# Patient Record
Sex: FEMALE | Race: WHITE | Employment: FULL TIME | URBAN - METROPOLITAN AREA
[De-identification: names, ages, dates, MRNs, and addresses within clinical notes are randomized per-mention and may not be internally consistent; named-entity substitution may affect disease eponyms.]

---

## 2017-03-28 ENCOUNTER — TELEPHONE (OUTPATIENT)
Dept: FAMILY MEDICINE | Facility: CLINIC | Age: 22
End: 2017-03-28

## 2017-03-28 DIAGNOSIS — N94.6 DYSMENORRHEA: ICD-10-CM

## 2017-03-28 RX ORDER — ACETAMINOPHEN AND CODEINE PHOSPHATE 120; 12 MG/5ML; MG/5ML
1 SOLUTION ORAL DAILY
Qty: 90 TABLET | Refills: 0 | Status: SHIPPED | OUTPATIENT
Start: 2017-03-28 | End: 2017-03-30

## 2017-03-28 NOTE — TELEPHONE ENCOUNTER
Patient requesting refill on BCP.  Patient due for appointment.  Patient scheduled with PCP on 3-30-17.  Refilled.

## 2017-03-30 ENCOUNTER — OFFICE VISIT (OUTPATIENT)
Dept: FAMILY MEDICINE | Facility: CLINIC | Age: 22
End: 2017-03-30
Payer: COMMERCIAL

## 2017-03-30 VITALS
TEMPERATURE: 99.4 F | WEIGHT: 237.6 LBS | HEART RATE: 113 BPM | BODY MASS INDEX: 39.58 KG/M2 | OXYGEN SATURATION: 99 % | DIASTOLIC BLOOD PRESSURE: 78 MMHG | SYSTOLIC BLOOD PRESSURE: 111 MMHG | HEIGHT: 65 IN

## 2017-03-30 DIAGNOSIS — E03.4 HYPOTHYROIDISM DUE TO ACQUIRED ATROPHY OF THYROID: ICD-10-CM

## 2017-03-30 DIAGNOSIS — Z00.01 ENCOUNTER FOR ROUTINE ADULT HEALTH EXAMINATION WITH ABNORMAL FINDINGS: Primary | ICD-10-CM

## 2017-03-30 DIAGNOSIS — N94.6 DYSMENORRHEA: ICD-10-CM

## 2017-03-30 DIAGNOSIS — Z11.3 SCREEN FOR STD (SEXUALLY TRANSMITTED DISEASE): ICD-10-CM

## 2017-03-30 LAB — TSH SERPL DL<=0.005 MIU/L-ACNC: 2.31 MU/L (ref 0.4–4)

## 2017-03-30 PROCEDURE — 99395 PREV VISIT EST AGE 18-39: CPT | Performed by: PHYSICIAN ASSISTANT

## 2017-03-30 PROCEDURE — 87591 N.GONORRHOEAE DNA AMP PROB: CPT | Performed by: PHYSICIAN ASSISTANT

## 2017-03-30 PROCEDURE — 87491 CHLMYD TRACH DNA AMP PROBE: CPT | Performed by: PHYSICIAN ASSISTANT

## 2017-03-30 PROCEDURE — 36415 COLL VENOUS BLD VENIPUNCTURE: CPT | Performed by: PHYSICIAN ASSISTANT

## 2017-03-30 PROCEDURE — 84443 ASSAY THYROID STIM HORMONE: CPT | Performed by: PHYSICIAN ASSISTANT

## 2017-03-30 RX ORDER — LEVOTHYROXINE SODIUM 25 UG/1
25 TABLET ORAL DAILY
Qty: 90 TABLET | Refills: 3 | Status: SHIPPED | OUTPATIENT
Start: 2017-03-30 | End: 2018-07-17

## 2017-03-30 RX ORDER — ACETAMINOPHEN AND CODEINE PHOSPHATE 120; 12 MG/5ML; MG/5ML
1 SOLUTION ORAL DAILY
Qty: 90 TABLET | Refills: 3 | Status: SHIPPED | OUTPATIENT
Start: 2017-03-30 | End: 2019-09-03

## 2017-03-30 NOTE — MR AVS SNAPSHOT
After Visit Summary   3/30/2017    Cathy Galloway    MRN: [id number]           Patient Information     Date Of Birth          1995        Visit Information        Provider Department      3/30/2017 12:20 PM Blanche Rojas PA-C [facility]        Today's Diagnoses     Encounter for routine adult health examination with abnormal findings    -  1    Screen for STD (sexually transmitted disease)        Dysmenorrhea        Hypothyroidism due to acquired atrophy of thyroid          Care Instructions      Preventive Health Recommendations  Female Ages 18 to 25     Yearly exam:     See your health care provider every year in order to  o Review health changes.   o Discuss preventive care.    o Review your medicines if your doctor has prescribed any.      You should be tested each year for STDs (sexually transmitted diseases).       After age 20, talk to your provider about how often you should have cholesterol testing.      Starting at age 21, get a Pap test every three years. If you have an abnormal result, your doctor may have you test more often.      If you are at risk for diabetes, you should have a diabetes test (fasting glucose).     Shots:     Get a flu shot each year.     Get a tetanus shot every 10 years.     Consider getting the shot (vaccine) that prevents cervical cancer (Gardasil).    Nutrition:     Eat at least 5 servings of fruits and vegetables each day.    Eat whole-grain bread, whole-wheat pasta and brown rice instead of white grains and rice.    Talk to your provider about Calcium and Vitamin D.     Lifestyle    Exercise at least 150 minutes a week each week (30 minutes a day, 5 days a week). This will help you control your weight and prevent disease.    Limit alcohol to one drink per day.    No smoking.     Wear sunscreen to prevent skin cancer.    See your dentist every six months for an exam and cleaning.        Follow-ups after your visit        Who to  "contact     Normal or non-critical lab and imaging results will be communicated to you by MyChart, letter or phone within 4 business days after the clinic has received the results. If you do not hear from us within 7 days, please contact the clinic through [employer] or phone. If you have a critical or abnormal lab result, we will notify you by phone as soon as possible.  Submit refill requests through [employer] or call your pharmacy and they will forward the refill request to us. Please allow 3 business days for your refill to be completed.          If you need to speak with a  for additional information , please call: [phone]             Additional Information About Your Visit        [employer] Information     [employer] lets you send messages to your doctor, view your test results, renew your prescriptions, schedule appointments and more. To sign up, go to [email or web address] . Click on \"Log in\" on the left side of the screen, which will take you to the Welcome page. Then click on \"Sign up Now\" on the right side of the page.     You will be asked to enter the access code listed below, as well as some personal information. Please follow the directions to create your username and password.     Your access code is: [email or web address]  Expires: 2017 12:39 PM     Your access code will  in 90 days. If you need help or a new code, please call your [facility] clinic or [phone].        Care EveryWhere ID     This is your Care EveryWhere ID. This could be used by other organizations to access your [facility] medical records  [id number]        Your Vitals Were     Pulse Temperature Height Last Period Pulse Oximetry BMI (Body Mass Index)    113 99.4  F (37.4  C) (Oral) 5' 5\" (1.651 m) 2017 99% 39.54 kg/m2       Blood Pressure from Last 3 Encounters:   17 111/78   10/20/16 125/75   16 105/68    Weight from Last 3 Encounters:   17 237 lb 9.6 oz (107.8 kg) (>99 %)*   10/20/16 245 " lb 8 oz (111.4 kg) (>99 %)*   08/09/16 249 lb 12.8 oz (113.3 kg) (>99 %)*     * Growth percentiles are based on CDC 2-20 Years data.              We Performed the Following     Chlamydia trachomatis PCR     Neisseria gonorrhoeae PCR     TSH with free T4 reflex          Where to get your medicines      These medications were sent to [facility] - [address] [facility] [place]  [address] [facility] [place] [address]     Phone:  [phone]     levothyroxine 25 MCG tablet    norethindrone 0.35 MG per tablet          Primary Care Provider Office Phone # Fax #    Blanche Rojas PA-C [phone]       [facility] [address]  [place] [address]        Thank you!     Thank you for choosing [facility]  for your care. Our goal is always to provide you with excellent care. Hearing back from our patients is one way we can continue to improve our services. Please take a few minutes to complete the written survey that you may receive in the mail after your visit with us. Thank you!             Your Updated Medication List - Protect others around you: Learn how to safely use, store and throw away your medicines at www.disposemymeds.org.          This list is accurate as of: 3/30/17 12:39 PM.  Always use your most recent med list.                   Brand Name Dispense Instructions for use    ibuprofen 600 MG tablet    ADVIL/MOTRIN    120 tablet    Take 1 tablet by mouth every 6 hours as needed for pain.       IRON SUPPLEMENT PO      Reported on 3/30/2017       levothyroxine 25 MCG tablet    SYNTHROID/LEVOTHROID    90 tablet    Take 1 tablet (25 mcg) by mouth daily       norethindrone 0.35 MG per tablet    MICRONOR    90 tablet    Take 1 tablet (0.35 mg) by mouth daily

## 2017-03-30 NOTE — NURSING NOTE
"Chief Complaint   Patient presents with     Physical       Initial /78  Pulse 113  Temp 99.4  F (37.4  C) (Oral)  Ht 5' 5\" (1.651 m)  Wt 237 lb 9.6 oz (107.8 kg)  LMP 03/23/2017  SpO2 99%  BMI 39.54 kg/m2 Estimated body mass index is 39.54 kg/(m^2) as calculated from the following:    Height as of this encounter: 5' 5\" (1.651 m).    Weight as of this encounter: 237 lb 9.6 oz (107.8 kg).  Medication Reconciliation: complete   Maryanne Sandoval MA      "

## 2017-03-30 NOTE — PROGRESS NOTES
SUBJECTIVE:     CC: Cathy Galloway is an 21 year old woman who presents for preventive health visit.     Healthy Habits:    Do you get at least three servings of calcium containing foods daily (dairy, green leafy vegetables, etc.)? yes    Amount of exercise or daily activities, outside of work: None     Problems taking medications regularly No    Medication side effects: No    Have you had an eye exam in the past two years? yes    Do you see a dentist twice per year? yes    Do you have sleep apnea, excessive snoring or daytime drowsiness?no        Other concerns:  None    Patient informed that anything we discuss that is not related to preventative medicine, may be billed for; patient verbalizes understanding.    Today's PHQ-2 Score: 0    Abuse: Current or Past(Physical, Sexual or Emotional)- No  Do you feel safe in your environment - Yes    Social History   Substance Use Topics     Smoking status: Never Smoker     Smokeless tobacco: Never Used      Comment: Lives in smoke free household     Alcohol use No     The patient does not drink >3 drinks per day nor >7 drinks per week.    Recent Labs   Lab Test  07/26/11   0948   CHOL  148   HDL  48*   LDL  77   TRIG  110   CHOLHDLRATIO  3.1       Reviewed orders with patient.  Reviewed health maintenance and updated orders accordingly - Yes    Mammo Decision Support:  Mammogram not appropriate for this patient based on age.    Pertinent mammograms are reviewed under the imaging tab.  History of abnormal Pap smear: NO - under age 21, PAP not appropriate for age    Reviewed and updated as needed this visit by clinical staff  Tobacco  Allergies  Meds         Reviewed and updated as needed this visit by Provider        Past Medical History:   Diagnosis Date     Aortic valve disorder      Myopia      Obesity         ROS:  Other than what is noted in the HPI and PMH a complete review of systems is otherwise negative including: Constitutional, HEENT, endocrine,  "cardiovascular, respiratory, GI/, musculoskeletal, neuro, and psychiatric.     Problem list, Medication list, Allergies, and Medical/Social/Surgical histories reviewed in [facility] and updated as appropriate.  Labs reviewed in EPIC  OBJECTIVE:     /78  Pulse 113  Temp 99.4  F (37.4  C) (Oral)  Ht 5' 5\" (1.651 m)  Wt 237 lb 9.6 oz (107.8 kg)  LMP 03/23/2017  SpO2 99%  BMI 39.54 kg/m2  EXAM:  GENERAL: healthy, alert and no distress  EYES: Eyes grossly normal to inspection, PERRL and conjunctivae and sclerae normal  HENT: ear canals and TM's normal, nose and mouth without ulcers or lesions  NECK: no adenopathy, no asymmetry, masses, or scars and thyroid normal to palpation  RESP: lungs clear to auscultation - no rales, rhonchi or wheezes  CV: regular rate and rhythm, normal S1 S2, no S3 or S4, no murmur, click or rub, no peripheral edema and peripheral pulses strong  ABDOMEN: soft, nontender, no hepatosplenomegaly, no masses and bowel sounds normal  MS: no gross musculoskeletal defects noted, no edema  SKIN: no suspicious lesions or rashes  NEURO: Normal strength and tone, mentation intact and speech normal  PSYCH: mentation appears normal, affect normal/bright    ASSESSMENT/PLAN:         ICD-10-CM    1. Encounter for routine adult health examination with abnormal findings Z00.01    2. Screen for STD (sexually transmitted disease) Z11.3 Neisseria gonorrhoeae PCR     Chlamydia trachomatis PCR   3. Dysmenorrhea N94.6 norethindrone (MICRONOR) 0.35 MG per tablet   4. Hypothyroidism due to acquired atrophy of thyroid E03.4 levothyroxine (SYNTHROID/LEVOTHROID) 25 MCG tablet     TSH with free T4 reflex       Labs/screening today.   Meds renewed - no changes.   Follow up annually.      COUNSELING:   Reviewed preventive health counseling, as reflected in patient instructions       reports that she has never smoked. She has never used smokeless tobacco.    Estimated body mass index is 39.54 kg/(m^2) as calculated from " "the following:    Height as of this encounter: 5' 5\" (1.651 m).    Weight as of this encounter: 237 lb 9.6 oz (107.8 kg).       Counseling Resources:  ATP IV Guidelines  Pooled Cohorts Equation Calculator  Breast Cancer Risk Calculator  FRAX Risk Assessment  ICSI Preventive Guidelines  Dietary Guidelines for Americans, 2010  USDA's MyPlate  ASA Prophylaxis  Lung CA Screening    Blanche Rojas PA-C  [facility]  "

## 2017-03-30 NOTE — LETTER
[facility]  [address]  [place] [address]  [phone]        March 31, 2017      Cathy Galloway  [address] [address] [address]        Dear Cathy,        Your STD screen is negative and your thyroid level is in goal range.     Results for orders placed or performed in visit on 03/30/17   TSH with free T4 reflex   Result Value Ref Range    TSH 2.31 0.40 - 4.00 mU/L   Neisseria gonorrhoeae PCR   Result Value Ref Range    Specimen Descrip       Urine  CORRECTED ON 03/31 AT 0655: PREVIOUSLY REPORTED AS Urethral      N Gonorrhea PCR  NEG     Negative   Negative for N. gonorrhoeae rRNA by transcription mediated amplification.   A negative result by transcription mediated amplification does not preclude the   presence of N. gonorrhoeae infection because results are dependent on proper   and adequate collection, absence of inhibitors, and sufficient rRNA to be   detected.     Chlamydia trachomatis PCR   Result Value Ref Range    Specimen Description       Urine  CORRECTED ON 03/31 AT 0655: PREVIOUSLY REPORTED AS Urethral      Chlamydia Trachomatis PCR  NEG     Negative   Negative for C. trachomatis rRNA by transcription mediated amplification.   A negative result by transcription mediated amplification does not preclude the   presence of C. trachomatis infection because results are dependent on proper   and adequate collection, absence of inhibitors, and sufficient rRNA to be   detected.       If you have any questions or concerns, please call myself or my nurse at [phone].    Sincerely,      Blanche Rojas PA-C/rajesh

## 2017-03-31 LAB
C TRACH DNA SPEC QL NAA+PROBE: NORMAL
N GONORRHOEA DNA SPEC QL NAA+PROBE: NORMAL
SPECIMEN SOURCE: NORMAL
SPECIMEN SOURCE: NORMAL

## 2017-03-31 NOTE — PROGRESS NOTES
Please send the following letter to the patient:    Cathy,    Your STD screen is negative and your thyroid level is in goal range.     Please call me with any questions or concerns.          Blanche Rojas PA-C

## 2017-05-24 DIAGNOSIS — Z29.89 NEED FOR SBE (SUBACUTE BACTERIAL ENDOCARDITIS) PROPHYLAXIS: ICD-10-CM

## 2017-05-24 RX ORDER — AMOXICILLIN 500 MG/1
2000 CAPSULE ORAL ONCE
Qty: 4 CAPSULE | Refills: 0 | Status: CANCELLED | OUTPATIENT
Start: 2017-05-24 | End: 2017-05-24

## 2017-06-08 ENCOUNTER — OFFICE VISIT (OUTPATIENT)
Dept: FAMILY MEDICINE | Facility: CLINIC | Age: 22
End: 2017-06-08
Payer: COMMERCIAL

## 2017-06-08 VITALS
SYSTOLIC BLOOD PRESSURE: 130 MMHG | HEART RATE: 106 BPM | WEIGHT: 235 LBS | BODY MASS INDEX: 39.11 KG/M2 | DIASTOLIC BLOOD PRESSURE: 81 MMHG

## 2017-06-08 DIAGNOSIS — M26.609 TMJ (TEMPOROMANDIBULAR JOINT SYNDROME): Primary | ICD-10-CM

## 2017-06-08 PROCEDURE — 99213 OFFICE O/P EST LOW 20 MIN: CPT | Performed by: PHYSICIAN ASSISTANT

## 2017-06-08 RX ORDER — PREDNISONE 10 MG/1
TABLET ORAL
Qty: 42 TABLET | Refills: 0 | Status: SHIPPED | OUTPATIENT
Start: 2017-06-08 | End: 2017-08-10

## 2017-06-08 RX ORDER — DIAZEPAM 5 MG
5 TABLET ORAL EVERY 12 HOURS PRN
Qty: 20 TABLET | Refills: 0 | Status: SHIPPED | OUTPATIENT
Start: 2017-06-08 | End: 2017-08-10

## 2017-06-08 NOTE — PATIENT INSTRUCTIONS
Dental Treatment for Temporomandibular Disorders (TMD)  You have been diagnosed with temporomandibular disorder (TMD).This term describes a group of problems related to the temporomandibular joint (TMJ) and nearby muscles. The TMJ is located where the upper and lower jaws meet and is part of a structural system that includes the teeth. Because the joint and teeth work together, a problem with your teeth and bite can be linked to TMD. If you grind your teeth or if you have a bad bite, your dentist may be able to help. If your teeth need to be realigned to improve your bite, you may be referred to an orthodontist.  If you grind or clench your teeth    Bruxism (teeth grinding) or clenching strains the TMJ and related muscles. If you have these habits during the day, doing self-checks can help you stop. But it s hard to control these habits when you re asleep. That s when the use of a splint can often help:    How a splint works. A splint is an appliance that fits in the mouth. It may also be called an orthotic or . There are different kinds of splints for different kinds of needs. A splint can keep the upper and lower teeth apart. This helps protect tooth surfaces from grinding. A splint can also be made to reduce strain on the area.    Wearing and caring for your splint. To make a splint, your dentist or orthodontist may take impressions of your teeth. Then a splint will be made to fit your mouth. A splint:    May be worn during the day or only at night. Be sure to ask when and how often you should wear your splint.    Should be cleaned before you put it in and after you take it out. Ask your dentist or orthodontist how to clean the splint.    Should be kept in a protective case, away from the reach of children and pets. This helps keep the splint from getting dirty or broken.  If your bite is incorrect  Malocclusion means the jaws or teeth don t fit together properly. This can result in pain and problems  with jaw function. If your jaws or teeth are out of alignment, orthodontic treatment may help. If your bad bite is due to missing or damaged teeth, you may receive restorative treatment.    Restorative treatment. A bad bite can be caused by missing or damaged teeth. A dentist can restore teeth in many ways:    A crown is made of ceramic, metal, or porcelain and metal. It is cemented in place to repair a broken or damaged tooth.    A bridge is a false tooth fused between 2 crowns.    A dental implant is an artificial tooth root. It is attached to the jawbone as a base for an artificial tooth.    Orthodontic treatment. Sometimes the upper and lower jaws are out of alignment. Or teeth are out of line, turned, crowded, or spaced too far apart. Your orthodontist can align teeth with braces and other devices. This helps provide a more comfortable bite.  If surgery is needed  Surgery is rarely needed for TMD. However, if other treatments haven t worked, you may be referred to an oral and maxillofacial surgeon. Talk to your dentist about whether surgery might be right for you.     7386-0597 [employer]. [address] [place] [address]. All rights reserved. This information is not intended as a substitute for professional medical care. Always follow your healthcare professional's instructions.        Pain Relief Methods for Temporomandibular Disorders (TMD)  You have been diagnosed with temporomandibular disorder (TMD). This term describes a group of problems related to the temporomandibular joint (TMJ)and nearby muscles. The TMJ is located where the upper and lower jaws meet. TMD can cause painful and frustrating symptoms. But your health care provider can recommend various pain relief methods as part of your treatment. These may include medicines and certain types of therapy, like massage or gentle exercise.  Using medicines    Medicines may be prescribed to treat TMD. Others may be available  over-the-counter. The medicine type and dosage will depend on the problem you have. For your safety, tell your health care provider if you are currently taking any medicines. Also mention any herbs or supplements you are using. Common medicines used to treat TMD include:    Anti-inflammatories and analgesics. These treat pain, inflammation, osteoarthritis, and rheumatoid arthritis. Anti-inflammatories reduce swelling, heat, redness, and pain. They also help restore function. Analgesics reduce pain. Nonsteroidal anti-inflammatories (NSAIDs) relieve inflammation as well as pain.    Muscle relaxants. These treat myofascial pain. This is pain that occurs in the soft tissues or muscles around the TMJ. Muscle relaxants help ease muscle tension. This reduces pressure on the TMJ from tight jaw muscles.    Antidepressants. These can be used to reduce pain or bruxism (teeth grinding). At higher dosages, these medicines are used to treat depression. Given at low dosages, antidepressants help relieve TMD symptoms. They can reduce muscle pain. They also raise the level of serotonin, a body chemical that improves sleep. This in turn can decrease bruxism during the night.  Treating painful muscles  A trigger point is a painful spot in a tight muscle. It is often painful to the touch and may refer pain to other places. Your health care provider can focus on trigger points using:    Massage, both inside and outside the mouth. This relaxes muscles and improves circulation.    Palpation, which is applying pressure to points of the jaw and face with the fingers.    Cold spray and stretching of the muscles to relax them.    An anesthetic for pain relief. This may be given as an injection by your dentist.  Treating the joint  Therapy may focus directly on the TMJ. There are different ways to treat the joint:    A self-care program helps you treat and manage symptoms on your own. Your program may include exercises. It may also include  using ice and heat to relieve pain.    Gentle manipulation reduces pain and restores range of motion. The health care provider uses his or her hands to relax muscles and ligaments around the joint.    Exercises strengthen muscles in the jaw and face.    Ultrasound uses sound waves to reduce pain and swelling. It also improves pain and swelling.  Treating inflammation  When the joint is inflamed, movement becomes difficult -- even impossible at times. Your health care provider can help. Treatment may include:    Rest and gentle exercise to increase range of motion. One common exercise is to apply pressure to the jaw and resist the movement (isometric exercise).    A gel pack or ice wrapped in a towel applied for 10 to 20 minutes repeated 3 or 4 times a day. This eases swelling and reduces pain.    Massage and gentle manipulation as described above.    2464-3290 [employer]. [address] [place] [address], [place] [address]. All rights reserved. This information is not intended as a substitute for professional medical care. Always follow your healthcare professional's instructions.        Self-Care for Temporomandibular Disorders (TMD)  You have temporomandibular disorder (TMD). This term describes a group of problems related to the temporomandibular joint (TMJ) and nearby muscles. The TMJ is located where the upper and lower jaws meet. Treatment will get your jaw back to normal function. But your care doesn t end there. Once you ve had TMD, it s important to avoid reinjury. Get in the habit of doing self-checks. This can make you aware of any symptoms that begin to return, so you can take action right away.    Doing self-checks  Make it a habit to assess your body a few times each day. Try writing yourself a reminder. Or set an alarm on your watch or computer. When doing a self-check, ask yourself:    Do I feel stressed?    Are my muscles tense?    Am I grinding or clenching my teeth?    Is my posture healthy  for my body?    Is there anything I can do to make myself more comfortable?  If you answer  yes  to any of the questions above, you need to take action. Adjusting your posture or taking a short break can help prevent or relieve TMD symptoms.  Listening to your body  Many people get used to ignoring pain. But pain is a signal that your body needs care. To maintain your TMJ health:    Avoid hard or chewy foods. Even if you feel fine, eating such foods can trigger symptoms again.    Be aware of your body. Don t ignore TMD symptoms. The nagging pain in your neck or jaw may be a sign that you need care.    Be sure to keep follow-up appointments with your health care team.  Managing stress  Stress is a key factor in TMD. Stress can cause you to clench your muscles or grind your teeth. It can also affect your sleep, reducing your body s ability to heal. Here are a few tips to manage stress:    Learn ways to relax. Try listening to music or gently stretching. Take a few slow deep breaths. Or, close your eyes and imagine a place or object that is calming.    Get plenty of rest and sleep.    Set goals you know you can attain.    Make time for people and things you enjoy.    Ask for help if you need it. Friends and family can run errands and cook meals for you.   Staying active  Activity helps the body in many ways. You stay looser and more relaxed. It also helps keep muscles and tissues conditioned. That way you can heal faster and make reinjury less likely. Here are some tips to get you started:    Talk to your health care provider before starting an exercise program.    Always warm up and stretch before each activity. This helps prevent injury.    Try walking or swimming. These activities are easy on your joints. They also benefit your heart and lungs.    Try yoga or adrian chi. These are relaxing activities known for reducing stress.    3237-2362 [employer]. [address] [place] [address], [place] [address]. All rights  reserved. This information is not intended as a substitute for professional medical care. Always follow your healthcare professional's instructions.        Understanding Temporomandibular Disorders (TMD)  Do you have pain in your face, jaw, or teeth? Do you have trouble chewing? Does your jaw make clicking or popping noises? These symptoms can be caused by temporomandibular disorders (TMD). This term describes a group of problems related to the temporomandibular joint (TMJ) and nearby muscles. Your symptoms may be painful and frustrating. But don t worry. Your health care team can help you treat TMD and prevent future problems.  What s wrong?    TMD causes many kinds of symptoms. That s part of the reason it can be hard to diagnose. You may have headaches, tooth pain, or muscle aches. Your pain may be constant. Or it may come and go without any apparent reason. TMD-related problems include:    Tight muscles    Joint inflammation    Joint damage    Teeth grinding or clenching  What can you do?  If you are having TMD symptoms, don t wait. Call your dentist or health care provider right away. You don t have to live with pain or discomfort. TMD can be treated. In fact, a key part of treatment is learning to manage your condition at home.  Which treatment is right for you?  Treatment helps rest the muscles and joint. It also helps relieve symptoms and restore function. Depending on the type of problem you have, your treatment plan may include:    Temporary diet changes.    New habits for managing stress and maintaining the health of your jaw.    Medicine to reduce pain and inflammation.    Therapy to reduce pressure on the joint and restore function.    Dental treatment to reduce pressure on the joint.  How can you avoid future problems?  Treatment can help relieve your current condition. But TMD symptoms may return over time. You can avoid future problems by maintaining the health of your jaw:    Avoid foods and habits  that make your symptoms worse.    Lower the stress level in your life.    Follow your treatment plan.    Pay attention to your body and get help if symptoms return.    2857-1308 [employer]. [address] [place] [address], [place] [address]. All rights reserved. This information is not intended as a substitute for professional medical care. Always follow your healthcare professional's instructions.        When You Have Temporomandibular Disorder (TMD)  The temporomandibular joint (TMJ) is a ball-and-socket joint located where the upper and lower jaws meet. The TMJ and its nearby muscles make up a complex, loosely connected system. Because of this, a problem in one part of the system can affect the other parts. This can cause you to have temporomandibular disorder (TMD).         How the temporomandibular joint works  You have 1 joint on each side of your mouth that together make up the TMJ. These joints are part of a large group of muscles, ligaments, and bones that work together as a system. When the system is healthy, you can talk, chew, and even yawn in comfort. Muscles contract and relax to open and close the joint. The disk is made of cartilage and is located between the condyle and the skull. It absorbs pressure in the joint and allows the jaws to open and close smoothly. Fluid (called synovial fluid) lubricates the joints. Ligaments connect the lower jaw bones to the skull. They also support the joint.  Common temporomandibular problems  When there is a problem with the TMJ and its related system, you can develop TMD. Common TMD problems include tight muscles, inflamed joints, and damaged joints.  In some cases, symptoms may be related to the teeth or bite.    Tight muscles. The muscles surrounding the TMJ can go into spasm (tighten) and cause pain. Referred pain happens in a part of the body separate from the source of the problem. For example, pain in the face or teeth could be coming from a problem in  the TMJ. Myofascial pain happens in soft tissues, like muscle. Trigger points in these pain areas often cause referred pain. You may feel jaw, neck, or shoulder pain.    Inflamed joints. Inflammation may include pain, redness, heat, swelling, or loss of function. Synovitis happens when certain tissues surrounding the TMJ become inflamed. It causes pain that increases with jaw movement. Inflamed ligamentscan be caused by strain or injury. When this happens, the ligaments are unable to support the joint. Rheumatoid arthritis is a joint disease. It leads to inflammation in the TMJ.    Damaged joints. Many people hear clicking when their jaw moves. If you feel pain along with the noise, the joint may be damaged. Impingement happens when the disk slips out of place (displacement). This causes the jaw to catch. As the disk slips, you may hear a clicking sound. Locked jaw happens when the disk gets stuck in one position. As a result, the jaw locks open or closed. Osteoarthritis is a joint disease. It causes the TMJ to wear away (degenerate). This leads to pain during movement.     Other problems  The parts of the jaw and mouth make up a single unit. That s why a problem in 1 area can cause symptoms elsewhere. Teeth or bite problems associated with TMD include:    Bruxism (grinding your teeth side to side)    Clenching (biting down on your teeth)    Malocclusion (when the teeth or bite is out of alignment)  Your health care provider will give you more information about these problems if needed.     3255-5349 [employer]. [address] [place] [address]. All rights reserved. This information is not intended as a substitute for professional medical care. Always follow your healthcare professional's instructions.        TMJ Syndrome  The temporomandibular joint (TMJ) is the joint that connects your lower jaw to your head. You can feel it in front of your ears when you open and close your mouth. TMJ  disorders involve chronic or recurrent pain in the joint. When treated, symptoms of TMJ disorders usually go away within a few months.  Causes  There is no widely agreed-on cause of TMJ disorders. They have been linked to injury, arthritis, chronic fatigue syndrome, and fibromyalgia. A definite connection has not been shown, though.  Symptoms    Pain in the face, jaw, or neck    Pain with jaw movement or chewing    Locking or catching sensation of the jaw    Clicking, popping, or grinding sounds with movement of the TMJ    Headache    Ear pain  Home care  Modest, nonsurgical treatments are a good first step toward relieving symptoms. Try the approaches described below.    Rest the jaw by avoiding crunchy or hard-to-chew foods. Do not eat hard or sticky candies. Soft foods and liquids are easier on the jaw.    Protect your jaw while yawning. If you need to yawn, put your fist under your chin to prevent your mouth from opening up too wide.    To help relieve pain, try applying hot or cold packs to the painful area. Try both hot and cold to find out which works best for you. If you use hot packs (small towels soaked in hot water), be careful not to burn yourself.    You may take acetaminophen or ibuprofen for pain, unless you were given a different pain medicine. (Note: If you have chronic liver or kidney disease or have ever had a stomach ulcer or gastrointestinal bleeding, talk with your healthcare provider before using these medicines. Also talk to your provider if you are taking medicine to prevent blood clots.) Aspirin should never be given to anyone younger than 18 years of age who is ill with a viral infection or fever. It may cause severe liver or brain damage.  Reducing stress  If stress seems to be contributing to your symptoms, try to identify the sources of stress in your life. These aren t always obvious. Common stressors include:    Everyday hassles (which can add up), such as traffic jams, missed  appointments, or car trouble    Major life changes, both good (such as a new baby or job promotion) and bad (loss of job or loss of a loved one)    Overload: The feeling that you have too many responsibilities and can't take care of everything at once    Helplessness: Feeling like your problems are more than you can solve  When possible, do something about your sources of stress. See if you can avoid hassles, limit the amount of change in your life at one time, and take breaks when you feel overloaded.  Unfortunately, many stressful situations cannot be avoided. So learning how to manage stress better is very important. Getting regular exercise, eating nutritious, balanced meals, and getting adequate rest all help to make everyday stress more manageable. Certain techniques are also helpful: relaxation and breathing exercises, visualization, biofeedback, meditation, or simply taking some time out to clear your mind. For more information, talk with your healthcare provider.  Follow-up care  Follow up with your healthcare provider, or as advised. Further testing and additional treatment may be required. If changes to your lifestyle do not improve your symptoms, talk with your healthcare provider about other available therapies. These include bite guards for help with teeth grinding, stress management techniques, and more. If stress is an important factor and does not respond to the above simple measures, talk to your doctor about a referral for stress management.    If X-rays were done, they will be reviewed by a specialist. You will be notified of the results, especially if they affect treatment.  Call 911  Call emergency services right away if any of these occur:    Trouble breathing or swallowing, wheezing    Confusion    Extreme drowsiness or trouble awakening    Fainting or loss of consciousness    Rapid heart rate  When to seek medical advice  Call your healthcare provider right away if any of these  occur:    Your face becomes swollen or red.    Your pain worsens.    You have increasing neck, mouth, tooth, or throat pain.    You develop a fever of 100.4F (38 C) or higher    3924-2732 [employer]. [address] [place] [address], [place] [address]. All rights reserved. This information is not intended as a substitute for professional medical care. Always follow your healthcare professional's instructions.

## 2017-06-08 NOTE — NURSING NOTE
"Chief Complaint   Patient presents with     Temporomandibular Joint Pain       Initial /81  Pulse 106  Wt 235 lb (106.6 kg)  BMI 39.11 kg/m2 Estimated body mass index is 39.11 kg/(m^2) as calculated from the following:    Height as of 3/30/17: 5' 5\" (1.651 m).    Weight as of this encounter: 235 lb (106.6 kg).  Medication Reconciliation: dayna Ramirez CMA      "

## 2017-06-08 NOTE — PROGRESS NOTES
SUBJECTIVE:                                                    Cathy Galloway is a 21 year old female who presents to clinic today for the following health issues:      Patient is here today discuss treatment for TMJ.       Bothers her most in the am. Some stiffness. Occasional it locks closed.     Problem list and histories reviewed & adjusted, as indicated.  Additional history: as documented    Patient Active Problem List   Diagnosis     Myopia     Obesity     Dysmenorrhea     Hypothyroidism due to acquired atrophy of thyroid     Supravalvar aortic stenosis     Past Surgical History:   Procedure Laterality Date     HEART CATH CHILD  8/20/2012    Procedure: HEART CATH CHILD;  Right and Left Heart Cath;  Surgeon: Esvin Merols MD;  Location: [facility]     RELEASE TRIGGER FINGER       REPAIR VALVE AORTIC  10/8/2012    Procedure: REPAIR VALVE AORTIC;  Release Supravalvular Aortic Stenosis, medan sternotomy, trans-esophageal echocardiaogram done by Dr. Wills;  Surgeon: Seng Lan MD;  Location: [facility]       Social History   Substance Use Topics     Smoking status: Never Smoker     Smokeless tobacco: Never Used      Comment: Lives in smoke free household     Alcohol use No     Family History   Problem Relation Age of Onset     Hypertension Father      Cardiovascular Maternal Grandmother      HEART DISEASE Maternal Grandmother      Cardiovascular Paternal Grandmother      Lipids Paternal Grandmother      HEART DISEASE Paternal Grandmother      Cardiovascular Brother      HEART DISEASE Brother      Cardiovascular Maternal Aunt      HEART DISEASE Maternal Aunt      Musculoskeletal Disorder Mother      back     CANCER No family hx of      DIABETES No family hx of      CEREBROVASCULAR DISEASE No family hx of      Thyroid Disease No family hx of      Glaucoma No family hx of      Macular Degeneration No family hx of          BP Readings from Last 3 Encounters:   06/08/17 130/81   03/30/17 111/78    10/20/16 125/75    Wt Readings from Last 3 Encounters:   06/08/17 235 lb (106.6 kg) (>99 %)*   03/30/17 237 lb 9.6 oz (107.8 kg) (>99 %)*   10/20/16 245 lb 8 oz (111.4 kg) (>99 %)*     * Growth percentiles are based on [facility] 2-20 Years data.                    Reviewed and updated as needed this visit by clinical staff  Allergies  Meds       Reviewed and updated as needed this visit by Provider         All other systems negative except as outline above  OBJECTIVE:    ENT exam reveals - ENT exam normal, no neck nodes or sinus tenderness.  Mild crepitus left tmj. Limited rom with opening of jaw. Tenderness with palpation of left tmj. Dentition normal.     Cathy was seen today for temporomandibular joint pain.    Diagnoses and all orders for this visit:    TMJ (temporomandibular joint syndrome)  -     predniSONE (DELTASONE) 10 MG tablet; Take 60 mg days 1 and 2, 50 mg days 3 and 4, 40 mg days 5 and 6, 30 mg days 7 and 8, 20 mg days 9 and 10, 10 mg days 11 and 12  -     diazepam (VALIUM) 5 MG tablet; Take 1 tablet (5 mg) by mouth every 12 hours as needed for anxiety or sleep  -     [facility] PT[facility] HAND, AND CHIROPRACTIC REFERRAL  -     ORAL SURGERY REFERRAL      Patient Instructions       Dental Treatment for Temporomandibular Disorders (TMD)  You have been diagnosed with temporomandibular disorder (TMD).This term describes a group of problems related to the temporomandibular joint (TMJ) and nearby muscles. The TMJ is located where the upper and lower jaws meet and is part of a structural system that includes the teeth. Because the joint and teeth work together, a problem with your teeth and bite can be linked to TMD. If you grind your teeth or if you have a bad bite, your dentist may be able to help. If your teeth need to be realigned to improve your bite, you may be referred to an orthodontist.  If you grind or clench your teeth    Bruxism (teeth grinding) or clenching strains the TMJ and related muscles. If you have  these habits during the day, doing self-checks can help you stop. But it s hard to control these habits when you re asleep. That s when the use of a splint can often help:    How a splint works. A splint is an appliance that fits in the mouth. It may also be called an orthotic or . There are different kinds of splints for different kinds of needs. A splint can keep the upper and lower teeth apart. This helps protect tooth surfaces from grinding. A splint can also be made to reduce strain on the area.    Wearing and caring for your splint. To make a splint, your dentist or orthodontist may take impressions of your teeth. Then a splint will be made to fit your mouth. A splint:    May be worn during the day or only at night. Be sure to ask when and how often you should wear your splint.    Should be cleaned before you put it in and after you take it out. Ask your dentist or orthodontist how to clean the splint.    Should be kept in a protective case, away from the reach of children and pets. This helps keep the splint from getting dirty or broken.  If your bite is incorrect  Malocclusion means the jaws or teeth don t fit together properly. This can result in pain and problems with jaw function. If your jaws or teeth are out of alignment, orthodontic treatment may help. If your bad bite is due to missing or damaged teeth, you may receive restorative treatment.    Restorative treatment. A bad bite can be caused by missing or damaged teeth. A dentist can restore teeth in many ways:    A crown is made of ceramic, metal, or porcelain and metal. It is cemented in place to repair a broken or damaged tooth.    A bridge is a false tooth fused between 2 crowns.    A dental implant is an artificial tooth root. It is attached to the jawbone as a base for an artificial tooth.    Orthodontic treatment. Sometimes the upper and lower jaws are out of alignment. Or teeth are out of line, turned, crowded, or spaced too far  apart. Your orthodontist can align teeth with braces and other devices. This helps provide a more comfortable bite.  If surgery is needed  Surgery is rarely needed for TMD. However, if other treatments haven t worked, you may be referred to an oral and maxillofacial surgeon. Talk to your dentist about whether surgery might be right for you.     9244-9589 [employer]. [address] [place] [address]. All rights reserved. This information is not intended as a substitute for professional medical care. Always follow your healthcare professional's instructions.        Pain Relief Methods for Temporomandibular Disorders (TMD)  You have been diagnosed with temporomandibular disorder (TMD). This term describes a group of problems related to the temporomandibular joint (TMJ)and nearby muscles. The TMJ is located where the upper and lower jaws meet. TMD can cause painful and frustrating symptoms. But your health care provider can recommend various pain relief methods as part of your treatment. These may include medicines and certain types of therapy, like massage or gentle exercise.  Using medicines    Medicines may be prescribed to treat TMD. Others may be available over-the-counter. The medicine type and dosage will depend on the problem you have. For your safety, tell your health care provider if you are currently taking any medicines. Also mention any herbs or supplements you are using. Common medicines used to treat TMD include:    Anti-inflammatories and analgesics. These treat pain, inflammation, osteoarthritis, and rheumatoid arthritis. Anti-inflammatories reduce swelling, heat, redness, and pain. They also help restore function. Analgesics reduce pain. Nonsteroidal anti-inflammatories (NSAIDs) relieve inflammation as well as pain.    Muscle relaxants. These treat myofascial pain. This is pain that occurs in the soft tissues or muscles around the TMJ. Muscle relaxants help ease muscle tension.  This reduces pressure on the TMJ from tight jaw muscles.    Antidepressants. These can be used to reduce pain or bruxism (teeth grinding). At higher dosages, these medicines are used to treat depression. Given at low dosages, antidepressants help relieve TMD symptoms. They can reduce muscle pain. They also raise the level of serotonin, a body chemical that improves sleep. This in turn can decrease bruxism during the night.  Treating painful muscles  A trigger point is a painful spot in a tight muscle. It is often painful to the touch and may refer pain to other places. Your health care provider can focus on trigger points using:    Massage, both inside and outside the mouth. This relaxes muscles and improves circulation.    Palpation, which is applying pressure to points of the jaw and face with the fingers.    Cold spray and stretching of the muscles to relax them.    An anesthetic for pain relief. This may be given as an injection by your dentist.  Treating the joint  Therapy may focus directly on the TMJ. There are different ways to treat the joint:    A self-care program helps you treat and manage symptoms on your own. Your program may include exercises. It may also include using ice and heat to relieve pain.    Gentle manipulation reduces pain and restores range of motion. The health care provider uses his or her hands to relax muscles and ligaments around the joint.    Exercises strengthen muscles in the jaw and face.    Ultrasound uses sound waves to reduce pain and swelling. It also improves pain and swelling.  Treating inflammation  When the joint is inflamed, movement becomes difficult -- even impossible at times. Your health care provider can help. Treatment may include:    Rest and gentle exercise to increase range of motion. One common exercise is to apply pressure to the jaw and resist the movement (isometric exercise).    A gel pack or ice wrapped in a towel applied for 10 to 20 minutes repeated 3 or  4 times a day. This eases swelling and reduces pain.    Massage and gentle manipulation as described above.    1787-9095 [employer]. [address] [place] [address], [place] [address]. All rights reserved. This information is not intended as a substitute for professional medical care. Always follow your healthcare professional's instructions.        Self-Care for Temporomandibular Disorders (TMD)  You have temporomandibular disorder (TMD). This term describes a group of problems related to the temporomandibular joint (TMJ) and nearby muscles. The TMJ is located where the upper and lower jaws meet. Treatment will get your jaw back to normal function. But your care doesn t end there. Once you ve had TMD, it s important to avoid reinjury. Get in the habit of doing self-checks. This can make you aware of any symptoms that begin to return, so you can take action right away.    Doing self-checks  Make it a habit to assess your body a few times each day. Try writing yourself a reminder. Or set an alarm on your watch or computer. When doing a self-check, ask yourself:    Do I feel stressed?    Are my muscles tense?    Am I grinding or clenching my teeth?    Is my posture healthy for my body?    Is there anything I can do to make myself more comfortable?  If you answer  yes  to any of the questions above, you need to take action. Adjusting your posture or taking a short break can help prevent or relieve TMD symptoms.  Listening to your body  Many people get used to ignoring pain. But pain is a signal that your body needs care. To maintain your TMJ health:    Avoid hard or chewy foods. Even if you feel fine, eating such foods can trigger symptoms again.    Be aware of your body. Don t ignore TMD symptoms. The nagging pain in your neck or jaw may be a sign that you need care.    Be sure to keep follow-up appointments with your health care team.  Managing stress  Stress is a key factor in TMD. Stress can cause you to  clench your muscles or grind your teeth. It can also affect your sleep, reducing your body s ability to heal. Here are a few tips to manage stress:    Learn ways to relax. Try listening to music or gently stretching. Take a few slow deep breaths. Or, close your eyes and imagine a place or object that is calming.    Get plenty of rest and sleep.    Set goals you know you can attain.    Make time for people and things you enjoy.    Ask for help if you need it. Friends and family can run errands and cook meals for you.   Staying active  Activity helps the body in many ways. You stay looser and more relaxed. It also helps keep muscles and tissues conditioned. That way you can heal faster and make reinjury less likely. Here are some tips to get you started:    Talk to your health care provider before starting an exercise program.    Always warm up and stretch before each activity. This helps prevent injury.    Try walking or swimming. These activities are easy on your joints. They also benefit your heart and lungs.    Try yoga or adrian chi. These are relaxing activities known for reducing stress.    2890-2220 [employer]. [address] [place], [place] [id number]. All rights reserved. This information is not intended as a substitute for professional medical care. Always follow your healthcare professional's instructions.        Understanding Temporomandibular Disorders (TMD)  Do you have pain in your face, jaw, or teeth? Do you have trouble chewing? Does your jaw make clicking or popping noises? These symptoms can be caused by temporomandibular disorders (TMD). This term describes a group of problems related to the temporomandibular joint (TMJ) and nearby muscles. Your symptoms may be painful and frustrating. But don t worry. Your health care team can help you treat TMD and prevent future problems.  What s wrong?    TMD causes many kinds of symptoms. That s part of the reason it can be hard to diagnose. You  may have headaches, tooth pain, or muscle aches. Your pain may be constant. Or it may come and go without any apparent reason. TMD-related problems include:    Tight muscles    Joint inflammation    Joint damage    Teeth grinding or clenching  What can you do?  If you are having TMD symptoms, don t wait. Call your dentist or health care provider right away. You don t have to live with pain or discomfort. TMD can be treated. In fact, a key part of treatment is learning to manage your condition at home.  Which treatment is right for you?  Treatment helps rest the muscles and joint. It also helps relieve symptoms and restore function. Depending on the type of problem you have, your treatment plan may include:    Temporary diet changes.    New habits for managing stress and maintaining the health of your jaw.    Medicine to reduce pain and inflammation.    Therapy to reduce pressure on the joint and restore function.    Dental treatment to reduce pressure on the joint.  How can you avoid future problems?  Treatment can help relieve your current condition. But TMD symptoms may return over time. You can avoid future problems by maintaining the health of your jaw:    Avoid foods and habits that make your symptoms worse.    Lower the stress level in your life.    Follow your treatment plan.    Pay attention to your body and get help if symptoms return.    2984-6678 [employer]. [address] [place] [address], [place] [id number]. All rights reserved. This information is not intended as a substitute for professional medical care. Always follow your healthcare professional's instructions.        When You Have Temporomandibular Disorder (TMD)  The temporomandibular joint (TMJ) is a ball-and-socket joint located where the upper and lower jaws meet. The TMJ and its nearby muscles make up a complex, loosely connected system. Because of this, a problem in one part of the system can affect the other parts. This can cause you to  have temporomandibular disorder (TMD).         How the temporomandibular joint works  You have 1 joint on each side of your mouth that together make up the TMJ. These joints are part of a large group of muscles, ligaments, and bones that work together as a system. When the system is healthy, you can talk, chew, and even yawn in comfort. Muscles contract and relax to open and close the joint. The disk is made of cartilage and is located between the condyle and the skull. It absorbs pressure in the joint and allows the jaws to open and close smoothly. Fluid (called synovial fluid) lubricates the joints. Ligaments connect the lower jaw bones to the skull. They also support the joint.  Common temporomandibular problems  When there is a problem with the TMJ and its related system, you can develop TMD. Common TMD problems include tight muscles, inflamed joints, and damaged joints.  In some cases, symptoms may be related to the teeth or bite.    Tight muscles. The muscles surrounding the TMJ can go into spasm (tighten) and cause pain. Referred pain happens in a part of the body separate from the source of the problem. For example, pain in the face or teeth could be coming from a problem in the TMJ. Myofascial pain happens in soft tissues, like muscle. Trigger points in these pain areas often cause referred pain. You may feel jaw, neck, or shoulder pain.    Inflamed joints. Inflammation may include pain, redness, heat, swelling, or loss of function. Synovitis happens when certain tissues surrounding the TMJ become inflamed. It causes pain that increases with jaw movement. Inflamed ligamentscan be caused by strain or injury. When this happens, the ligaments are unable to support the joint. Rheumatoid arthritis is a joint disease. It leads to inflammation in the TMJ.    Damaged joints. Many people hear clicking when their jaw moves. If you feel pain along with the noise, the joint may be damaged. Impingement happens when the  disk slips out of place (displacement). This causes the jaw to catch. As the disk slips, you may hear a clicking sound. Locked jaw happens when the disk gets stuck in one position. As a result, the jaw locks open or closed. Osteoarthritis is a joint disease. It causes the TMJ to wear away (degenerate). This leads to pain during movement.     Other problems  The parts of the jaw and mouth make up a single unit. That s why a problem in 1 area can cause symptoms elsewhere. Teeth or bite problems associated with TMD include:    Bruxism (grinding your teeth side to side)    Clenching (biting down on your teeth)    Malocclusion (when the teeth or bite is out of alignment)  Your health care provider will give you more information about these problems if needed.     2453-8759 [employer]. [address] [place] [address]. All rights reserved. This information is not intended as a substitute for professional medical care. Always follow your healthcare professional's instructions.        TMJ Syndrome  The temporomandibular joint (TMJ) is the joint that connects your lower jaw to your head. You can feel it in front of your ears when you open and close your mouth. TMJ disorders involve chronic or recurrent pain in the joint. When treated, symptoms of TMJ disorders usually go away within a few months.  Causes  There is no widely agreed-on cause of TMJ disorders. They have been linked to injury, arthritis, chronic fatigue syndrome, and fibromyalgia. A definite connection has not been shown, though.  Symptoms    Pain in the face, jaw, or neck    Pain with jaw movement or chewing    Locking or catching sensation of the jaw    Clicking, popping, or grinding sounds with movement of the TMJ    Headache    Ear pain  Home care  Modest, nonsurgical treatments are a good first step toward relieving symptoms. Try the approaches described below.    Rest the jaw by avoiding crunchy or hard-to-chew foods. Do not eat  hard or sticky candies. Soft foods and liquids are easier on the jaw.    Protect your jaw while yawning. If you need to yawn, put your fist under your chin to prevent your mouth from opening up too wide.    To help relieve pain, try applying hot or cold packs to the painful area. Try both hot and cold to find out which works best for you. If you use hot packs (small towels soaked in hot water), be careful not to burn yourself.    You may take acetaminophen or ibuprofen for pain, unless you were given a different pain medicine. (Note: If you have chronic liver or kidney disease or have ever had a stomach ulcer or gastrointestinal bleeding, talk with your healthcare provider before using these medicines. Also talk to your provider if you are taking medicine to prevent blood clots.) Aspirin should never be given to anyone younger than 18 years of age who is ill with a viral infection or fever. It may cause severe liver or brain damage.  Reducing stress  If stress seems to be contributing to your symptoms, try to identify the sources of stress in your life. These aren t always obvious. Common stressors include:    Everyday hassles (which can add up), such as traffic jams, missed appointments, or car trouble    Major life changes, both good (such as a new baby or job promotion) and bad (loss of job or loss of a loved one)    Overload: The feeling that you have too many responsibilities and can't take care of everything at once    Helplessness: Feeling like your problems are more than you can solve  When possible, do something about your sources of stress. See if you can avoid hassles, limit the amount of change in your life at one time, and take breaks when you feel overloaded.  Unfortunately, many stressful situations cannot be avoided. So learning how to manage stress better is very important. Getting regular exercise, eating nutritious, balanced meals, and getting adequate rest all help to make everyday stress more  manageable. Certain techniques are also helpful: relaxation and breathing exercises, visualization, biofeedback, meditation, or simply taking some time out to clear your mind. For more information, talk with your healthcare provider.  Follow-up care  Follow up with your healthcare provider, or as advised. Further testing and additional treatment may be required. If changes to your lifestyle do not improve your symptoms, talk with your healthcare provider about other available therapies. These include bite guards for help with teeth grinding, stress management techniques, and more. If stress is an important factor and does not respond to the above simple measures, talk to your doctor about a referral for stress management.    If X-rays were done, they will be reviewed by a specialist. You will be notified of the results, especially if they affect treatment.  Call 911  Call emergency services right away if any of these occur:    Trouble breathing or swallowing, wheezing    Confusion    Extreme drowsiness or trouble awakening    Fainting or loss of consciousness    Rapid heart rate  When to seek medical advice  Call your healthcare provider right away if any of these occur:    Your face becomes swollen or red.    Your pain worsens.    You have increasing neck, mouth, tooth, or throat pain.    You develop a fever of 100.4F (38 C) or higher    [id number] [employer]. [address] [place] [address], [place] [address]. All rights reserved. This information is not intended as a substitute for professional medical care. Always follow your healthcare professional's instructions.

## 2017-06-08 NOTE — MR AVS SNAPSHOT
After Visit Summary   6/8/2017    Cathy Galloway    MRN: [id number]           Patient Information     Date Of Birth          1995        Visit Information        Provider Department      6/8/2017 3:00 PM Vijay Hammonds PA-C [facility]        Today's Diagnoses     TMJ (temporomandibular joint syndrome)    -  1      Care Instructions      Dental Treatment for Temporomandibular Disorders (TMD)  You have been diagnosed with temporomandibular disorder (TMD).This term describes a group of problems related to the temporomandibular joint (TMJ) and nearby muscles. The TMJ is located where the upper and lower jaws meet and is part of a structural system that includes the teeth. Because the joint and teeth work together, a problem with your teeth and bite can be linked to TMD. If you grind your teeth or if you have a bad bite, your dentist may be able to help. If your teeth need to be realigned to improve your bite, you may be referred to an orthodontist.  If you grind or clench your teeth    Bruxism (teeth grinding) or clenching strains the TMJ and related muscles. If you have these habits during the day, doing self-checks can help you stop. But it s hard to control these habits when you re asleep. That s when the use of a splint can often help:    How a splint works. A splint is an appliance that fits in the mouth. It may also be called an orthotic or . There are different kinds of splints for different kinds of needs. A splint can keep the upper and lower teeth apart. This helps protect tooth surfaces from grinding. A splint can also be made to reduce strain on the area.    Wearing and caring for your splint. To make a splint, your dentist or orthodontist may take impressions of your teeth. Then a splint will be made to fit your mouth. A splint:    May be worn during the day or only at night. Be sure to ask when and how often you should wear your splint.    Should be  cleaned before you put it in and after you take it out. Ask your dentist or orthodontist how to clean the splint.    Should be kept in a protective case, away from the reach of children and pets. This helps keep the splint from getting dirty or broken.  If your bite is incorrect  Malocclusion means the jaws or teeth don t fit together properly. This can result in pain and problems with jaw function. If your jaws or teeth are out of alignment, orthodontic treatment may help. If your bad bite is due to missing or damaged teeth, you may receive restorative treatment.    Restorative treatment. A bad bite can be caused by missing or damaged teeth. A dentist can restore teeth in many ways:    A crown is made of ceramic, metal, or porcelain and metal. It is cemented in place to repair a broken or damaged tooth.    A bridge is a false tooth fused between 2 crowns.    A dental implant is an artificial tooth root. It is attached to the jawbone as a base for an artificial tooth.    Orthodontic treatment. Sometimes the upper and lower jaws are out of alignment. Or teeth are out of line, turned, crowded, or spaced too far apart. Your orthodontist can align teeth with braces and other devices. This helps provide a more comfortable bite.  If surgery is needed  Surgery is rarely needed for TMD. However, if other treatments haven t worked, you may be referred to an oral and maxillofacial surgeon. Talk to your dentist about whether surgery might be right for you.     8281-0675 [employer]. [address] [place] [address]. All rights reserved. This information is not intended as a substitute for professional medical care. Always follow your healthcare professional's instructions.        Pain Relief Methods for Temporomandibular Disorders (TMD)  You have been diagnosed with temporomandibular disorder (TMD). This term describes a group of problems related to the temporomandibular joint (TMJ)and nearby muscles.  The TMJ is located where the upper and lower jaws meet. TMD can cause painful and frustrating symptoms. But your health care provider can recommend various pain relief methods as part of your treatment. These may include medicines and certain types of therapy, like massage or gentle exercise.  Using medicines    Medicines may be prescribed to treat TMD. Others may be available over-the-counter. The medicine type and dosage will depend on the problem you have. For your safety, tell your health care provider if you are currently taking any medicines. Also mention any herbs or supplements you are using. Common medicines used to treat TMD include:    Anti-inflammatories and analgesics. These treat pain, inflammation, osteoarthritis, and rheumatoid arthritis. Anti-inflammatories reduce swelling, heat, redness, and pain. They also help restore function. Analgesics reduce pain. Nonsteroidal anti-inflammatories (NSAIDs) relieve inflammation as well as pain.    Muscle relaxants. These treat myofascial pain. This is pain that occurs in the soft tissues or muscles around the TMJ. Muscle relaxants help ease muscle tension. This reduces pressure on the TMJ from tight jaw muscles.    Antidepressants. These can be used to reduce pain or bruxism (teeth grinding). At higher dosages, these medicines are used to treat depression. Given at low dosages, antidepressants help relieve TMD symptoms. They can reduce muscle pain. They also raise the level of serotonin, a body chemical that improves sleep. This in turn can decrease bruxism during the night.  Treating painful muscles  A trigger point is a painful spot in a tight muscle. It is often painful to the touch and may refer pain to other places. Your health care provider can focus on trigger points using:    Massage, both inside and outside the mouth. This relaxes muscles and improves circulation.    Palpation, which is applying pressure to points of the jaw and face with the  fingers.    Cold spray and stretching of the muscles to relax them.    An anesthetic for pain relief. This may be given as an injection by your dentist.  Treating the joint  Therapy may focus directly on the TMJ. There are different ways to treat the joint:    A self-care program helps you treat and manage symptoms on your own. Your program may include exercises. It may also include using ice and heat to relieve pain.    Gentle manipulation reduces pain and restores range of motion. The health care provider uses his or her hands to relax muscles and ligaments around the joint.    Exercises strengthen muscles in the jaw and face.    Ultrasound uses sound waves to reduce pain and swelling. It also improves pain and swelling.  Treating inflammation  When the joint is inflamed, movement becomes difficult -- even impossible at times. Your health care provider can help. Treatment may include:    Rest and gentle exercise to increase range of motion. One common exercise is to apply pressure to the jaw and resist the movement (isometric exercise).    A gel pack or ice wrapped in a towel applied for 10 to 20 minutes repeated 3 or 4 times a day. This eases swelling and reduces pain.    Massage and gentle manipulation as described above.    1589-7526 [employer]. [address] [place] [address]. All rights reserved. This information is not intended as a substitute for professional medical care. Always follow your healthcare professional's instructions.        Self-Care for Temporomandibular Disorders (TMD)  You have temporomandibular disorder (TMD). This term describes a group of problems related to the temporomandibular joint (TMJ) and nearby muscles. The TMJ is located where the upper and lower jaws meet. Treatment will get your jaw back to normal function. But your care doesn t end there. Once you ve had TMD, it s important to avoid reinjury. Get in the habit of doing self-checks. This can make you  aware of any symptoms that begin to return, so you can take action right away.    Doing self-checks  Make it a habit to assess your body a few times each day. Try writing yourself a reminder. Or set an alarm on your watch or computer. When doing a self-check, ask yourself:    Do I feel stressed?    Are my muscles tense?    Am I grinding or clenching my teeth?    Is my posture healthy for my body?    Is there anything I can do to make myself more comfortable?  If you answer  yes  to any of the questions above, you need to take action. Adjusting your posture or taking a short break can help prevent or relieve TMD symptoms.  Listening to your body  Many people get used to ignoring pain. But pain is a signal that your body needs care. To maintain your TMJ health:    Avoid hard or chewy foods. Even if you feel fine, eating such foods can trigger symptoms again.    Be aware of your body. Don t ignore TMD symptoms. The nagging pain in your neck or jaw may be a sign that you need care.    Be sure to keep follow-up appointments with your health care team.  Managing stress  Stress is a key factor in TMD. Stress can cause you to clench your muscles or grind your teeth. It can also affect your sleep, reducing your body s ability to heal. Here are a few tips to manage stress:    Learn ways to relax. Try listening to music or gently stretching. Take a few slow deep breaths. Or, close your eyes and imagine a place or object that is calming.    Get plenty of rest and sleep.    Set goals you know you can attain.    Make time for people and things you enjoy.    Ask for help if you need it. Friends and family can run errands and cook meals for you.   Staying active  Activity helps the body in many ways. You stay looser and more relaxed. It also helps keep muscles and tissues conditioned. That way you can heal faster and make reinjury less likely. Here are some tips to get you started:    Talk to your health care provider before  starting an exercise program.    Always warm up and stretch before each activity. This helps prevent injury.    Try walking or swimming. These activities are easy on your joints. They also benefit your heart and lungs.    Try yoga or adrian chi. These are relaxing activities known for reducing stress.    2648-8429 [employer]. [address] [place] [address]. All rights reserved. This information is not intended as a substitute for professional medical care. Always follow your healthcare professional's instructions.        Understanding Temporomandibular Disorders (TMD)  Do you have pain in your face, jaw, or teeth? Do you have trouble chewing? Does your jaw make clicking or popping noises? These symptoms can be caused by temporomandibular disorders (TMD). This term describes a group of problems related to the temporomandibular joint (TMJ) and nearby muscles. Your symptoms may be painful and frustrating. But don t worry. Your health care team can help you treat TMD and prevent future problems.  What s wrong?    TMD causes many kinds of symptoms. That s part of the reason it can be hard to diagnose. You may have headaches, tooth pain, or muscle aches. Your pain may be constant. Or it may come and go without any apparent reason. TMD-related problems include:    Tight muscles    Joint inflammation    Joint damage    Teeth grinding or clenching  What can you do?  If you are having TMD symptoms, don t wait. Call your dentist or health care provider right away. You don t have to live with pain or discomfort. TMD can be treated. In fact, a key part of treatment is learning to manage your condition at home.  Which treatment is right for you?  Treatment helps rest the muscles and joint. It also helps relieve symptoms and restore function. Depending on the type of problem you have, your treatment plan may include:    Temporary diet changes.    New habits for managing stress and maintaining the health of  your jaw.    Medicine to reduce pain and inflammation.    Therapy to reduce pressure on the joint and restore function.    Dental treatment to reduce pressure on the joint.  How can you avoid future problems?  Treatment can help relieve your current condition. But TMD symptoms may return over time. You can avoid future problems by maintaining the health of your jaw:    Avoid foods and habits that make your symptoms worse.    Lower the stress level in your life.    Follow your treatment plan.    Pay attention to your body and get help if symptoms return.    3353-9471 [employer]. [address] [place] [address]. All rights reserved. This information is not intended as a substitute for professional medical care. Always follow your healthcare professional's instructions.        When You Have Temporomandibular Disorder (TMD)  The temporomandibular joint (TMJ) is a ball-and-socket joint located where the upper and lower jaws meet. The TMJ and its nearby muscles make up a complex, loosely connected system. Because of this, a problem in one part of the system can affect the other parts. This can cause you to have temporomandibular disorder (TMD).         How the temporomandibular joint works  You have 1 joint on each side of your mouth that together make up the TMJ. These joints are part of a large group of muscles, ligaments, and bones that work together as a system. When the system is healthy, you can talk, chew, and even yawn in comfort. Muscles contract and relax to open and close the joint. The disk is made of cartilage and is located between the condyle and the skull. It absorbs pressure in the joint and allows the jaws to open and close smoothly. Fluid (called synovial fluid) lubricates the joints. Ligaments connect the lower jaw bones to the skull. They also support the joint.  Common temporomandibular problems  When there is a problem with the TMJ and its related system, you can develop  TMD. Common TMD problems include tight muscles, inflamed joints, and damaged joints.  In some cases, symptoms may be related to the teeth or bite.    Tight muscles. The muscles surrounding the TMJ can go into spasm (tighten) and cause pain. Referred pain happens in a part of the body separate from the source of the problem. For example, pain in the face or teeth could be coming from a problem in the TMJ. Myofascial pain happens in soft tissues, like muscle. Trigger points in these pain areas often cause referred pain. You may feel jaw, neck, or shoulder pain.    Inflamed joints. Inflammation may include pain, redness, heat, swelling, or loss of function. Synovitis happens when certain tissues surrounding the TMJ become inflamed. It causes pain that increases with jaw movement. Inflamed ligamentscan be caused by strain or injury. When this happens, the ligaments are unable to support the joint. Rheumatoid arthritis is a joint disease. It leads to inflammation in the TMJ.    Damaged joints. Many people hear clicking when their jaw moves. If you feel pain along with the noise, the joint may be damaged. Impingement happens when the disk slips out of place (displacement). This causes the jaw to catch. As the disk slips, you may hear a clicking sound. Locked jaw happens when the disk gets stuck in one position. As a result, the jaw locks open or closed. Osteoarthritis is a joint disease. It causes the TMJ to wear away (degenerate). This leads to pain during movement.     Other problems  The parts of the jaw and mouth make up a single unit. That s why a problem in 1 area can cause symptoms elsewhere. Teeth or bite problems associated with TMD include:    Bruxism (grinding your teeth side to side)    Clenching (biting down on your teeth)    Malocclusion (when the teeth or bite is out of alignment)  Your health care provider will give you more information about these problems if needed.     3169-6016 [employer] [address] [place] [address]. All rights reserved. This information is not intended as a substitute for professional medical care. Always follow your healthcare professional's instructions.        TMJ Syndrome  The temporomandibular joint (TMJ) is the joint that connects your lower jaw to your head. You can feel it in front of your ears when you open and close your mouth. TMJ disorders involve chronic or recurrent pain in the joint. When treated, symptoms of TMJ disorders usually go away within a few months.  Causes  There is no widely agreed-on cause of TMJ disorders. They have been linked to injury, arthritis, chronic fatigue syndrome, and fibromyalgia. A definite connection has not been shown, though.  Symptoms    Pain in the face, jaw, or neck    Pain with jaw movement or chewing    Locking or catching sensation of the jaw    Clicking, popping, or grinding sounds with movement of the TMJ    Headache    Ear pain  Home care  Modest, nonsurgical treatments are a good first step toward relieving symptoms. Try the approaches described below.    Rest the jaw by avoiding crunchy or hard-to-chew foods. Do not eat hard or sticky candies. Soft foods and liquids are easier on the jaw.    Protect your jaw while yawning. If you need to yawn, put your fist under your chin to prevent your mouth from opening up too wide.    To help relieve pain, try applying hot or cold packs to the painful area. Try both hot and cold to find out which works best for you. If you use hot packs (small towels soaked in hot water), be careful not to burn yourself.    You may take acetaminophen or ibuprofen for pain, unless you were given a different pain medicine. (Note: If you have chronic liver or kidney disease or have ever had a stomach ulcer or gastrointestinal bleeding, talk with your healthcare provider before using these medicines. Also talk to your provider if you are taking medicine to prevent blood clots.) Aspirin  should never be given to anyone younger than 18 years of age who is ill with a viral infection or fever. It may cause severe liver or brain damage.  Reducing stress  If stress seems to be contributing to your symptoms, try to identify the sources of stress in your life. These aren t always obvious. Common stressors include:    Everyday hassles (which can add up), such as traffic jams, missed appointments, or car trouble    Major life changes, both good (such as a new baby or job promotion) and bad (loss of job or loss of a loved one)    Overload: The feeling that you have too many responsibilities and can't take care of everything at once    Helplessness: Feeling like your problems are more than you can solve  When possible, do something about your sources of stress. See if you can avoid hassles, limit the amount of change in your life at one time, and take breaks when you feel overloaded.  Unfortunately, many stressful situations cannot be avoided. So learning how to manage stress better is very important. Getting regular exercise, eating nutritious, balanced meals, and getting adequate rest all help to make everyday stress more manageable. Certain techniques are also helpful: relaxation and breathing exercises, visualization, biofeedback, meditation, or simply taking some time out to clear your mind. For more information, talk with your healthcare provider.  Follow-up care  Follow up with your healthcare provider, or as advised. Further testing and additional treatment may be required. If changes to your lifestyle do not improve your symptoms, talk with your healthcare provider about other available therapies. These include bite guards for help with teeth grinding, stress management techniques, and more. If stress is an important factor and does not respond to the above simple measures, talk to your doctor about a referral for stress management.    If X-rays were done, they will be reviewed by a specialist. You  will be notified of the results, especially if they affect treatment.  Call 911  Call emergency services right away if any of these occur:    Trouble breathing or swallowing, wheezing    Confusion    Extreme drowsiness or trouble awakening    Fainting or loss of consciousness    Rapid heart rate  When to seek medical advice  Call your healthcare provider right away if any of these occur:    Your face becomes swollen or red.    Your pain worsens.    You have increasing neck, mouth, tooth, or throat pain.    You develop a fever of 100.4F (38 C) or higher    7663-6179 [employer]. [address] [place] [address]. All rights reserved. This information is not intended as a substitute for professional medical care. Always follow your healthcare professional's instructions.                Follow-ups after your visit        Additional Services     [facility] PT, HAND, AND CHIROPRACTIC REFERRAL       **This order will print in the [facility] Scheduling Office**    Physical Therapy, Hand Therapy and Chiropractic Care are available through:    *[facility]  *[facility]  *[facility]    Call one number to schedule at any of the above locations: [phone].    Your provider has referred you to: Chiropractic at [facility] or [facility]    Indication/Reason for Referral: Temporomandibular Dysfunction  Onset of Illness: several yrs  Therapy Orders: Evaluate and Treat  Special Programs: None  Special Request: None    [place]      Additional Comments for the Therapist or Chiropractor:     Please be aware that coverage of these services is subject to the terms and limitations of your health insurance plan.  Call member services at your health plan with any benefit or coverage questions.      Please bring the following to your appointment:    *Your personal calendar for scheduling future appointments  *Comfortable clothing            ORAL SURGERY REFERRAL       Your provider  "has referred you to: [facility]  [phone]  (NOT any MA plan)       Please be aware that coverage of these services is subject to the terms and limitations of your health insurance plan.  Call member services at your health plan with any benefit or coverage questions.      Please bring the following to your appointment:    >>   Any x-rays, CTs or MRIs which have been performed.  Contact the facility where they were done to arrange for  prior to your scheduled appointment.    >>   List of current medications   >>   This referral request   >>   Any documents/labs given to you for this referral                  Who to contact     Normal or non-critical lab and imaging results will be communicated to you by [employer], letter or phone within 4 business days after the clinic has received the results. If you do not hear from us within 7 days, please contact the clinic through [employer] or phone. If you have a critical or abnormal lab result, we will notify you by phone as soon as possible.  Submit refill requests through [employer] or call your pharmacy and they will forward the refill request to us. Please allow 3 business days for your refill to be completed.          If you need to speak with a  for additional information , please call: [phone]             Additional Information About Your Visit        [employer] Information     [employer] lets you send messages to your doctor, view your test results, renew your prescriptions, schedule appointments and more. To sign up, go to [email or web address] . Click on \"Log in\" on the left side of the screen, which will take you to the Welcome page. Then click on \"Sign up Now\" on the right side of the page.     You will be asked to enter the access code listed below, as well as some personal information. Please follow the directions to create your username and password.     Your access code is: [email or web address]  Expires: 6/28/2017 " 12:39 PM     Your access code will  in 90 days. If you need help or a new code, please call your [place] clinic or [phone].        Care EveryWhere ID     This is your Care EveryWhere ID. This could be used by other organizations to access your [place] medical records  [id number]        Your Vitals Were     Pulse BMI (Body Mass Index)                106 39.11 kg/m2           Blood Pressure from Last 3 Encounters:   17 130/81   17 111/78   10/20/16 125/75    Weight from Last 3 Encounters:   17 235 lb (106.6 kg) (>99 %)*   17 237 lb 9.6 oz (107.8 kg) (>99 %)*   10/20/16 245 lb 8 oz (111.4 kg) (>99 %)*     * Growth percentiles are based on [facility] 2-20 Years data.              We Performed the Following     [facility] PT, HAND, AND CHIROPRACTIC REFERRAL     ORAL SURGERY REFERRAL          Today's Medication Changes          These changes are accurate as of: 17  3:34 PM.  If you have any questions, ask your nurse or doctor.               Start taking these medicines.        Dose/Directions    diazepam 5 MG tablet   Commonly known as:  VALIUM   Used for:  TMJ (temporomandibular joint syndrome)   Started by:  Vijay Hammonds PA-C        Dose:  5 mg   Take 1 tablet (5 mg) by mouth every 12 hours as needed for anxiety or sleep   Quantity:  20 tablet   Refills:  0       predniSONE 10 MG tablet   Commonly known as:  DELTASONE   Used for:  TMJ (temporomandibular joint syndrome)   Started by:  Vijay Hammonds PA-C        Take 60 mg days 1 and 2, 50 mg days 3 and 4, 40 mg days 5 and 6, 30 mg days 7 and 8, 20 mg days 9 and 10, 10 mg days 11 and 12   Quantity:  42 tablet   Refills:  0            Where to get your medicines      These medications were sent to [facility] - [address]  [address] [address]     Phone:  [phone]     predniSONE 10 MG tablet         Some of these will need a paper prescription and others can be bought over  the counter.  Ask your nurse if you have questions.     Bring a paper prescription for each of these medications     diazepam 5 MG tablet                Primary Care Provider Office Phone # Fax #    Blanche Rojas PA-C [phone]       [facility] [address] [facility] [address]        Thank you!     Thank you for choosing [facility]  for your care. Our goal is always to provide you with excellent care. Hearing back from our patients is one way we can continue to improve our services. Please take a few minutes to complete the written survey that you may receive in the mail after your visit with us. Thank you!             Your Updated Medication List - Protect others around you: Learn how to safely use, store and throw away your medicines at www.disposemymeds.org.          This list is accurate as of: 6/8/17  3:34 PM.  Always use your most recent med list.                   Brand Name Dispense Instructions for use    diazepam 5 MG tablet    VALIUM    20 tablet    Take 1 tablet (5 mg) by mouth every 12 hours as needed for anxiety or sleep       ibuprofen 600 MG tablet    ADVIL/MOTRIN    120 tablet    Take 1 tablet by mouth every 6 hours as needed for pain.       IRON SUPPLEMENT PO      Reported on 3/30/2017       levothyroxine 25 MCG tablet    SYNTHROID/LEVOTHROID    90 tablet    Take 1 tablet (25 mcg) by mouth daily       norethindrone 0.35 MG per tablet    MICRONOR    90 tablet    Take 1 tablet (0.35 mg) by mouth daily       predniSONE 10 MG tablet    DELTASONE    42 tablet    Take 60 mg days 1 and 2, 50 mg days 3 and 4, 40 mg days 5 and 6, 30 mg days 7 and 8, 20 mg days 9 and 10, 10 mg days 11 and 12

## 2017-06-29 ENCOUNTER — THERAPY VISIT (OUTPATIENT)
Dept: PHYSICAL THERAPY | Facility: CLINIC | Age: 22
End: 2017-06-29
Payer: COMMERCIAL

## 2017-06-29 DIAGNOSIS — M79.18 MYOFASCIAL MUSCLE PAIN: ICD-10-CM

## 2017-06-29 DIAGNOSIS — M54.2 CERVICALGIA: ICD-10-CM

## 2017-06-29 DIAGNOSIS — M26.609 TMJ (TEMPOROMANDIBULAR JOINT SYNDROME): Primary | ICD-10-CM

## 2017-06-29 PROCEDURE — 97140 MANUAL THERAPY 1/> REGIONS: CPT | Mod: GP | Performed by: PHYSICAL THERAPIST

## 2017-06-29 PROCEDURE — 97161 PT EVAL LOW COMPLEX 20 MIN: CPT | Mod: GP | Performed by: PHYSICAL THERAPIST

## 2017-06-29 PROCEDURE — 97110 THERAPEUTIC EXERCISES: CPT | Mod: GP | Performed by: PHYSICAL THERAPIST

## 2017-06-29 NOTE — MR AVS SNAPSHOT
After Visit Summary   6/29/2017    Cathy Galloway    MRN: [id number]           Patient Information     Date Of Birth          1995        Visit Information        Provider Department      6/29/2017 4:10 PM Rolly King, PT [facility] [place] [facility]        Today's Diagnoses     TMJ (temporomandibular joint syndrome)    -  1    Myofascial muscle pain        Cervicalgia           Follow-ups after your visit        Your next 10 appointments already scheduled     Jul 06, 2017  4:10 PM CDT   [facility] Spine with Rolly King, PT   [facility] [place] [facility] ([facility] [place])    [address]  [place] [address]   [phone]            Jul 13, 2017 12:10 PM CDT   [facility] Spine with Rolly King PT   [facility] [place] [facility] ([facility] [place])    [address]  [place] [address]   [phone]            Jul 20, 2017 11:40 AM CDT   [facility] Spine with Rolly King PT   [facility] [place] [facility] ([facility] [place])    [address]  [place] [address]   [phone]              Who to contact     If you have questions or need follow up information about today's clinic visit or your schedule please contact [facility] [place] [facility] directly at [phone].  Normal or non-critical lab and imaging results will be communicated to you by MyChart, letter or phone within 4 business days after the clinic has received the results. If you do not hear from us within 7 days, please contact the clinic through MyChart or phone. If you have a critical or abnormal lab result, we will notify you by phone as soon as possible.  Submit refill requests through [employer] or call your pharmacy and they will forward the refill request to us. Please allow 3 business days for your refill to be completed.          Additional Information About Your Visit        MyChart Information      "[employer] lets you send messages to your doctor, view your test results, renew your prescriptions, schedule appointments and more. To sign up, go to [email or web address] . Click on \"Log in\" on the left side of the screen, which will take you to the Welcome page. Then click on \"Sign up Now\" on the right side of the page.     You will be asked to enter the access code listed below, as well as some personal information. Please follow the directions to create your username and password.     Your access code is: [email or web address]  Expires: 2017  9:04 PM     Your access code will  in 90 days. If you need help or a new code, please call your [facility] clinic or [phone].        Care EveryWhere ID     This is your Care EveryWhere ID. This could be used by other organizations to access your [facility] medical records  [id number]         Blood Pressure from Last 3 Encounters:   17 130/81   17 111/78   10/20/16 125/75    Weight from Last 3 Encounters:   17 106.6 kg (235 lb) (>99 %)*   17 107.8 kg (237 lb 9.6 oz) (>99 %)*   10/20/16 111.4 kg (245 lb 8 oz) (>99 %)*     * Growth percentiles are based on [facility] 2-20 Years data.              We Performed the Following     HC PT EVAL, LOW COMPLEXITY     [facility] INITIAL EVAL REPORT     MANUAL THER TECH,1+REGIONS,EA 15 MIN     THERAPEUTIC EXERCISES        Primary Care Provider Office Phone # Fax #    Blanche Rojas PA-C [phone]       [facility] [place] [address]  [place] [address]        Equal Access to Services     NOE JEONG : Hadii ralph Bardales, waanandda julietqadaha, qaybta kaalmada adedahianada, javon [place]. So [place] [phone].    ATENCIÓN: Si habla español, tiene a gore disposición servicios gratuitos de asistencia lingüística. Llame al [phone].    We comply with applicable federal civil rights laws and [place] laws. We do not discriminate on the basis of race, color, national " origin, age, disability sex, sexual orientation or gender identity.            Thank you!     Thank you for choosing [facility]  for your care. Our goal is always to provide you with excellent care. Hearing back from our patients is one way we can continue to improve our services. Please take a few minutes to complete the written survey that you may receive in the mail after your visit with us. Thank you!             Your Updated Medication List - Protect others around you: Learn how to safely use, store and throw away your medicines at www.disposemymeds.org.          This list is accurate as of: 6/29/17 11:59 PM.  Always use your most recent med list.                   Brand Name Dispense Instructions for use Diagnosis    diazepam 5 MG tablet    VALIUM    20 tablet    Take 1 tablet (5 mg) by mouth every 12 hours as needed for anxiety or sleep    TMJ (temporomandibular joint syndrome)       ibuprofen 600 MG tablet    ADVIL/MOTRIN    120 tablet    Take 1 tablet by mouth every 6 hours as needed for pain.    Aortic stenosis       IRON SUPPLEMENT PO      Reported on 3/30/2017        levothyroxine 25 MCG tablet    SYNTHROID/LEVOTHROID    90 tablet    Take 1 tablet (25 mcg) by mouth daily    Hypothyroidism due to acquired atrophy of thyroid       norethindrone 0.35 MG per tablet    MICRONOR    90 tablet    Take 1 tablet (0.35 mg) by mouth daily    Dysmenorrhea       predniSONE 10 MG tablet    DELTASONE    42 tablet    Take 60 mg days 1 and 2, 50 mg days 3 and 4, 40 mg days 5 and 6, 30 mg days 7 and 8, 20 mg days 9 and 10, 10 mg days 11 and 12    TMJ (temporomandibular joint syndrome)

## 2017-06-29 NOTE — PROGRESS NOTES
"[facility] Initial Evaluation    Subjective:    Patient is a 21 year old female presenting with rehab tmj hpi.   Cathy Galloway is a 21 year old female with a TMJ left and TMJ right (more on the left) condition.  Condition occurred with:  Insidious onset.  Condition occurred: for unknown reasons.  This is a chronic condition  Onset about 3 years ago. Started sometime after getting her braces off. Worse over the last month without injury. Date of MD order 6/8/17.    Patient reports pain:  TMJ left and TMJ right (more left).    Pain is described as aching and sharp and is constant and reported as 6/10.  Associated symptoms:  Locking: open or closed, headache cervical, stiffness/limited opening, muscle tightness, other, bite feels off and joint noise (closed lock; frequent neck stiffness; joint noise - high pitched \"squishing\" and clicking/popping). Pain is worse in the A.M..  Symptoms are exacerbated by chewing, talking, dental appointments, clenching and stress and relieved by NSAID's, heat and muscle relaxants (self massage is minimally helpful; steroid pack - not helpful ).  Since onset symptoms are gradually worsening.  Special tests:  X-ray (at dentist - \"muscles are bigger\").      General health as reported by patient is excellent.  Pertinent medical history includes:  High blood pressure, overweight and thyroid problems.  Medical allergies: no.  Other surgeries include:  Heart surgery (open heart surgery).  Current medications:  Thyroid medication (birth control).  Current occupation is Student and .  Patient is working in normal job without restrictions (but difficult to talk as much as needed for job tasks).  Primary job tasks include:  Repetitive tasks and prolonged standing (talking).    Barriers include:  None as reported by the patient.    Red flags:  None as reported by the patient.    Patient's goal is to have less pain and be able to open her mouth farther.              "       Objective:    System                                      TMJ Evaluation  ROM:     AROM Cervical:    Flexion: WNL with end range strain neck Overpressure: Pain:  Extension: WNL with end range strain in chest/scar Overpressure: Pain:  Left Side Bend: WNL Overpressure: Pain:  Right Side Bend: WNL Overpressure: Pain:  Left Rotation: WNL Overpressure: Pain:  Right Rotation: min loss Overpressure: Pain:  AROM TMJ:  Openin mm with pain     Left Laterotrusion:  10 mm    Right Laterotrusion:  10 mm    Protrusion: 8 mm        Associated Findings: Headaches:  Cervical and temporal  Parafunctional Habits:    Bruxism:  Night    Chewing/Biting Nails:  Occasional nail biting and rests head on hands    Clenching:  Day and night - aware and is trying to correct    Caffeine:  1 soda per day    Aerobic Exercise:  Swimming 2 X week, walking at work  Sleep Quality:  Interrupted - difficulty getting to sleep due to jaw pain  Neurological:  neuro exam not indicated and deferred        Palpation:    Left side tenderness present at:  Erector Spinae and Lateral Pterygoid  Left side tenderness not present at: Superficial Masseter; Deep Masseter; Temporalis or Medial Pterygoid    Right side tendernesss not present at:  Superficial Masseter; Deep Masseter; Temporalis; Medial Pterygoid or Lateral Pterygoid      TMJ Findings:  Tmj findings: decreased joint mobility on the left; clench test positive for pain on the left with both left and right testing (indicative of both muscle and disc/joint involvement)            Capsule Palpation:  Left side tenderness present at :  Lateral Capsule; Superior Capsule and Posterior Capsule    Right side tenderness not present at:  Lateral Capsule or Posterior Capsule                General     ROS    Assessment/Plan:      Patient is a 21 year old female with left side TMJ complaints.    Patient has the following significant findings with corresponding treatment plan.                Diagnosis 1:   TMD - disc displacement without reduction and myofascial pain    Pain -  manual therapy, education, home program and dry needline  Decreased ROM/flexibility - manual therapy, therapeutic exercise and home program  Decreased joint mobility - manual therapy, therapeutic exercise and home program  Impaired muscle performance - neuro re-education and home program  Impaired posture - neuro re-education and home program    Therapy Evaluation Codes:   1) History comprised of:   Personal factors that impact the plan of care:      None.    Comorbidity factors that impact the plan of care are:      None.     Medications impacting care: None.  2) Examination of Body Systems comprised of:   Body structures and functions that impact the plan of care:      Cervical spine and TMJ.   Activity limitations that impact the plan of care are:      eating, talking, concentration.  3) Clinical presentation characteristics are:   Stable/Uncomplicated.  4) Decision-Making    Low complexity using standardized patient assessment instrument and/or measureable assessment of functional outcome.  Cumulative Therapy Evaluation is: Low complexity.    Previous and current functional limitations:  (See Goal Flow Sheet for this information)    Short term and Long term goals: (See Goal Flow Sheet for this information)     Communication ability:  Patient appears to be able to clearly communicate and understand verbal and written communication and follow directions correctly.  Treatment Explanation - The following has been discussed with the patient:   RX ordered/plan of care  Anticipated outcomes  Possible risks and side effects  This patient would benefit from PT intervention to resume normal activities.   Rehab potential is good.    Frequency:  1 X week, once daily  Duration:  for 4 weeks  Discharge Plan:  Achieve all LTG.  Independent in home treatment program.  Reach maximal therapeutic benefit.    Please refer to the daily flowsheet for treatment  today, total treatment time and time spent performing 1:1 timed codes.

## 2017-07-06 ENCOUNTER — THERAPY VISIT (OUTPATIENT)
Dept: PHYSICAL THERAPY | Facility: CLINIC | Age: 22
End: 2017-07-06
Payer: COMMERCIAL

## 2017-07-06 DIAGNOSIS — M26.609 TMJ (TEMPOROMANDIBULAR JOINT SYNDROME): ICD-10-CM

## 2017-07-06 DIAGNOSIS — M54.2 CERVICALGIA: ICD-10-CM

## 2017-07-06 DIAGNOSIS — M79.18 MYOFASCIAL MUSCLE PAIN: ICD-10-CM

## 2017-07-06 PROCEDURE — 97112 NEUROMUSCULAR REEDUCATION: CPT | Mod: GP | Performed by: PHYSICAL THERAPIST

## 2017-07-06 PROCEDURE — 97140 MANUAL THERAPY 1/> REGIONS: CPT | Mod: GP | Performed by: PHYSICAL THERAPIST

## 2017-07-06 PROCEDURE — 97110 THERAPEUTIC EXERCISES: CPT | Mod: GP | Performed by: PHYSICAL THERAPIST

## 2017-07-06 NOTE — PROGRESS NOTES
Subjective:    HPI                    Objective:    System    Physical Exam    General     ROS    Assessment/Plan:      SUBJECTIVE  Subjective changes as noted by pt: Symptoms are about the same. Had some soreness for 2 days after last treatment. No change in ability to chew but there has been a decrease in headaches.   Current pain level: 0/10 (4-5/10 with talking)   Changes in function:  Yes (See Goal flowsheet attached for changes in current functional level)     Adverse reaction to treatment or activity:  None    OBJECTIVE  Changes in objective findings: AROM opening 40 mm with ERP, laterotrusion 10 mm bilaterally.     ASSESSMENT  Cathy continues to require intervention to meet STG and LTG's: PT  Patient is progressing as expected.  Response to therapy has shown an improvement in  pain level and ROM   Progress made towards STG/LTG?  Yes (See Goal flowsheet attached for updates on achievement of STG and LTG)    PLAN  Current treatment program is being advanced to more complex exercises.    PTA/ATC plan:  N/A    Please refer to the daily flowsheet for treatment today, total treatment time and time spent performing 1:1 timed codes.

## 2017-07-06 NOTE — MR AVS SNAPSHOT
"              After Visit Summary   7/6/2017    Cathy Galloway    MRN: [id number]           Patient Information     Date Of Birth          1995        Visit Information        Provider Department      7/6/2017 4:10 PM Rolly King, PT [facility] [place] [facility]        Today's Diagnoses     TMJ (temporomandibular joint syndrome)        Myofascial muscle pain        Cervicalgia           Follow-ups after your visit        Your next 10 appointments already scheduled     Jul 13, 2017 12:10 PM CDT   [facility] Spine with Rolly King PT   [facility] [place] [facility] ([facility] [place])    [address]  [place] [address]   [phone]            Jul 20, 2017 11:40 AM CDT   [facility] Spine with Rolly King PT   [facility] [place] [facility] ([facility] [place])    [address]  [place] [address]   [phone]              Who to contact     If you have questions or need follow up information about today's clinic visit or your schedule please contact [facility] [place] [facility] directly at [phone].  Normal or non-critical lab and imaging results will be communicated to you by [employer], letter or phone within 4 business days after the clinic has received the results. If you do not hear from us within 7 days, please contact the clinic through [employer] or phone. If you have a critical or abnormal lab result, we will notify you by phone as soon as possible.  Submit refill requests through [employer] or call your pharmacy and they will forward the refill request to us. Please allow 3 business days for your refill to be completed.          Additional Information About Your Visit        MyChart Information     [employer] lets you send messages to your doctor, view your test results, renew your prescriptions, schedule appointments and more. To sign up, go to [email or web address] . Click on \"Log in\" on the left side of the " "screen, which will take you to the Welcome page. Then click on \"Sign up Now\" on the right side of the page.     You will be asked to enter the access code listed below, as well as some personal information. Please follow the directions to create your username and password.     Your access code is: [email or web address]  Expires: 2017  9:04 PM     Your access code will  in 90 days. If you need help or a new code, please call your [facility] clinic or [phone].        Care EveryWhere ID     This is your Care EveryWhere ID. This could be used by other organizations to access your [facility] medical records  [id number]         Blood Pressure from Last 3 Encounters:   17 130/81   17 111/78   10/20/16 125/75    Weight from Last 3 Encounters:   17 106.6 kg (235 lb) (>99 %)*   17 107.8 kg (237 lb 9.6 oz) (>99 %)*   10/20/16 111.4 kg (245 lb 8 oz) (>99 %)*     * Growth percentiles are based on [facility] 2-20 Years data.              We Performed the Following     MANUAL THER TECH,1+REGIONS,EA 15 MIN     NEUROMUSCULAR RE-EDUCATION     THERAPEUTIC EXERCISES        Primary Care Provider Office Phone # Fax #    Blanche Rojas PA-C [phone]       [facility] [place] [address]  [place] [address]        Equal Access to Services     [facility]: Hadii aad ku hadasho Soomaali, waaxda luqadaha, qaybta kaalmada kaelynegyasruthi, javon blake . So [facility] [phone].    ATENCIÓN: Si danialla andrew, tiene a gore disposición servicios gratuitos de asistencia lingüística. Bi al [phone].    We comply with applicable federal civil rights laws and [place] laws. We do not discriminate on the basis of race, color, national origin, age, disability sex, sexual orientation or gender identity.            Thank you!     Thank you for choosing [facility]  for your care. Our goal is always to provide you with excellent care. Hearing back from " our patients is one way we can continue to improve our services. Please take a few minutes to complete the written survey that you may receive in the mail after your visit with us. Thank you!             Your Updated Medication List - Protect others around you: Learn how to safely use, store and throw away your medicines at www.disposemymeds.org.          This list is accurate as of: 7/6/17  4:38 PM.  Always use your most recent med list.                   Brand Name Dispense Instructions for use Diagnosis    diazepam 5 MG tablet    VALIUM    20 tablet    Take 1 tablet (5 mg) by mouth every 12 hours as needed for anxiety or sleep    TMJ (temporomandibular joint syndrome)       ibuprofen 600 MG tablet    ADVIL/MOTRIN    120 tablet    Take 1 tablet by mouth every 6 hours as needed for pain.    Aortic stenosis       IRON SUPPLEMENT PO      Reported on 3/30/2017        levothyroxine 25 MCG tablet    SYNTHROID/LEVOTHROID    90 tablet    Take 1 tablet (25 mcg) by mouth daily    Hypothyroidism due to acquired atrophy of thyroid       norethindrone 0.35 MG per tablet    MICRONOR    90 tablet    Take 1 tablet (0.35 mg) by mouth daily    Dysmenorrhea       predniSONE 10 MG tablet    DELTASONE    42 tablet    Take 60 mg days 1 and 2, 50 mg days 3 and 4, 40 mg days 5 and 6, 30 mg days 7 and 8, 20 mg days 9 and 10, 10 mg days 11 and 12    TMJ (temporomandibular joint syndrome)

## 2017-07-13 ENCOUNTER — THERAPY VISIT (OUTPATIENT)
Dept: PHYSICAL THERAPY | Facility: CLINIC | Age: 22
End: 2017-07-13
Payer: COMMERCIAL

## 2017-07-13 DIAGNOSIS — M54.2 CERVICALGIA: ICD-10-CM

## 2017-07-13 DIAGNOSIS — M79.18 MYOFASCIAL MUSCLE PAIN: ICD-10-CM

## 2017-07-13 DIAGNOSIS — M26.609 TMJ (TEMPOROMANDIBULAR JOINT SYNDROME): ICD-10-CM

## 2017-07-13 PROCEDURE — 97140 MANUAL THERAPY 1/> REGIONS: CPT | Mod: GP | Performed by: PHYSICAL THERAPIST

## 2017-07-13 PROCEDURE — 97112 NEUROMUSCULAR REEDUCATION: CPT | Mod: GP | Performed by: PHYSICAL THERAPIST

## 2017-07-13 NOTE — PROGRESS NOTES
"Subjective:    HPI                    Objective:    System    Physical Exam    General     ROS    Assessment/Plan:      SUBJECTIVE  Subjective changes as noted by pt: Patient reports the jaw has been a lot better. She yawned the other day and felt a \"pop\" and feels that she could open farther for a while but it is tight again.   Current pain level: 0/10   Changes in function:  Yes (See Goal flowsheet attached for changes in current functional level)     Adverse reaction to treatment or activity:  None    OBJECTIVE  Changes in objective findings: AROM opening 38 mm, 50 mm after loud \"pop\", then back to 38 mm.     ASSESSMENT  Cathy continues to require intervention to meet STG and LTG's: PT  Patient is progressing as expected.  Response to therapy has shown an improvement in pain level and ROM   Progress made towards STG/LTG?  Yes (See Goal flowsheet attached for updates on achievement of STG and LTG)    PLAN  Current treatment program is being advanced to more complex exercises.    PTA/ATC plan:  N/A    Please refer to the daily flowsheet for treatment today, total treatment time and time spent performing 1:1 timed codes.              "

## 2017-07-13 NOTE — MR AVS SNAPSHOT
"              After Visit Summary   7/13/2017    Cathy Galloway    MRN: [id number]           Patient Information     Date Of Birth          1995        Visit Information        Provider Department      7/13/2017 12:10 PM Rolly King, PT [facility] [place] [facility]        Today's Diagnoses     TMJ (temporomandibular joint syndrome)        Myofascial muscle pain        Cervicalgia           Follow-ups after your visit        Your next 10 appointments already scheduled     Jul 20, 2017 11:40 AM CDT   [place] Spine with Rolly King PT   [place] [facility] [place] [facility] ([facility] [place])    [address]  [address]  [place] [address] [phone]              Who to contact     If you have questions or need follow up information about today's clinic visit or your schedule please contact [place] [facility] [facility] directly at [phone].  Normal or non-critical lab and imaging results will be communicated to you by MyChart, letter or phone within 4 business days after the clinic has received the results. If you do not hear from us within 7 days, please contact the clinic through Quantaporehart or phone. If you have a critical or abnormal lab result, we will notify you by phone as soon as possible.  Submit refill requests through Americanflat or call your pharmacy and they will forward the refill request to us. Please allow 3 business days for your refill to be completed.          Additional Information About Your Visit        Quantaporehart Information     Americanflat lets you send messages to your doctor, view your test results, renew your prescriptions, schedule appointments and more. To sign up, go to [email or web address] . Click on \"Log in\" on the left side of the screen, which will take you to the Welcome page. Then click on \"Sign up Now\" on the right side of the page.     You will be asked to enter the access code listed below, as well as some personal information. " Please follow the directions to create your username and password.     Your access code is: [email or web address]  Expires: 2017  9:04 PM     Your access code will  in 90 days. If you need help or a new code, please call your [place] clinic or [phone].        Care EveryWhere ID     This is your Care EveryWhere ID. This could be used by other organizations to access your [place] medical records  [id number]         Blood Pressure from Last 3 Encounters:   17 130/81   17 111/78   10/20/16 125/75    Weight from Last 3 Encounters:   17 106.6 kg (235 lb) (>99 %)*   17 107.8 kg (237 lb 9.6 oz) (>99 %)*   10/20/16 111.4 kg (245 lb 8 oz) (>99 %)*     * Growth percentiles are based on [facility] 2-20 Years data.              We Performed the Following     MANUAL THER TECH,1+REGIONS,EA 15 MIN     NEUROMUSCULAR RE-EDUCATION        Primary Care Provider Office Phone # Fax #    Blanche Rojas PA-C [phone]       [facility] [place] [address]  [place] [address]        Equal Access to Services     NOE JEONG : Hadii aad ku hadasho Soomaali, waaxda luqadaha, qaybta kaalmada adeegyada, waxay jorge l haypat blake . So [facility] [phone].    ATENCIÓN: Si habla español, tiene a gore disposición servicios gratuitos de asistencia lingüística. Lldanny al [phone].    We comply with applicable federal civil rights laws and [place] laws. We do not discriminate on the basis of race, color, national origin, age, disability sex, sexual orientation or gender identity.            Thank you!     Thank you for choosing [facility] [place]  for your care. Our goal is always to provide you with excellent care. Hearing back from our patients is one way we can continue to improve our services. Please take a few minutes to complete the written survey that you may receive in the mail after your visit with us. Thank you!             Your Updated Medication List  - Protect others around you: Learn how to safely use, store and throw away your medicines at www.disposemymeds.org.          This list is accurate as of: 7/13/17 12:54 PM.  Always use your most recent med list.                   Brand Name Dispense Instructions for use Diagnosis    diazepam 5 MG tablet    VALIUM    20 tablet    Take 1 tablet (5 mg) by mouth every 12 hours as needed for anxiety or sleep    TMJ (temporomandibular joint syndrome)       ibuprofen 600 MG tablet    ADVIL/MOTRIN    120 tablet    Take 1 tablet by mouth every 6 hours as needed for pain.    Aortic stenosis       IRON SUPPLEMENT PO      Reported on 3/30/2017        levothyroxine 25 MCG tablet    SYNTHROID/LEVOTHROID    90 tablet    Take 1 tablet (25 mcg) by mouth daily    Hypothyroidism due to acquired atrophy of thyroid       norethindrone 0.35 MG per tablet    MICRONOR    90 tablet    Take 1 tablet (0.35 mg) by mouth daily    Dysmenorrhea       predniSONE 10 MG tablet    DELTASONE    42 tablet    Take 60 mg days 1 and 2, 50 mg days 3 and 4, 40 mg days 5 and 6, 30 mg days 7 and 8, 20 mg days 9 and 10, 10 mg days 11 and 12    TMJ (temporomandibular joint syndrome)

## 2017-07-20 ENCOUNTER — THERAPY VISIT (OUTPATIENT)
Dept: PHYSICAL THERAPY | Facility: CLINIC | Age: 22
End: 2017-07-20
Payer: COMMERCIAL

## 2017-07-20 DIAGNOSIS — M79.18 MYOFASCIAL MUSCLE PAIN: ICD-10-CM

## 2017-07-20 DIAGNOSIS — M54.2 CERVICALGIA: ICD-10-CM

## 2017-07-20 DIAGNOSIS — M26.609 TMJ (TEMPOROMANDIBULAR JOINT SYNDROME): ICD-10-CM

## 2017-07-20 PROCEDURE — 97140 MANUAL THERAPY 1/> REGIONS: CPT | Mod: GP | Performed by: PHYSICAL THERAPIST

## 2017-07-20 PROCEDURE — 97110 THERAPEUTIC EXERCISES: CPT | Mod: GP | Performed by: PHYSICAL THERAPIST

## 2017-07-20 NOTE — PROGRESS NOTES
Subjective:    HPI                    Objective:    System    Physical Exam    General     ROS    Assessment/Plan:      SUBJECTIVE  Subjective changes as noted by pt: No headaches this week. In the morning the jaw has been very tight. When doing exercises, it stretches out and feels better. The popping happens a lot during controlled rotation exercise.   Current pain level: 0/10   Changes in function:  Yes (See Goal flowsheet attached for changes in current functional level)     Adverse reaction to treatment or activity:  None    OBJECTIVE  Changes in objective findings:  Yes, opening 40 mm before treatment 42 mm after treatment.         ASSESSMENT  Cathy continues to require intervention to meet STG and LTG's: PT  Patient's symptoms are resolving.  Patient is progressing as expected.  Response to therapy has shown an improvement in  pain level and ROM   Progress made towards STG/LTG?  Yes (See Goal flowsheet attached for updates on achievement of STG and LTG)    PLAN  Current treatment program is being advanced to more complex exercises.    PTA/ATC plan:  N/A    Please refer to the daily flowsheet for treatment today, total treatment time and time spent performing 1:1 timed codes.

## 2017-07-20 NOTE — MR AVS SNAPSHOT
"              After Visit Summary   2017    Cathy Galloway    MRN: [id number]           Patient Information     Date Of Birth          1995        Visit Information        Provider Department      2017 11:40 AM Rolly King PT [place] [facility] [place] [facility]        Today's Diagnoses     TMJ (temporomandibular joint syndrome)        Myofascial muscle pain        Cervicalgia           Follow-ups after your visit        Who to contact     If you have questions or need follow up information about today's clinic visit or your schedule please contact [place] [facility] directly at [phone].  Normal or non-critical lab and imaging results will be communicated to you by [place] [employer]hart, letter or phone within 4 business days after the clinic has received the results. If you do not hear from us within 7 days, please contact the clinic through [place] [employer] or phone. If you have a critical or abnormal lab result, we will notify you by phone as soon as possible.  Submit refill requests through Engagement[employer] or call your pharmacy and they will forward the refill request to us. Please allow 3 business days for your refill to be completed.          Additional Information About Your Visit        MyChart Information     EngagementHealth lets you send messages to your doctor, view your test results, renew your prescriptions, schedule appointments and more. To sign up, go to [email or web address] . Click on \"Log in\" on the left side of the screen, which will take you to the Welcome page. Then click on \"Sign up Now\" on the right side of the page.     You will be asked to enter the access code listed below, as well as some personal information. Please follow the directions to create your username and password.     Your access code is: [email or web address]  Expires: 2017  9:04 PM     Your access code will  in 90 days. If you need help or a new code, please call your [place] clinic or [phone].   "      Care EveryWhere ID     This is your Care EveryWhere ID. This could be used by other organizations to access your [place] medical records  [id number]         Blood Pressure from Last 3 Encounters:   06/08/17 130/81   03/30/17 111/78   10/20/16 125/75    Weight from Last 3 Encounters:   06/08/17 106.6 kg (235 lb) (>99 %)*   03/30/17 107.8 kg (237 lb 9.6 oz) (>99 %)*   10/20/16 111.4 kg (245 lb 8 oz) (>99 %)*     * Growth percentiles are based on [facility] 2-20 Years data.              We Performed the Following     MANUAL THER TECH,1+REGIONS,EA 15 MIN     THERAPEUTIC EXERCISES        Primary Care Provider Office Phone # Fax #    Blanche Rojas PA-C [phone]       [facility] [place] [address]  [place] [address]        Equal Access to Services     TOVA JEONG : Hadii aad ku hadasho Soomaali, waaxda luqadaha, qaybta kaalmada adeegyada, waxay idiin hayaan eliana khdamian blake . So [facility] [phone].    ATENCIÓN: Si desean morales, tiene a gore disposición servicios gratuitos de asistencia lingüística. Magdalenadanny al [phone].    We comply with applicable federal civil rights laws and [place] laws. We do not discriminate on the basis of race, color, national origin, age, disability sex, sexual orientation or gender identity.            Thank you!     Thank you for choosing [facility]  for your care. Our goal is always to provide you with excellent care. Hearing back from our patients is one way we can continue to improve our services. Please take a few minutes to complete the written survey that you may receive in the mail after your visit with us. Thank you!             Your Updated Medication List - Protect others around you: Learn how to safely use, store and throw away your medicines at www.disposemymeds.org.          This list is accurate as of: 7/20/17 12:19 PM.  Always use your most recent med list.                   Brand Name Dispense Instructions for  use Diagnosis    diazepam 5 MG tablet    VALIUM    20 tablet    Take 1 tablet (5 mg) by mouth every 12 hours as needed for anxiety or sleep    TMJ (temporomandibular joint syndrome)       ibuprofen 600 MG tablet    ADVIL/MOTRIN    120 tablet    Take 1 tablet by mouth every 6 hours as needed for pain.    Aortic stenosis       IRON SUPPLEMENT PO      Reported on 3/30/2017        levothyroxine 25 MCG tablet    SYNTHROID/LEVOTHROID    90 tablet    Take 1 tablet (25 mcg) by mouth daily    Hypothyroidism due to acquired atrophy of thyroid       norethindrone 0.35 MG per tablet    MICRONOR    90 tablet    Take 1 tablet (0.35 mg) by mouth daily    Dysmenorrhea       predniSONE 10 MG tablet    DELTASONE    42 tablet    Take 60 mg days 1 and 2, 50 mg days 3 and 4, 40 mg days 5 and 6, 30 mg days 7 and 8, 20 mg days 9 and 10, 10 mg days 11 and 12    TMJ (temporomandibular joint syndrome)

## 2017-08-01 ENCOUNTER — THERAPY VISIT (OUTPATIENT)
Dept: PHYSICAL THERAPY | Facility: CLINIC | Age: 22
End: 2017-08-01
Payer: COMMERCIAL

## 2017-08-01 DIAGNOSIS — M26.609 TMJ (TEMPOROMANDIBULAR JOINT SYNDROME): ICD-10-CM

## 2017-08-01 DIAGNOSIS — M79.18 MYOFASCIAL MUSCLE PAIN: ICD-10-CM

## 2017-08-01 DIAGNOSIS — M54.2 CERVICALGIA: ICD-10-CM

## 2017-08-01 PROCEDURE — 97110 THERAPEUTIC EXERCISES: CPT | Mod: GP | Performed by: PHYSICAL THERAPIST

## 2017-08-01 PROCEDURE — 97112 NEUROMUSCULAR REEDUCATION: CPT | Mod: GP | Performed by: PHYSICAL THERAPIST

## 2017-08-01 PROCEDURE — 97140 MANUAL THERAPY 1/> REGIONS: CPT | Mod: GP | Performed by: PHYSICAL THERAPIST

## 2017-08-01 NOTE — PROGRESS NOTES
Subjective:    HPI                    Objective:    System    Physical Exam    General     ROS    Assessment/Plan:      SUBJECTIVE  Subjective changes as noted by pt: Patient reports that the right side of the jaw is sore with the new exercise - the discomfort resolves after the exercise. Overall the left side of the jaw feels a lot looser. There is some pain with harder foods on the left still. No pain at rest or with other activities. She did have a frontal headache this week that lasted about 3 days but she feels this may have been stress/work related.  Current pain level: 0/10 (4/10 with harder foods)   Changes in function:  Yes (See Goal flowsheet attached for changes in current functional level)     Adverse reaction to treatment or activity:  None    OBJECTIVE  Changes in objective findings:  Yes, opening 40 mm/55 mm after click before treatment; end of treatment 44 mm no click.         ASSESSMENT  Cathy continues to require intervention to meet STG and LTG's: PT  Patient is progressing as expected.  Response to therapy has shown an improvement in  pain level and ROM   Progress made towards STG/LTG?  Yes (See Goal flowsheet attached for updates on achievement of STG and LTG)    PLAN  Current treatment program is being advanced to more complex exercises.    PTA/ATC plan:  N/A    Please refer to the daily flowsheet for treatment today, total treatment time and time spent performing 1:1 timed codes.

## 2017-08-01 NOTE — MR AVS SNAPSHOT
"              After Visit Summary   8/1/2017    Cathy Galloway    MRN: [id number]           Patient Information     Date Of Birth          1995        Visit Information        Provider Department      8/1/2017 10:10 AM Rolly King, PT [facility] [place] [facility]        Today's Diagnoses     TMJ (temporomandibular joint syndrome)        Myofascial muscle pain        Cervicalgia           Follow-ups after your visit        Your next 10 appointments already scheduled     Aug 14, 2017 11:20 AM CDT   [place] Spine with Rolly King PT   [place] [facility] [place] [facility] ([facility] [place])    [address]  [address]  [place] [address] [phone]              Who to contact     If you have questions or need follow up information about today's clinic visit or your schedule please contact [place] [facility] directly at [phone].  Normal or non-critical lab and imaging results will be communicated to you by MyChart, letter or phone within 4 business days after the clinic has received the results. If you do not hear from us within 7 days, please contact the clinic through abcdexpertshart or phone. If you have a critical or abnormal lab result, we will notify you by phone as soon as possible.  Submit refill requests through MAYKOR or call your pharmacy and they will forward the refill request to us. Please allow 3 business days for your refill to be completed.          Additional Information About Your Visit        abcdexpertshart Information     MAYKOR lets you send messages to your doctor, view your test results, renew your prescriptions, schedule appointments and more. To sign up, go to [email or web address] . Click on \"Log in\" on the left side of the screen, which will take you to the Welcome page. Then click on \"Sign up Now\" on the right side of the page.     You will be asked to enter the access code listed below, as well as some personal information. " Please follow the directions to create your username and password.     Your access code is: [email or web address]  Expires: 2017  9:04 PM     Your access code will  in 90 days. If you need help or a new code, please call your [place] clinic or [phone].        Care EveryWhere ID     This is your Care EveryWhere ID. This could be used by other organizations to access your [place] medical records  [id number]         Blood Pressure from Last 3 Encounters:   17 130/81   17 111/78   10/20/16 125/75    Weight from Last 3 Encounters:   17 106.6 kg (235 lb) (>99 %)*   17 107.8 kg (237 lb 9.6 oz) (>99 %)*   10/20/16 111.4 kg (245 lb 8 oz) (>99 %)*     * Growth percentiles are based on [facility] 2-20 Years data.              We Performed the Following     MANUAL THER TECH,1+REGIONS,EA 15 MIN     NEUROMUSCULAR RE-EDUCATION     THERAPEUTIC EXERCISES        Primary Care Provider Office Phone # Fax #    Blanche Rojas PA-C [phone]       [facility] [place] [address]  [place] [address]        Equal Access to Services     TOVA JEONG : Hadii aad ku hadasho Soomaali, waaxda luqadaha, qaybta kaalmada adeegyada, waxay jorge l haypat blake . So [facility] [phone].    ATENCIÓN: Si habla español, tiene a gore disposición servicios gratuitos de asistencia lingüística. [facility] [phone].    We comply with applicable federal civil rights laws and [place] laws. We do not discriminate on the basis of race, color, national origin, age, disability sex, sexual orientation or gender identity.            Thank you!     Thank you for choosing [facility] [place]  for your care. Our goal is always to provide you with excellent care. Hearing back from our patients is one way we can continue to improve our services. Please take a few minutes to complete the written survey that you may receive in the mail after your visit with us. Thank you!              Your Updated Medication List - Protect others around you: Learn how to safely use, store and throw away your medicines at www.disposemymeds.org.          This list is accurate as of: 8/1/17 10:44 AM.  Always use your most recent med list.                   Brand Name Dispense Instructions for use Diagnosis    diazepam 5 MG tablet    VALIUM    20 tablet    Take 1 tablet (5 mg) by mouth every 12 hours as needed for anxiety or sleep    TMJ (temporomandibular joint syndrome)       ibuprofen 600 MG tablet    ADVIL/MOTRIN    120 tablet    Take 1 tablet by mouth every 6 hours as needed for pain.    Aortic stenosis       IRON SUPPLEMENT PO      Reported on 3/30/2017        levothyroxine 25 MCG tablet    SYNTHROID/LEVOTHROID    90 tablet    Take 1 tablet (25 mcg) by mouth daily    Hypothyroidism due to acquired atrophy of thyroid       norethindrone 0.35 MG per tablet    MICRONOR    90 tablet    Take 1 tablet (0.35 mg) by mouth daily    Dysmenorrhea       predniSONE 10 MG tablet    DELTASONE    42 tablet    Take 60 mg days 1 and 2, 50 mg days 3 and 4, 40 mg days 5 and 6, 30 mg days 7 and 8, 20 mg days 9 and 10, 10 mg days 11 and 12    TMJ (temporomandibular joint syndrome)

## 2017-08-10 ENCOUNTER — OFFICE VISIT (OUTPATIENT)
Dept: FAMILY MEDICINE | Facility: CLINIC | Age: 22
End: 2017-08-10
Payer: COMMERCIAL

## 2017-08-10 VITALS
HEIGHT: 65 IN | WEIGHT: 235 LBS | DIASTOLIC BLOOD PRESSURE: 80 MMHG | BODY MASS INDEX: 39.15 KG/M2 | OXYGEN SATURATION: 98 % | HEART RATE: 96 BPM | SYSTOLIC BLOOD PRESSURE: 110 MMHG

## 2017-08-10 DIAGNOSIS — F41.1 GAD (GENERALIZED ANXIETY DISORDER): Primary | ICD-10-CM

## 2017-08-10 DIAGNOSIS — F34.1 DYSTHYMIA: ICD-10-CM

## 2017-08-10 PROCEDURE — 99214 OFFICE O/P EST MOD 30 MIN: CPT | Performed by: PHYSICIAN ASSISTANT

## 2017-08-10 RX ORDER — ESCITALOPRAM OXALATE 10 MG/1
TABLET ORAL
Qty: 30 TABLET | Refills: 1 | Status: SHIPPED | OUTPATIENT
Start: 2017-08-10 | End: 2017-10-12

## 2017-08-10 RX ORDER — LORAZEPAM 0.5 MG/1
.25-.5 TABLET ORAL EVERY 8 HOURS PRN
Qty: 25 TABLET | Refills: 0 | Status: SHIPPED | OUTPATIENT
Start: 2017-08-10 | End: 2017-10-12

## 2017-08-10 NOTE — MR AVS SNAPSHOT
After Visit Summary   8/10/2017    Cathy Galloway    MRN: [id number]           Patient Information     Date Of Birth          1995        Visit Information        Provider Department      8/10/2017 11:00 AM Vijay Hammonds PA-C [facility]        Today's Diagnoses     THANG (generalized anxiety disorder)    -  1    Dysthymia           Follow-ups after your visit        Additional Services     MENTAL HEALTH REFERRAL       Your provider has referred you to: FMG: [facility] - Counseling (Individual/Couples/Family) - [facility] [place] [phone]   [email or web address]   *Patient will be contacted by [facility] scheduling partner, [facility]), to schedule an appointment.  Patients may also call [facility] to schedule.    All scheduling is subject to the client's specific insurance plan & benefits, provider/location availability, and provider clinical specialities.  Please arrive 15 minutes early for your first appointment and bring your completed paperwork.    Please be aware that coverage of these services is subject to the terms and limitations of your health insurance plan.  Call member services at your health plan with any benefit or coverage questions.                  Your next 10 appointments already scheduled     Aug 14, 2017 11:20 AM CDT   DESTINEE Spine with Rolly King PT   [facility] [place] [facility] (DESTIN[facility] Junior)    [address]  [place] [address] [phone]              Who to contact     Normal or non-critical lab and imaging results will be communicated to you by MyChart, letter or phone within 4 business days after the clinic has received the results. If you do not hear from us within 7 days, please contact the clinic through MyChart or phone. If you have a critical or abnormal lab result, we will notify you by phone as soon as  "possible.  Submit refill requests through RoundPegg or call your pharmacy and they will forward the refill request to us. Please allow 3 business days for your refill to be completed.          If you need to speak with a  for additional information , please call: [phone]             Additional Information About Your Visit        RoundPegg Information     RoundPegg lets you send messages to your doctor, view your test results, renew your prescriptions, schedule appointments and more. To sign up, go to [email or web address] . Click on \"Log in\" on the left side of the screen, which will take you to the Welcome page. Then click on \"Sign up Now\" on the right side of the page.     You will be asked to enter the access code listed below, as well as some personal information. Please follow the directions to create your username and password.     Your access code is: [email or web address]  Expires: 2017  9:04 PM     Your access code will  in 90 days. If you need help or a new code, please call your [place] clinic or [phone].        Care EveryWhere ID     This is your Care EveryWhere ID. This could be used by other organizations to access your [place] medical records  [id number]        Your Vitals Were     Pulse Height Pulse Oximetry BMI (Body Mass Index)          96 5' 5\" (1.651 m) 98% 39.11 kg/m2         Blood Pressure from Last 3 Encounters:   08/10/17 110/80   17 130/81   17 111/78    Weight from Last 3 Encounters:   08/10/17 235 lb (106.6 kg)   17 235 lb (106.6 kg) (>99 %)*   17 237 lb 9.6 oz (107.8 kg) (>99 %)*     * Growth percentiles are based on CDC 2-20 Years data.              We Performed the Following     MENTAL HEALTH REFERRAL          Today's Medication Changes          These changes are accurate as of: 8/10/17 11:52 AM.  If you have any questions, ask your nurse or doctor.               Start taking these medicines.        Dose/Directions    escitalopram 10 " MG tablet   Commonly known as:  LEXAPRO   Used for:  THANG (generalized anxiety disorder), Dysthymia   Started by:  Vijay Hammonds PA-C        Take 1/2 tab daily for 1 week, then increase to 1 full tab daily thereafter   Quantity:  30 tablet   Refills:  1       LORazepam 0.5 MG tablet   Commonly known as:  ATIVAN   Used for:  THANG (generalized anxiety disorder)   Started by:  Vijay Hammonds PA-C        Dose:  0.25-0.5 mg   Take 0.5-1 tablets (0.25-0.5 mg) by mouth every 8 hours as needed for anxiety   Quantity:  25 tablet   Refills:  0         Stop taking these medicines if you haven't already. Please contact your care team if you have questions.     diazepam 5 MG tablet   Commonly known as:  VALIUM   Stopped by:  Vijay Hammonds PA-C           predniSONE 10 MG tablet   Commonly known as:  DELTASONE   Stopped by:  Vijay Hammonds PA-C                Where to get your medicines      These medications were sent to [facility] - [address]  [address] [address] [address]     Phone:  [phone]     escitalopram 10 MG tablet         Some of these will need a paper prescription and others can be bought over the counter.  Ask your nurse if you have questions.     Bring a paper prescription for each of these medications     LORazepam 0.5 MG tablet                Primary Care Provider Office Phone # Fax #    Blanche Rojas PA-C [phone]       [address]  [place] [address]        Equal Access to Services     [facility] AH: Hadii aad ku hadasho Soomaali, waaxda luqadaha, qaybta kaalmada adeegyada, javon coats haypat blake . So [facility] [phone].    ATENCIÓN: Si habla español, tiene a gore disposición servicios gratuitos de asistencia lingüística. Llame al [phone].    We comply with applicable federal civil rights laws and [place] laws. We do not discriminate on the basis of race, color, national origin, age,  disability sex, sexual orientation or gender identity.            Thank you!     Thank you for choosing [facility]  for your care. Our goal is always to provide you with excellent care. Hearing back from our patients is one way we can continue to improve our services. Please take a few minutes to complete the written survey that you may receive in the mail after your visit with us. Thank you!             Your Updated Medication List - Protect others around you: Learn how to safely use, store and throw away your medicines at www.disposemymeds.org.          This list is accurate as of: 8/10/17 11:52 AM.  Always use your most recent med list.                   Brand Name Dispense Instructions for use Diagnosis    escitalopram 10 MG tablet    LEXAPRO    30 tablet    Take 1/2 tab daily for 1 week, then increase to 1 full tab daily thereafter    THANG (generalized anxiety disorder), Dysthymia       ibuprofen 600 MG tablet    ADVIL/MOTRIN    120 tablet    Take 1 tablet by mouth every 6 hours as needed for pain.    Aortic stenosis       IRON SUPPLEMENT PO      Reported on 3/30/2017        levothyroxine 25 MCG tablet    SYNTHROID/LEVOTHROID    90 tablet    Take 1 tablet (25 mcg) by mouth daily    Hypothyroidism due to acquired atrophy of thyroid       LORazepam 0.5 MG tablet    ATIVAN    25 tablet    Take 0.5-1 tablets (0.25-0.5 mg) by mouth every 8 hours as needed for anxiety    THANG (generalized anxiety disorder)       norethindrone 0.35 MG per tablet    MICRONOR    90 tablet    Take 1 tablet (0.35 mg) by mouth daily    Dysmenorrhea

## 2017-08-10 NOTE — PROGRESS NOTES
SUBJECTIVE:                                                    Cathy Galloway is a 22 year old female who presents to clinic today for the following health issues:      Anxiety Follow-Up    Status since last visit:      Other associated symptoms:social anxiety    Complicating factors:   Significant life event: No   Current substance abuse: None  Depression symptoms: No  No flowsheet data found.    GAD7                Amount of exercise or physical activity: None    Problems taking medications regularly: No    Medication side effects: none  Diet: regular (no restrictions)          Problem list and histories reviewed & adjusted, as indicated.  Additional history: as documented        [facility]       Reviewed and updated as needed this visit by Provider         All other systems negative except as outline above  OBJECTIVE:  Eye exam - right eye normal lid, conjunctiva, cornea, pupil and fundus, left eye normal lid, conjunctiva, cornea, pupil and fundus.  Thyroid not palpable, not enlarged, no nodules detected.  CHEST:chest clear to IPPA, no tachypnea, retractions or cyanosis and S1, S2 normal, no murmur, no gallop, rate regular.    Cathy was seen today for anxiety.    Diagnoses and all orders for this visit:    THANG (generalized anxiety disorder)  -     escitalopram (LEXAPRO) 10 MG tablet; Take 1/2 tab daily for 1 week, then increase to 1 full tab daily thereafter  -     MENTAL HEALTH REFERRAL  -     LORazepam (ATIVAN) 0.5 MG tablet; Take 0.5-1 tablets (0.25-0.5 mg) by mouth every 8 hours as needed for anxiety    Dysthymia  -     escitalopram (LEXAPRO) 10 MG tablet; Take 1/2 tab daily for 1 week, then increase to 1 full tab daily thereafter  -     MENTAL HEALTH REFERRAL      work on lifestyle modification  Recheck in 1 mos.

## 2017-08-14 ENCOUNTER — THERAPY VISIT (OUTPATIENT)
Dept: PHYSICAL THERAPY | Facility: CLINIC | Age: 22
End: 2017-08-14
Payer: COMMERCIAL

## 2017-08-14 DIAGNOSIS — M26.609 TMJ (TEMPOROMANDIBULAR JOINT SYNDROME): ICD-10-CM

## 2017-08-14 DIAGNOSIS — M79.18 MYOFASCIAL MUSCLE PAIN: ICD-10-CM

## 2017-08-14 DIAGNOSIS — M54.2 CERVICALGIA: ICD-10-CM

## 2017-08-14 PROCEDURE — 97140 MANUAL THERAPY 1/> REGIONS: CPT | Mod: GP | Performed by: PHYSICAL THERAPIST

## 2017-08-14 NOTE — PROGRESS NOTES
Subjective:    HPI                    Objective:    System    Physical Exam    General     ROS    Assessment/Plan:      PROGRESS  REPORT    Progress reporting period is from 6/29/17 to 8/14/17.       SUBJECTIVE  Subjective changes noted by patient: Patient returns after a 2 week planned hiatus from PT. Patient reports that the jaw feels tighter than last visit. There is some soreness in the jaw after eating steak the other day. In fact, it has been feeling more sore for 1-1.5 weeks.    Current pain level is 0/10 (no pain at rest; 5/10 with talking/chewing).     Previous pain level was 6/10.   Changes in function:  None  Adverse reaction to treatment or activity: None    OBJECTIVE  Changes noted in objective findings: : Opening 44 mm beginning of treatment, same at end of treatment. Laterotrusion 12 mm bilaterally. Improving joint mechanics but continues to have a loud click on the left and ttp left>right masseter and ptyergoid.     ASSESSMENT/PLAN  Updated problem list and treatment plan: Diagnosis 1: TMD - disc displacement without reduction and myofascial pain      Pain -  hot/cold therapy, manual therapy, education and home program  Decreased ROM/flexibility - manual therapy, therapeutic exercise and home program  Decreased joint mobility - manual therapy, therapeutic exercise and home program  Decreased strength - therapeutic exercise, therapeutic activities and home program  Impaired muscle performance - neuro re-education and home program  Decreased function - therapeutic activities and home program  STG/LTGs have been met or progress has been made towards goals:  Yes but recent exacerbation  Assessment of Progress: The patient's condition was improving but has recently been exacerbated.  Self Management Plans:  Patient has been instructed in a home treatment program.  I have re-evaluated this patient and find that the nature, scope, duration and intensity of the therapy is appropriate for the medical  condition of the patient.  Cathy continues to require the following intervention to meet STG and LTG's:  PT    Recommendations:  This patient would benefit from continued therapy.     Frequency:  1 X week, once daily  Duration:  for 4-6 weeks          Please refer to the daily flowsheet for treatment today, total treatment time and time spent performing 1:1 timed codes.

## 2017-08-14 NOTE — MR AVS SNAPSHOT
After Visit Summary   8/14/2017    Cathy Galloway    MRN: [id number]           Patient Information     Date Of Birth          1995        Visit Information        Provider Department      8/14/2017 11:20 AM Rolly King, PT [facility] [place] [facility]        Today's Diagnoses     TMJ (temporomandibular joint syndrome)        Myofascial muscle pain        Cervicalgia           Follow-ups after your visit        Your next 10 appointments already scheduled     Aug 23, 2017  7:00 AM CDT   [facility] Spine with Rolly King PT   [facility] [place] [facility] ([facility] [place])    [address]  [place] [address]   [phone]            Aug 29, 2017  9:30 AM CDT   DESTIN[facility] Spine with Rolly King PT   [facility] [place] [facility] ([facility] [place])    [address]  [place] [address]   [phone]            Sep 05, 2017  9:30 AM CDT   DESTINEE Spine with Rolly King PT   [facility] [place] [facility] ([facility] [place])    [address]  [place] [address]   [phone]            Sep 12, 2017 10:00 AM CDT   DESTINEE Spine with Rolly King PT   [facility] [place] [facility] ([facility] [place])    [address]  [place] [address]   [phone]              Who to contact     If you have questions or need follow up information about today's clinic visit or your schedule please contact [facility] [place] [facility] directly at [phone].  Normal or non-critical lab and imaging results will be communicated to you by MyChart, letter or phone within 4 business days after the clinic has received the results. If you do not hear from us within 7 days, please contact the clinic through MyChart or phone. If you have a critical or abnormal lab result, we will notify you by phone as soon as possible.  Submit refill requests through AppTapt or  "call your pharmacy and they will forward the refill request to us. Please allow 3 business days for your refill to be completed.          Additional Information About Your Visit        MyChart Information     [email or web address] lets you send messages to your doctor, view your test results, renew your prescriptions, schedule appointments and more. To sign up, go to [email or web address] . Click on \"Log in\" on the left side of the screen, which will take you to the Welcome page. Then click on \"Sign up Now\" on the right side of the page.     You will be asked to enter the access code listed below, as well as some personal information. Please follow the directions to create your username and password.     Your access code is: [email or web address]  Expires: 2017  9:04 PM     Your access code will  in 90 days. If you need help or a new code, please call your [place] clinic or [phone].        Care EveryWhere ID     This is your Care EveryWhere ID. This could be used by other organizations to access your [place] medical records  [id number]         Blood Pressure from Last 3 Encounters:   08/10/17 110/80   17 130/81   17 111/78    Weight from Last 3 Encounters:   08/10/17 106.6 kg (235 lb)   17 106.6 kg (235 lb) (>99 %)*   17 107.8 kg (237 lb 9.6 oz) (>99 %)*     * Growth percentiles are based on CDC 2-20 Years data.              We Performed the Following     [facility] PROGRESS NOTES REPORT     MANUAL THER TECH,1+REGIONS,EA 15 MIN        Primary Care Provider Office Phone # Fax #    Blanche Rojas PA-C [phone]       [address] [place] [address]        Equal Access to Services     [facility] JEAN-PAUL : Hadюлия barneyo Jeffy, waaxda luqadaha, qaybta kaalmada adefabianyasruthi, javon [place]. [facility] [phone].    ATENCIÓN: Si habla español, tiene a gore disposición servicios gratuitos de asistencia lingüística. Llame al [phone].    We comply with " applicable federal civil rights laws and [place] laws. We do not discriminate on the basis of race, color, national origin, age, disability sex, sexual orientation or gender identity.            Thank you!     Thank you for choosing [facility]  for your care. Our goal is always to provide you with excellent care. Hearing back from our patients is one way we can continue to improve our services. Please take a few minutes to complete the written survey that you may receive in the mail after your visit with us. Thank you!             Your Updated Medication List - Protect others around you: Learn how to safely use, store and throw away your medicines at www.disposemymeds.org.          This list is accurate as of: 8/14/17 12:45 PM.  Always use your most recent med list.                   Brand Name Dispense Instructions for use Diagnosis    escitalopram 10 MG tablet    LEXAPRO    30 tablet    Take 1/2 tab daily for 1 week, then increase to 1 full tab daily thereafter    THANG (generalized anxiety disorder), Dysthymia       ibuprofen 600 MG tablet    ADVIL/MOTRIN    120 tablet    Take 1 tablet by mouth every 6 hours as needed for pain.    Aortic stenosis       IRON SUPPLEMENT PO      Reported on 3/30/2017        levothyroxine 25 MCG tablet    SYNTHROID/LEVOTHROID    90 tablet    Take 1 tablet (25 mcg) by mouth daily    Hypothyroidism due to acquired atrophy of thyroid       LORazepam 0.5 MG tablet    ATIVAN    25 tablet    Take 0.5-1 tablets (0.25-0.5 mg) by mouth every 8 hours as needed for anxiety    THANG (generalized anxiety disorder)       norethindrone 0.35 MG per tablet    MICRONOR    90 tablet    Take 1 tablet (0.35 mg) by mouth daily    Dysmenorrhea

## 2017-08-24 ENCOUNTER — OFFICE VISIT (OUTPATIENT)
Dept: PSYCHOLOGY | Facility: CLINIC | Age: 22
End: 2017-08-24
Attending: PHYSICIAN ASSISTANT
Payer: COMMERCIAL

## 2017-08-24 DIAGNOSIS — F41.1 GENERALIZED ANXIETY DISORDER: Primary | ICD-10-CM

## 2017-08-24 DIAGNOSIS — F41.0 PANIC DISORDER: ICD-10-CM

## 2017-08-24 DIAGNOSIS — F32.89 OTHER SPECIFIED DEPRESSIVE EPISODES: ICD-10-CM

## 2017-08-24 PROCEDURE — 90791 PSYCH DIAGNOSTIC EVALUATION: CPT | Performed by: COUNSELOR

## 2017-08-24 ASSESSMENT — ANXIETY QUESTIONNAIRES
3. WORRYING TOO MUCH ABOUT DIFFERENT THINGS: NEARLY EVERY DAY
GAD7 TOTAL SCORE: 19
IF YOU CHECKED OFF ANY PROBLEMS ON THIS QUESTIONNAIRE, HOW DIFFICULT HAVE THESE PROBLEMS MADE IT FOR YOU TO DO YOUR WORK, TAKE CARE OF THINGS AT HOME, OR GET ALONG WITH OTHER PEOPLE: SOMEWHAT DIFFICULT
2. NOT BEING ABLE TO STOP OR CONTROL WORRYING: NEARLY EVERY DAY
5. BEING SO RESTLESS THAT IT IS HARD TO SIT STILL: MORE THAN HALF THE DAYS
6. BECOMING EASILY ANNOYED OR IRRITABLE: MORE THAN HALF THE DAYS
1. FEELING NERVOUS, ANXIOUS, OR ON EDGE: NEARLY EVERY DAY
7. FEELING AFRAID AS IF SOMETHING AWFUL MIGHT HAPPEN: NEARLY EVERY DAY

## 2017-08-24 ASSESSMENT — PATIENT HEALTH QUESTIONNAIRE - PHQ9
SUM OF ALL RESPONSES TO PHQ QUESTIONS 1-9: 15
5. POOR APPETITE OR OVEREATING: NEARLY EVERY DAY

## 2017-08-24 NOTE — MR AVS SNAPSHOT
"                  MRN:[id number]                      After Visit Summary   2017    Cathy Galloway    MRN: [id number]           Visit Information        Provider Department      2017 3:00 PM Lilibeth Cedeño, UYEN [facility]      Your next 10 appointments already scheduled     Aug 29, 2017  9:30 AM CDT   [facility] Spine with Rolly King, PT   [facility] [place] [facility] ([facility] [place])    [address]  [place] [address]   [phone]            Aug 31, 2017  3:00 PM CDT   Return Visit with Lilibeth Cedeño, UYEN   [facility] ([facility])    [address] [address]   [phone]            Sep 05, 2017  9:30 AM CDT   DESTIN[facility] Spine with Rolly King, PT   [facility] [place] [facility] ([facility] [place])    [address]  [place] [address]   [phone]            Sep 12, 2017 10:00 AM CDT   DESTINEE Spine with Rolly King, PT   [facility] [place] [facility] ([facility] [place])    [address]  [place] [address]   [phone]              MyChart Information     [employer] lets you send messages to your doctor, view your test results, renew your prescriptions, schedule appointments and more. To sign up, go to [email or web address] . Click on \"Log in\" on the left side of the screen, which will take you to the Welcome page. Then click on \"Sign up Now\" on the right side of the page.     You will be asked to enter the access code listed below, as well as some personal information. Please follow the directions to create your username and password.     Your access code is: [email or web address]  Expires: 2017  9:04 PM     Your access code will  in 90 days. If you need help or a new code, please call your [place] clinic or [phone].        Care EveryWhere ID     This is your Care EveryWhere ID. This could be used " by other organizations to access your [place] medical records  [id number]        Equal Access to Services     NOE JEONG : Rosibel Bardales, doris martin, javon couch. So [facility] [phone].    ATENCIÓN: Si habla español, tiene a gore disposición servicios gratuitos de asistencia lingüística. Llame al [phone].    We comply with applicable federal civil rights laws and [place] laws. We do not discriminate on the basis of race, color, national origin, age, disability sex, sexual orientation or gender identity.

## 2017-08-24 NOTE — PROGRESS NOTES
Adult Intake Structured Interview  Standard Diagnostic Assessment      CLIENT'S NAME: Cathy Galloway  MRN:   [id number]  :   1995  ACCT. NUMBER: [id number]  DATE OF SERVICE: 17      Identifying Information:  Client is a 22 year old, , partnered / significant other female. Client was referred for counseling by Vijay Hammonds at [facility]. Client is currently employed part time and also a student at [employer]. Client attended the session alone. Client resides in [place], [place] with her mother, father, and younger brother. She is employed part-time at [employer].      Client's Statement of Presenting Concern:  Client reports the reason for seeking therapy at this time as increased anxiety and panic attacks.  Client stated that her symptoms have resulted in the following functional impairments: home life with her parents, social interactions and work / vocational responsibilities      History of Presenting Concern:  Client reports that these problem(s) began when she was a young child, and she has been noticing increased symptoms since her early teenage years. Client has attempted to resolve these concerns in the past through talking with pediatricians, but was uncomfortable about some of the recommendations that the doctors provided for her at that time. Client reports that other professional(s) are not involved in providing support / services. Cathy reported that she has struggled with anxiety and panic symptoms for a while, and when professionals told her that she would benefit from group therapy, she would shy away from the idea. She was prescribed Lexapro and Atavan to address her symptoms.      Social History:  Client reported she grew up in [place] [facility] and [facility]  [place]. They were the first born of two children. This is an intact family and parents remain . Client reported that her childhood was good, although her parents often struggled to fully understand her symptoms and the impact her symptoms had on her. Client described her current relationships with family of origin as mostly healthy, although they argue from time to time.    Client reported a history of three committed relationships. Client is currently partnered / significant other. Client reported having no children. Client identified few stable and meaningful social connections. Client reported that she has not been involved with the legal system. Client's highest education level was high school graduate and some college. Client did not identify any learning problems. There are no ethnic, cultural or Roman Catholic factors that may be relevant for therapy. Client identified her preferred language to be English. Client reported she does not need the assistance of an  or other support involved in therapy. Modifications will not be used to assist communication in therapy. Client did not serve in the .     Client reports family history includes Cardiovascular in her brother, maternal aunt, maternal grandmother, and paternal grandmother; HEART DISEASE in her brother, maternal aunt, maternal grandmother, and paternal grandmother; Hypertension in her father; Lipids in her paternal grandmother; Musculoskeletal Disorder in her mother. There is no history of CANCER, DIABETES, CEREBROVASCULAR DISEASE, Thyroid Disease, Glaucoma, or Macular Degeneration.    Mental Health History:  Client reported the following biological family members or relatives with mental health issues: Aunt experienced Depression and grandmother experienced Depression.  Client previously received the following mental health diagnosis: Anxiety.  Client has received the following mental health services in the past: counseling.   Hospitalizations: None.  Client is not currently receiving any mental health services.  Cathy reported that she used to engage in self-injurious behaviors, but was not actively engaging in them. She also indicated thoughts of suicide in the past, but no current suicidal ideation.    Chemical Health History:  Client reported the following biological family members or relatives with chemical health issues: Mother reportedly uses alcohol . Client has not received chemical dependency treatment in the past. Client is not currently receiving any chemical dependency treatment. Client reports no problems as a result of their drinking / drug use.    Client Reports:  Client reports using alcohol 3 times per week and has 5 shots at a time. Client first started drinking at age 22.  Client denies using tobacco.  Client denies using marijuana.  Client reports using caffeine 2 times per day and drinks 1 at a time. Client started using caffeine at age 19.  Client denies using street drugs.  Client denies the non-medical use of prescription or over the counter drugs.    CAGE: None of the patient's responses to the CAGE screening were positive / Negative CAGE score   Based on the negative Cage-Aid score and clinical interview there  are not indications of drug or alcohol abuse.    Discussed the general effects of drugs and alcohol on health and well-being. Therapist gave client printed information about the effects of chemical use on her health and well being.      Significant Losses / Trauma / Abuse / Neglect Issues:  There are no indications or report of: significant losses, trauma, abuse or neglect.    Issues of possible neglect are not present.      Medical Issues:  Client has had a physical exam to rule out medical causes for current symptoms. Date of last physical exam was within the past year. Client was encouraged to follow up with PCP if symptoms were to develop. The client has a [facility] Primary Care Provider, who is  named Vijay Hammonds. The client reports not having a psychiatrist. Client reports the following current medical concerns: heart defect that she had open heart surgery for when she was 17 years old. The client reports the presence of chronic or episodic pain in the form of tightness in her chest. The pain level is mild and has a frequency of once in a while.. She reported that elizabeth are nutritional concerns regarding her weight according to her cardiologist.    Client reports current meds as:   Current Outpatient Prescriptions   Medication Sig     escitalopram (LEXAPRO) 10 MG tablet Take 1/2 tab daily for 1 week, then increase to 1 full tab daily thereafter     LORazepam (ATIVAN) 0.5 MG tablet Take 0.5-1 tablets (0.25-0.5 mg) by mouth every 8 hours as needed for anxiety     norethindrone (MICRONOR) 0.35 MG per tablet Take 1 tablet (0.35 mg) by mouth daily     levothyroxine (SYNTHROID/LEVOTHROID) 25 MCG tablet Take 1 tablet (25 mcg) by mouth daily     Ferrous Sulfate (IRON SUPPLEMENT PO) Reported on 3/30/2017     ibuprofen (ADVIL,MOTRIN) 600 MG tablet Take 1 tablet by mouth every 6 hours as needed for pain.     No current facility-administered medications for this visit.        Client Allergies:  No Known Allergies  no allergies to medications    Medical History:  Past Medical History:   Diagnosis Date     Aortic valve disorder      H/O bacterial endocarditis      Myopia      Obesity          Medication Adherence:  Client reports taking prescribed medications as prescribed.    Client was provided recommendation to follow-up with prescribing physician.    Mental Status Assessment:  Appearance:   Appropriate   Eye Contact:   Good   Psychomotor Behavior: Normal   Attitude:   Cooperative   Orientation:   All  Speech   Rate / Production: Normal    Volume:  Normal   Mood:    Normal  Affect:    Appropriate   Thought Content:  Clear   Thought Form:  Coherent  Logical   Insight:    Good       Review of  Symptoms:  Depression: Ruminations  Saumya:  No symptoms  Psychosis: No symptoms  Anxiety: Worries Nervousness muscle tension, racing thoughts, relationship difficulties  Panic:  Palpitations Shortness of Breath  Post Traumatic Stress Disorder: nightmares  Obsessive Compulsive Disorder: No symptoms  Eating Disorder: No symptoms  Oppositional Defiant Disorder: No symptoms  ADD / ADHD: No symptoms  Conduct Disorder: No symptoms      Safety Assessment:    History of Safety Concerns:   Client reported a history of suicidal ideation.  Onset: in middle school and frequency: has not experienced in years.  Client identified the following triggers to suicidal ideation: trauma symptoms, low self-esteem  Client denied a history of suicide attempts.    Client denied a history of homicidal ideation.    Client reported a history of self-injurious ideation.  Onset: middle school and frequency: none recently.  Client reported a history of self-injurious behaivors: She engaged in scratching and cutting behaviors in the past.  .  Client denied a history of personal safety concerns.    Client denied a history of assaultive behaviors.        Current Safety Concerns:  Client denies current suicidal ideation.    Client denies current homicidal ideation and behaviors.  Client denies current self-injurious ideation and behaviors.    Client denies current concerns for personal safety.      Client reports there are no firearms in the house.     Plan for Safety and Risk Management:  A safety and risk management plan has not been developed at this time, however client was given the after-hours number / 911 should there be a change in any of these risk factors.    Client's Strengths and Limitations:  Client identified the following strengths or resources that will help her succeed in counseling: intelligence and committment and perserverence. Client identified the following supports: family and friends. Things that may interfere with the  client's success in counseling include: lack of social support and educational demands.      Diagnostic Criteria:  A. Excessive anxiety and worry about a number of events or activities (such as work or school performance).   B. The person finds it difficult to control the worry.   - Restlessness or feeling keyed up or on edge.    - Difficulty concentrating or mind going blank.    - Irritability.    - Muscle tension.   D. The focus of the anxiety and worry is not confined to features of an Axis I disorder.  E. The anxiety, worry, or physical symptoms cause clinically significant distress or impairment in social, occupational, or other important areas of functioning.   F. The disturbance is not due to the direct physiological effects of a substance (e.g., a drug of abuse, a medication) or a general medical condition (e.g., hyperthyroidism) and does not occur exclusively during a Mood Disorder, a Psychotic Disorder, or a Pervasive Developmental Disorder.  1. Recurrent unexpected panic attacks and meets criteria 2, 3, and 4 (below)  2. At least one of the attacks has been followed by 1 month (or more) of one (or more) of the following:     (b) worry about the implications of the attack or its consequences     (c) a significant change in behavior related to the attacks  3. Absence of agoraphobia  4. The panic attacks are not to the the direct physiological effects of a substance or general medical condition  5. The panic attacks are not better accounted for by another mental disorder, such as social phobia, specific phobia, OCD, PTSD, or separation anxiety disorder      Functional Status:  Client's symptoms are causing reduced functional status in the following areas: Activities of Daily Living - Cathy stated that her parents have asked her why she is not able to function like others her age  Occupational / Vocational - her employer struggled to accept her symptoms, and she has expirienced panic attacks at work on a  few occasions  Social / Relational - she struggles to interact with peers at school and work, and is anxious about meeting new people      DSM5 Diagnoses: (Sustained by DSM5 Criteria Listed Above)  Diagnoses: 311 (F32.8) Other/unspec. Depressive Disorder  300.01 (F41.0) Panic Disorder  300.02 (F41.1) Generalized Anxiety Disorder  Psychosocial & Contextual Factors: Cathy's employment causes her a significant amount of stress, as do arguments between family members and her peers.  WHODAS 2.0 (12 item)            This questionnaire asks about difficulties due to health conditions. Health conditions  include  disease or illnesses, other health problems that may be short or long lasting,  injuries, mental health or emotional problems, and problems with alcohol or drugs.                     Think back over the past 30 days and answer these questions, thinking about how much  difficulty you had doing the following activities. For each question, please [place] only  one response.    S1 Standing for long periods such as 30 minutes? None =         1   S2 Taking care of household responsibilities? Mild =           2   S3 Learning a new task, for example, learning how to get to a new place? Mild =           2   S4 How much of a problem do you have joining community activities (for example, festivals, [place]oration or other activities) in the same way as anyone else can? Severe =       4   S5 How much have you been emotionally affected by your health problems? Severe =       4     In the past 30 days, how much difficulty did you have in:   S6 Concentrating on doing something for ten minutes? Mild =           2   S7 Walking a long distance such as a kilometer (or equivalent)? Mild =           2   S8 Washing your whole body? None =         1   S9 Getting dressed? None =         1   S10 Dealing with people you do not know? Severe =       4   S11 Maintaining a friendship? Mild =           2   S12 Your day to day work? Mild =            2     H1 Overall, in the past 30 days, how many days were these difficulties present? Record number of days 5   H2 In the past 30 days, for how many days were you totally unable to carry out your usual activities or work because of any health condition? Record number of days  0   H3 In the past 30 days, not counting the days that you were totally unable, for how many days did you cut back or reduce your usual activities or work because of any health condition? Record number of days 5     Attendance Agreement:  Client has signed Attendance Agreement:Yes      Collaboration:  Collaboration with other professionals is not indicated at this time.      Preliminary Treatment Plan:  The client reports no currently identified Restorationist, ethnic or cultural issues relevant to therapy.     services are not indicated.    Modifications to assist communication are not indicated.    The concerns identified by the client will be addressed in therapy.    Initial Treatment will focus on: Anxiety - decreasing anxiety and panic symptoms, learning effective coping skills, and managing symptoms.    As a preliminary treatment goal, client will experience a reduction in anxiety, will develop more effective coping skills to manage anxiety symptoms and will increase ability to function adaptively.    The focus of initial interventions will be to alleviate anxiety, alleviate obsessional thinking, increase coping skills, increase self esteem, reduce panic attacks, teach CBT skills and teach DBT skills.    Referral to another professional/service is not indicated at this time..    A Release of Information is not needed at this time.    Report to child / adult protection services was NA.    Client will have access to their [facility]' medical record.    Lilibeth Cedeño, [facility]  August 24, 2017

## 2017-08-24 NOTE — Clinical Note
Cathy attended her intake counseling session with me today. She is scheduled to return next Thursday for a follow-up appointment. Our main goal is to decrease her anxiety and panic symptoms, but also continue to assess for depression. She reported more anxiety and panic symptoms at this time. She indicated that she has not noticed her medications making a difference yet, so we discussed that it can take time for them to kick in.

## 2017-08-25 ASSESSMENT — ANXIETY QUESTIONNAIRES: GAD7 TOTAL SCORE: 19

## 2017-08-31 ENCOUNTER — OFFICE VISIT (OUTPATIENT)
Dept: PSYCHOLOGY | Facility: CLINIC | Age: 22
End: 2017-08-31
Attending: PHYSICIAN ASSISTANT
Payer: COMMERCIAL

## 2017-08-31 DIAGNOSIS — F41.1 GENERALIZED ANXIETY DISORDER: Primary | ICD-10-CM

## 2017-08-31 PROCEDURE — 90834 PSYTX W PT 45 MINUTES: CPT | Performed by: COUNSELOR

## 2017-08-31 NOTE — PROGRESS NOTES
Progress Note    Client Name: Cathy Galloway  Date: 8/31/2017         Service Type: Individual      Session Start Time: 3:03pm  Session End Time: 3:50pm      Session Length: 47 minutes     Session #: 2     Attendees: Client attended alone    Treatment Plan Last Reviewed: 8/31/2017  PHQ-9 / THANG-7 : 8/24/2017     DATA      Progress Since Last Session (Related to Symptoms / Goals / Homework):   Symptoms: Improved    Homework: Partially completed      Episode of Care Goals: Satisfactory progress - PREPARATION (Decided to change - considering how); Intervened by negotiating a change plan and determining options / strategies for behavior change, identifying triggers, exploring social supports, and working towards setting a date to begin behavior change     Current / Ongoing Stressors and Concerns:   Cathy started school and has been attending classes. She has noticed that her medication results in her feeling as if her fight or flight response is decreased, and she is not having reactions that she should be to stressors. She reported that she would normally have bigger upsets over certain situations that she is finding herself very calm about. She reported having some concerns about her current calm state, and worries that her adrenaline would not kick in if she were in danger.     Treatment Objective(s) Addressed in This Session:   identify 3 initial signs or symptoms of anxiety  Cathy reported that she can often tell her anxiety is creeping higher based on her heart racing, her vein in her neck beating, and her thoughts begin to race. She stated that her racing thoughts have been more prevalent throughout the few weeks that she has been taking the medication.     Intervention:   DBT: Mindfulness skills - 5 sense and self soothing        ASSESSMENT: Current Emotional / Mental Status (status of significant symptoms):   Risk status (Self / Other harm or suicidal  ideation)   Client denies current fears or concerns for personal safety.   Client denies current or recent suicidal ideation or behaviors.   Client denies current or recent homicidal ideation or behaviors.   Client denies current or recent self injurious behavior or ideation.   Client denies other safety concerns.   A safety and risk management plan has not been developed at this time, however client was given the after-hours number / 911 should there be a change in any of these risk factors.     Appearance:   Appropriate    Eye Contact:   Good    Psychomotor Behavior: Normal  fidgety    Attitude:   Cooperative    Orientation:   All   Speech    Rate / Production: Normal     Volume:  Normal    Mood:    Normal   Affect:    Appropriate    Thought Content:  Clear    Thought Form:  Coherent  Goal Directed  Logical    Insight:    Good      Medication Review:   No changes to current psychiatric medication(s)     Medication Compliance:   Yes     Changes in Health Issues:   None reported     Chemical Use Review:   Substance Use: Chemical use reviewed, no active concerns identified      Tobacco Use: No current tobacco use.       Collateral Reports Completed:   Not Applicable    PLAN: (Client Tasks / Therapist Tasks / Other)  Cathy will return for her next appointment on Thursday September 7        Lilibeth Cedeño, [facility]                                                         ________________________________________________________________________    Treatment Plan    Client's Name: Cathy Galloway  YOB: 1995    Date: 8/31/2017    DSM-V Diagnoses: 300.02 (F41.1) Generalized Anxiety Disorder  Psychosocial / Contextual Factors: stress of school starting, stress at work with customers  WHODAS: reviewed 8/24/2017    Referral / Collaboration:  Referral to another professional/service is not indicated at this time..    Anticipated number of session or this episode of care: 2      MeasurableTreatment Goal(s)  related to diagnosis / functional impairment(s)  Goal 1: Client will report a decrease in anxiety symptoms.    I will know I've met my goal when there is a decrease in anxiety attacks.      Objective #A (Client Action)    Client will identify 3 fears / thoughts that contribute to feeling anxious.  Status: New - Date: 8/31/2017     Intervention(s)  Therapist will provide the client with phone numbers for after-hours emergencies and instructions for how to contact the therapist.    Objective #B  Client will identify 3 initial signs or symptoms of anxiety.  Status: New - Date: 8/31/2017     Intervention(s)  Therapist will teach emotional recognition/identification. identifying location in her body where stress and anxiety manifest.    Objective #C  Client will use at least 2 coping skills for anxiety management in the next 1 weeks.  Status: New - Date: 8/31/2017     Intervention(s)  Therapist will teach self-soothe and 5 senses.      Goal 2: Client will identify thoughts and feelings that lead to anxiety.    I will know I've met my goal when I can identify triggers.      Objective #A (Client Action)    Status: New - Date: 8/31/2017     Client will use thought-stopping strategy daily to reduce intrusive thoughts.    Intervention(s)  Therapist will teach the client how to perform a behavioral chain analysis. Thought stopping techniques.      Goal 3: Client will utilize coping skills when needed to decrease anxiety attacks    I will know I've met my goal when decrease the frequency of anxiety attacks.      Objective #A (Client Action)    Status: New - Date: 8/31/2017     Client will use at least 3 coping skills for anxiety management in the next 1 weeks.    Intervention(s)  Therapist will help identify common coping skills to practice throughout the week.          Client has reviewed and agreed to the above plan.      Lilibeth Cedeño, [facility]  August 31, 2017

## 2017-08-31 NOTE — MR AVS SNAPSHOT
"                  MRN:[id number]                      After Visit Summary   2017    Cathy Galloway    MRN: [id number]           Visit Information        Provider Department      2017 3:00 PM Lilibeth Cedeño [facility]      Your next 10 appointments already scheduled     Sep 05, 2017  9:30 AM CDT   [facility] with Rolly King, PT   [facility] [place] [facility] ([facility] [place])    [address]  [address]  [place] [address]   [phone]            Sep 12, 2017 10:00 AM CDT   [facility] with [facility] King, PT   [facility] [place] [facility] ([facility] [place])    [address]  [place] [address]   [phone]              MyChart Information     [employer] lets you send messages to your doctor, view your test results, renew your prescriptions, schedule appointments and more. To sign up, go to [email or web address] . Click on \"Log in\" on the left side of the screen, which will take you to the Welcome page. Then click on \"Sign up Now\" on the right side of the page.     You will be asked to enter the access code listed below, as well as some personal information. Please follow the directions to create your username and password.     Your access code is: [email or web address]  Expires: 2017  9:04 PM     Your access code will  in 90 days. If you need help or a new code, please call your [facility] clinic or [phone].        Care EveryWhere ID     This is your Care EveryWhere ID. This could be used by other organizations to access your [facility] medical records  [id number]        Equal Access to Services     NOE JEONG : Rosibel Bardales, doirs martin, mir kaalmasruthi nava, javon [place]. So [facility] [phone].    ATENCIÓN: Si habla español, tiene a gore disposición servicios gratuitos de asistencia lingüística. Llame al [phone].    We comply " with applicable federal civil rights laws and [place] laws. We do not discriminate on the basis of race, color, national origin, age, disability sex, sexual orientation or gender identity.

## 2017-09-07 ENCOUNTER — OFFICE VISIT (OUTPATIENT)
Dept: PSYCHOLOGY | Facility: CLINIC | Age: 22
End: 2017-09-07
Payer: COMMERCIAL

## 2017-09-07 DIAGNOSIS — F41.1 GENERALIZED ANXIETY DISORDER: Primary | ICD-10-CM

## 2017-09-07 PROCEDURE — 90834 PSYTX W PT 45 MINUTES: CPT | Performed by: COUNSELOR

## 2017-09-07 NOTE — PROGRESS NOTES
Progress Note    Client Name: Cathy Galloway  Date: 9/7/2017         Service Type: Individual      Session Start Time: 11:00am  Session End Time: 11:47am      Session Length: 47 minutes     Session #: 3     Attendees: Client attended alone    Treatment Plan Last Reviewed: 8/31/2017  PHQ-9 / THANG-7 : 8/24/2017     DATA      Progress Since Last Session (Related to Symptoms / Goals / Homework):   Symptoms: Improved    Homework: Partially completed      Episode of Care Goals: Satisfactory progress - ACTION (Actively working towards change); Intervened by reinforcing change plan / affirming steps taken     Current / Ongoing Stressors and Concerns:   No major stressors reported. 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 She does not feel he would be a helpful teacher for her.     Treatment Objective(s) Addressed in This Session:   increase length and frequency of contact with others Cathy reported a number of increased interactions with peers and family members over the week.  Cathy reported a positive increase in social and familial interactions over the week which boosted her self-esteem and decreased her anxiety. She reported having increased mood since having these social interactions with others.     Intervention:   Interpersonal Therapy: Reflective listening, modeling, acknowledging role in situation        ASSESSMENT: Current Emotional / Mental Status (status of significant symptoms):   Risk status (Self / Other harm or suicidal ideation)   Client denies current fears or concerns for personal safety.   Client denies current or recent suicidal ideation or behaviors.   Client denies current or recent homicidal ideation or behaviors.   Client denies current or recent self injurious behavior or ideation.   Client denies other  safety concerns.   A safety and risk management plan has not been developed at this time, however client was given the after-hours number / 911 should there be a change in any of these risk factors.     Appearance:   Appropriate    Eye Contact:   Good    Psychomotor Behavior: Normal    Attitude:   Cooperative    Orientation:   All   Speech    Rate / Production: Normal     Volume:  Normal    Mood:    Elevated  Normal   Affect:    Appropriate    Thought Content:  Clear    Thought Form:  Coherent  Logical    Insight:    Good      Medication Review:   No changes to current psychiatric medication(s)     Medication Compliance:   Yes     Changes in Health Issues:   None reported     Chemical Use Review:   Substance Use: Chemical use reviewed, no active concerns identified      Tobacco Use: No current tobacco use.       Collateral Reports Completed:   Not Applicable    PLAN: (Client Tasks / Therapist Tasks / Other)  Cathy will return for session next week, then consider transition to every-other week        Lilibeth Cedeño LPC                                                         ________________________________________________________________________    Treatment Plan     Client's Name: Cathy Galloway                                           YOB: 1995     Date: 8/31/2017     DSM-V Diagnoses: 300.02 (F41.1) Generalized Anxiety Disorder  Psychosocial / Contextual Factors: stress of school starting, stress at work with customers  WHODAS: reviewed 8/24/2017     Referral / Collaboration:  Referral to another professional/service is not indicated at this time..     Anticipated number of session or this episode of care: 2        MeasurableTreatment Goal(s) related to diagnosis / functional impairment(s)  Goal 1: Client will report a decrease in anxiety symptoms.    I will know I've met my goal when there is a decrease in anxiety attacks.       Objective #A (Client Action)                                     Client will identify 3 fears / thoughts that contribute to feeling anxious.  Status: New - Date: 8/31/2017      Intervention(s)  Therapist will provide the client with phone numbers for after-hours emergencies and instructions for how to contact the therapist.     Objective #B  Client will identify 3 initial signs or symptoms of anxiety.  Status: New - Date: 8/31/2017      Intervention(s)  Therapist will teach emotional recognition/identification. identifying location in her body where stress and anxiety manifest.     Objective #C  Client will use at least 2 coping skills for anxiety management in the next 1 weeks.  Status: New - Date: 8/31/2017      Intervention(s)  Therapist will teach self-soothe and 5 senses.        Goal 2: Client will identify thoughts and feelings that lead to anxiety.    I will know I've met my goal when I can identify triggers.       Objective #A (Client Action)                                    Status: New - Date: 8/31/2017      Client will use thought-stopping strategy daily to reduce intrusive thoughts.     Intervention(s)  Therapist will teach the client how to perform a behavioral chain analysis. Thought stopping techniques.        Goal 3: Client will utilize coping skills when needed to decrease anxiety attacks    I will know I've met my goal when decrease the frequency of anxiety attacks.       Objective #A (Client Action)                                    Status: New - Date: 8/31/2017      Client will use at least 3 coping skills for anxiety management in the next 1 weeks.     Intervention(s)  Therapist will help identify common coping skills to practice throughout the week.              Client has reviewed and agreed to the above plan.     Lilibeth Cedeño, UYEN  September 7, 2017

## 2017-09-07 NOTE — MR AVS SNAPSHOT
"                  MRN:[id number]                      After Visit Summary   2017    Cathy Galloway    MRN: [id number]           Visit Information        Provider Department      2017 11:00 AM Lilibeth Cedeño LPC [facility] Generic      Your next 10 appointments already scheduled     Sep 12, 2017 10:00 AM CDT   [facility] Spine with Rolly King, PT   [facility] [place] [facility] ([facility] [place])    [address]  [address]  [place] [id number] [phone]            Sep 14, 2017  4:00 PM CDT   Return Visit with Lilibeth Cedeño LPC   [facility] ([facility])    [address] [address] [phone]              MyChart Information     [employer] lets you send messages to your doctor, view your test results, renew your prescriptions, schedule appointments and more. To sign up, go to [email or web address] . Click on \"Log in\" on the left side of the screen, which will take you to the Welcome page. Then click on \"Sign up Now\" on the right side of the page.     You will be asked to enter the access code listed below, as well as some personal information. Please follow the directions to create your username and password.     Your access code is: [email or web address]  Expires: 2017  9:04 PM     Your access code will  in 90 days. If you need help or a new code, please call your [place] clinic or [phone].        Care EveryWhere ID     This is your Care EveryWhere ID. This could be used by other organizations to access your [place] medical records  [id number]        Equal Access to Services     NOE JEONG AH: Hadii ralph Bardales, waanandda luqadaha, qaybta kaalmada adefabianyarsuthi, javon [place]. So [facility] [phone].    ATENCIÓN: Si habla español, tiene a gore disposición servicios gratuitos de asistencia lingüística. Llame al [phone].    We comply with applicable " federal civil rights laws and [place] laws. We do not discriminate on the basis of race, color, national origin, age, disability sex, sexual orientation or gender identity.

## 2017-09-12 ENCOUNTER — THERAPY VISIT (OUTPATIENT)
Dept: PHYSICAL THERAPY | Facility: CLINIC | Age: 22
End: 2017-09-12
Payer: COMMERCIAL

## 2017-09-12 DIAGNOSIS — M26.609 TMJ (TEMPOROMANDIBULAR JOINT SYNDROME): ICD-10-CM

## 2017-09-12 DIAGNOSIS — M54.2 CERVICALGIA: ICD-10-CM

## 2017-09-12 DIAGNOSIS — M79.18 MYOFASCIAL MUSCLE PAIN: ICD-10-CM

## 2017-09-12 PROCEDURE — 97140 MANUAL THERAPY 1/> REGIONS: CPT | Mod: GP | Performed by: PHYSICAL THERAPIST

## 2017-09-12 PROCEDURE — 97110 THERAPEUTIC EXERCISES: CPT | Mod: GP | Performed by: PHYSICAL THERAPIST

## 2017-09-12 PROCEDURE — 97112 NEUROMUSCULAR REEDUCATION: CPT | Mod: GP | Performed by: PHYSICAL THERAPIST

## 2017-09-12 NOTE — MR AVS SNAPSHOT
"              After Visit Summary   9/12/2017    Cathy Galloway    MRN: [id number]           Patient Information     Date Of Birth          1995        Visit Information        Provider Department      9/12/2017 10:00 AM Rolly King PT [facility] [place] [facility]        Today's Diagnoses     TMJ (temporomandibular joint syndrome)        Myofascial muscle pain        Cervicalgia           Follow-ups after your visit        Your next 10 appointments already scheduled     Sep 14, 2017  4:00 PM CDT   Return Visit with Lilibeth Cedeño LPC   [facility] ([facility])    [address] [address] [phone]              Who to contact     If you have questions or need follow up information about today's clinic visit or your schedule please contact [facility] [facility] directly at [phone].  Normal or non-critical lab and imaging results will be communicated to you by MyChart, letter or phone within 4 business days after the clinic has received the results. If you do not hear from us within 7 days, please contact the clinic through [employer] or phone. If you have a critical or abnormal lab result, we will notify you by phone as soon as possible.  Submit refill requests through [employer] or call your pharmacy and they will forward the refill request to us. Please allow 3 business days for your refill to be completed.          Additional Information About Your Visit        MyChart Information     [employer] lets you send messages to your doctor, view your test results, renew your prescriptions, schedule appointments and more. To sign up, go to [email or web address] . Click on \"Log in\" on the left side of the screen, which will take you to the Welcome page. Then click on \"Sign up Now\" on the right side of the page.     You will be asked to enter the access code listed below, as well as some personal information. Please follow the " directions to create your username and password.     Your access code is: [email or web address]  Expires: 2017  9:04 PM     Your access code will  in 90 days. If you need help or a new code, please call your [place] clinic or [phone].        Care EveryWhere ID     This is your Care EveryWhere ID. This could be used by other organizations to access your [place] medical records  [id number]         Blood Pressure from Last 3 Encounters:   08/10/17 110/80   17 130/81   17 111/78    Weight from Last 3 Encounters:   08/10/17 106.6 kg (235 lb)   17 106.6 kg (235 lb) (>99 %)*   17 107.8 kg (237 lb 9.6 oz) (>99 %)*     * Growth percentiles are based on [facility] 2-20 Years data.              We Performed the Following     [facility]IN[facility] PROGRESS NOTES REPORT     MANUAL THER TECH,1+REGIONS,EA 15 MIN     NEUROMUSCULAR RE-EDUCATION     THERAPEUTIC EXERCISES        Primary Care Provider Office Phone # Fax #    Blanche Rojas PA-C [phone]       [address] [place] [address]        Equal Access to Services     TOVA JEONG : Hadii aad ku hadasho Soomaali, waaxda luqadaha, qaybta kaalmada adeegyada, waxay luz elenain haydeyan eliana blake . So [facility] [phone].    ATENCIÓN: Si habla español, tiene a gore disposición servicios gratuitos de asistencia lingüística. Llame al [phone].    We comply with applicable federal civil rights laws and [place] laws. We do not discriminate on the basis of race, color, national origin, age, disability sex, sexual orientation or gender identity.            Thank you!     Thank you for choosing [facility]  for your care. Our goal is always to provide you with excellent care. Hearing back from our patients is one way we can continue to improve our services. Please take a few minutes to complete the written survey that you may receive in the mail after your visit with us. Thank you!             Your Updated  Medication List - Protect others around you: Learn how to safely use, store and throw away your medicines at www.disposemymeds.org.          This list is accurate as of: 9/12/17 10:40 AM.  Always use your most recent med list.                   Brand Name Dispense Instructions for use Diagnosis    escitalopram 10 MG tablet    LEXAPRO    30 tablet    Take 1/2 tab daily for 1 week, then increase to 1 full tab daily thereafter    THANG (generalized anxiety disorder), Dysthymia       ibuprofen 600 MG tablet    ADVIL/MOTRIN    120 tablet    Take 1 tablet by mouth every 6 hours as needed for pain.    Aortic stenosis       IRON SUPPLEMENT PO      Reported on 3/30/2017        levothyroxine 25 MCG tablet    SYNTHROID/LEVOTHROID    90 tablet    Take 1 tablet (25 mcg) by mouth daily    Hypothyroidism due to acquired atrophy of thyroid       LORazepam 0.5 MG tablet    ATIVAN    25 tablet    Take 0.5-1 tablets (0.25-0.5 mg) by mouth every 8 hours as needed for anxiety    THANG (generalized anxiety disorder)       norethindrone 0.35 MG per tablet    MICRONOR    90 tablet    Take 1 tablet (0.35 mg) by mouth daily    Dysmenorrhea

## 2017-09-12 NOTE — PROGRESS NOTES
Subjective:    HPI                    Objective:    System    Physical Exam    General     ROS    Assessment/Plan:      DISCHARGE REPORT    Progress reporting period is from 6/29/17 to 9/12/17.       SUBJECTIVE  Subjective changes noted by patient: Patient returns after a 1 month hiatus from PT. She missed her last 2 appointments for unknown reasons. There is almost no pain on the left side. There is a loud click/pop with yawning and opening. There is some pain on the right (points to joint) with chewing on the left. Doing exercises daily in the morning and feels this helps for the rest of the day.    Current pain level is 0/10 (4/10 on right with chewing).     Previous pain level was 6/10.   Changes in function:  Yes (See Goal flowsheet attached for changes in current functional level)  Adverse reaction to treatment or activity: None    OBJECTIVE  Changes noted in objective findings: Cervical ROM full and painfree. Jaw opening 55 mm with left deviation and audible click/pop near end range opening. Laterotrusion L 12 mm, R 10 mm. Improved opening mechanics after treatment today.     ASSESSMENT/PLAN  Updated problem list and treatment plan: Diagnosis 1:  TMD    Pain -  home program  Decreased strength - home program  Impaired muscle performance - home program  STG/LTGs have been met or progress has been made towards goals:  Yes (See Goal flow sheet completed today.)  Assessment of Progress: The patient's condition is improving.  The patient's condition has potential to improve.  Self Management Plans:  Patient has been instructed in a home treatment program.  Patient is independent in a home treatment program.  I have re-evaluated this patient and find that the nature, scope, duration and intensity of the therapy is appropriate for the medical condition of the patient.  Cathy continues to require the following intervention to meet STG and LTG's:  PT intervention is no longer required to meet  STG/LTG.    Recommendations:  This patient is ready to be discharged from therapy and continue their home treatment program.    Please refer to the daily flowsheet for treatment today, total treatment time and time spent performing 1:1 timed codes.

## 2017-09-14 ENCOUNTER — OFFICE VISIT (OUTPATIENT)
Dept: PSYCHOLOGY | Facility: CLINIC | Age: 22
End: 2017-09-14
Payer: COMMERCIAL

## 2017-09-14 DIAGNOSIS — F41.1 GENERALIZED ANXIETY DISORDER: Primary | ICD-10-CM

## 2017-09-14 PROBLEM — F32.A DEPRESSIVE DISORDER: Status: ACTIVE | Noted: 2017-08-24

## 2017-09-14 PROCEDURE — 90834 PSYTX W PT 45 MINUTES: CPT | Performed by: COUNSELOR

## 2017-09-14 NOTE — PROGRESS NOTES
Progress Note    Client Name: Cathy Galloway  Date: 9/14/2017         Service Type: Individual      Session Start Time: 4:05pm  Session End Time: 4:55pm      Session Length: 50 minues     Session #: 4     Attendees: Client attended alone    Treatment Plan Last Reviewed: 8/31/2017  PHQ-9 / THANG-7 : 8/24/2017     DATA      Progress Since Last Session (Related to Symptoms / Goals / Homework):   Symptoms: Improved    Homework: Partially completed  Completed in session      Episode of Care Goals: Satisfactory progress - PREPARATION (Decided to change - considering how); Intervened by negotiating a change plan and determining options / strategies for behavior change, identifying triggers, exploring social supports, and working towards setting a date to begin behavior change     Current / Ongoing Stressors and Concerns:   Still not having same reactions to things that she had prior to medication. Good for a decrease in anxiety, but also takes more energy to get happy or excited about things. Reconnected with an old friend at a wedding reception this weekend, and he asked her out on a date. She was excited about that.     Treatment Objective(s) Addressed in This Session:   Identify any changes to mood and anxiety related to medication, and consult with PCP if medication causes negative side effects or stressors  Cathy reported that she still is not experiencing the rush of adrenaline that she is used to when she becomes anxious. She noted that her racing thoughts have slowed and she is finding it much easier to focus and concentrate on things.       Intervention:   DBT: mindfulness of symptoms, mindfulness of lack of anxiety and what stressors that causes  Interpersonal Therapy: Reflective listening        ASSESSMENT: Current Emotional / Mental Status (status of significant symptoms):   Risk status (Self / Other harm or suicidal ideation)   Client denies current fears or  concerns for personal safety.   Client denies current or recent suicidal ideation or behaviors.   Client denies current or recent homicidal ideation or behaviors.   Client denies current or recent self injurious behavior or ideation.   Client denies other safety concerns.   A safety and risk management plan has not been developed at this time, however client was given the after-hours number / 911 should there be a change in any of these risk factors.     Appearance:   Appropriate    Eye Contact:   Good    Psychomotor Behavior: Normal    Attitude:   Cooperative    Orientation:   All   Speech    Rate / Production: Normal     Volume:  Normal    Mood:    Normal   Affect:    Appropriate    Thought Content:  Clear    Thought Form:  Coherent  Logical    Insight:    Good      Medication Review:   No changes to current psychiatric medication(s)     Medication Compliance:   Yes     Changes in Health Issues:   None reported     Chemical Use Review:   Substance Use: Chemical use reviewed, no active concerns identified      Tobacco Use: No current tobacco use.       Collateral Reports Completed:   Not Applicable    PLAN: (Client Tasks / Therapist Tasks / Other)  Continue with therapy every other week.        Lilibeth Cedeño, [facility]                                                         ________________________________________________________________________    Treatment Plan      Client's Name: Cathy Galloway                                           YOB: 1995      Date: 8/31/2017      DSM-V Diagnoses: 300.02 (F41.1) Generalized Anxiety Disorder  Psychosocial / Contextual Factors: stress of school starting, stress at work with customers  WHODAS: reviewed 8/24/2017      Referral / Collaboration:  Referral to another professional/service is not indicated at this time..      Anticipated number of session or this episode of care: 2          MeasurableTreatment Goal(s) related to diagnosis / functional  impairment(s)  Goal 1: Client will report a decrease in anxiety symptoms.    I will know I've met my goal when there is a decrease in anxiety attacks.        Objective #A (Client Action)                                    Client will identify 3 fears / thoughts that contribute to feeling anxious.  Status: New - Date: 8/31/2017       Intervention(s)  Therapist will provide the client with phone numbers for after-hours emergencies and instructions for how to contact the therapist.      Objective #B  Client will identify 3 initial signs or symptoms of anxiety.  Status: New - Date: 8/31/2017       Intervention(s)  Therapist will teach emotional recognition/identification. identifying location in her body where stress and anxiety manifest.      Objective #C  Client will use at least 2 coping skills for anxiety management in the next 1 weeks.  Status: New - Date: 8/31/2017       Intervention(s)  Therapist will teach self-soothe and 5 senses.          Goal 2: Client will identify thoughts and feelings that lead to anxiety.    I will know I've met my goal when I can identify triggers.        Objective #A (Client Action)                                    Status: New - Date: 8/31/2017       Client will use thought-stopping strategy daily to reduce intrusive thoughts.      Intervention(s)  Therapist will teach the client how to perform a behavioral chain analysis. Thought stopping techniques.          Goal 3: Client will utilize coping skills when needed to decrease anxiety attacks    I will know I've met my goal when decrease the frequency of anxiety attacks.        Objective #A (Client Action)                                    Status: New - Date: 8/31/2017       Client will use at least 3 coping skills for anxiety management in the next 1 weeks.      Intervention(s)  Therapist will help identify common coping skills to practice throughout the week.                  Client has reviewed and agreed to the above plan.  Lilibeth  CARMENZA Cedeño, [facility]  September 14, 2017

## 2017-09-14 NOTE — MR AVS SNAPSHOT
"                  MRN:[id number]                      After Visit Summary   2017    Cathy Galloway    MRN: [id number]           Visit Information        Provider Department      2017 4:00 PM Lilibeth Cedeño LPC [facility] Generic      Your next 10 appointments already scheduled     Sep 26, 2017 11:00 AM CDT   Return Visit with Lilibeth Cedeño LPC   [facility] ([facility])    [address] [address] [phone]              MyChart Information     [employer] lets you send messages to your doctor, view your test results, renew your prescriptions, schedule appointments and more. To sign up, go to [email or web address] . Click on \"Log in\" on the left side of the screen, which will take you to the Welcome page. Then click on \"Sign up Now\" on the right side of the page.     You will be asked to enter the access code listed below, as well as some personal information. Please follow the directions to create your username and password.     Your access code is: [email or web address]  Expires: 2017  9:04 PM     Your access code will  in 90 days. If you need help or a new code, please call your [place] clinic or [phone].        Care EveryWhere ID     This is your Care EveryWhere ID. This could be used by other organizations to access your [place] medical records  [id number]        Equal Access to Services     NOE JEONG AH: Hadii aad ku hadasho Soedmundoali, waaxda luqadaha, qaybta kaalmada adeegyada, javon martinez adefabian blake . So [facility] [phone].    ATENCIÓN: Si habla español, tiene a gore disposición servicios gratuitos de asistencia lingüística. Llame al [phone].    We comply with applicable federal civil rights laws and [place] laws. We do not discriminate on the basis of race, color, national origin, age, disability sex, sexual orientation or gender identity.            "

## 2017-09-26 ENCOUNTER — OFFICE VISIT (OUTPATIENT)
Dept: PSYCHOLOGY | Facility: CLINIC | Age: 22
End: 2017-09-26
Payer: COMMERCIAL

## 2017-09-26 DIAGNOSIS — F41.1 GENERALIZED ANXIETY DISORDER: Primary | ICD-10-CM

## 2017-09-26 PROCEDURE — 90834 PSYTX W PT 45 MINUTES: CPT | Performed by: COUNSELOR

## 2017-09-26 NOTE — MR AVS SNAPSHOT
"                  MRN:[id number]                      After Visit Summary   2017    Cathy Galloway    MRN: [id number]           Visit Information        Provider Department      2017 11:00 AM Lilibeth Cedeño LPC [facility] Generic      Your next 10 appointments already scheduled     Oct 10, 2017 11:00 AM CDT   Return Visit with Lilibeth Cedeño LPC   [facility] ([facility])    [address] [facility] [address] [phone]              MyChart Information     [employer] lets you send messages to your doctor, view your test results, renew your prescriptions, schedule appointments and more. To sign up, go to [email or web address] . Click on \"Log in\" on the left side of the screen, which will take you to the Welcome page. Then click on \"Sign up Now\" on the right side of the page.     You will be asked to enter the access code listed below, as well as some personal information. Please follow the directions to create your username and password.     Your access code is: [email or web address]  Expires: 2017  9:04 PM     Your access code will  in 90 days. If you need help or a new code, please call your [place] clinic or [phone].        Care EveryWhere ID     This is your Care EveryWhere ID. This could be used by other organizations to access your [place] medical records  [id number]        Equal Access to Services     NOE JEONG AH: Hadii aad ku hadasho Soedmundoali, waaxda luqadaha, qaybta kaalmada adeegyada, javon martinez adefabian blake . So [facility] [phone].    ATENCIÓN: Si habla español, tiene a gore disposición servicios gratuitos de asistencia lingüística. Llame al [phone].    We comply with applicable federal civil rights laws and [place] laws. We do not discriminate on the basis of race, color, national origin, age, disability sex, sexual orientation or gender identity.            "

## 2017-09-26 NOTE — PROGRESS NOTES
"                                             Progress Note    Client Name: Cathy Galloway  Date: 9/26/2017         Service Type: Individual      Session Start Time: 11:10am  Session End Time: 11:55am      Session Length: 45 minutes     Session #: 5     Attendees: Client attended alone    Treatment Plan Last Reviewed: 8/31/2017  PHQ-9 / THANG-7 :      DATA      Progress Since Last Session (Related to Symptoms / Goals / Homework):   Symptoms: Stable    Homework: Partially completed      Episode of Care Goals: Satisfactory progress - ACTION (Actively working towards change); Intervened by reinforcing change plan / affirming steps taken     Current / Ongoing Stressors and Concerns:   Recently started dating boyfriend. Reconnected at a wedding reception. The groom from the wedding reception is Cathy's best friend and also close with Cathy's new boyfriend. Some stress related to Cathy's relationship with both men.     Treatment Objective(s) Addressed in This Session:   use cognitive strategies identified in therapy to challenge anxious thoughts  Cathy attended her friend's wedding reception 2 weekends ago, and reconnected with an old friend who she has since started dating exclusively. The groom from the wedding indicated that he and his new wife agreed to an open marriage, and the groom has been pursuing Cathy. She stated that she used to date him in the past, but said no to him due to being in a new relationship herself. She noted that the groom did not take her rejection well, and has been interfering with her new relationship. There was a lot of processing regarding negative statements and feelings that have taken place over the last week since she told the groom \"no.\" He has tried intervening with Cathy and the boyfriend by telling each of them personal information about the other. Cathy and the therapist spent time working through ways she can communicate with her new boyfriend and the groom moving " forward, and what she wants with each relationship moving forward.     Intervention:   CBT: thought stopping to decrease anxiety in new relationship, checking the facts in situations  Interpersonal Therapy: Role playing situations, acknowledging role she plays in the situation  Solution Focused: looking at problem solving and what needs to happen to improve relationship with friend.        ASSESSMENT: Current Emotional / Mental Status (status of significant symptoms):   Risk status (Self / Other harm or suicidal ideation)   Client denies current fears or concerns for personal safety.   Client denies current or recent suicidal ideation or behaviors.   Client denies current or recent homicidal ideation or behaviors.   Client denies current or recent self injurious behavior or ideation.   Client denies other safety concerns.   A safety and risk management plan has not been developed at this time, however client was given the after-hours number / 911 should there be a change in any of these risk factors.     Appearance:   Appropriate    Eye Contact:   Good    Psychomotor Behavior: Normal    Attitude:   Cooperative    Orientation:   All   Speech    Rate / Production: Normal     Volume:  Normal    Mood:    Normal   Affect:    Appropriate    Thought Content:  Clear    Thought Form:  Coherent  Goal Directed  Logical    Insight:    Good      Medication Review:   No changes to current psychiatric medication(s)     Medication Compliance:   Yes     Changes in Health Issues:   None reported     Chemical Use Review:   Substance Use: Chemical use reviewed, no active concerns identified      Tobacco Use: No current tobacco use.       Collateral Reports Completed:   Not Applicable    PLAN: (Client Tasks / Therapist Tasks / Other)  Cathy will continue with individual counseling once every two weeks.        Lilibeth Cedeño, [facility]                                                          ________________________________________________________________________    Treatment Plan      Client's Name: Cathy Galloway                                           YOB: 1995      Date: 8/31/2017      DSM-V Diagnoses: 300.02 (F41.1) Generalized Anxiety Disorder  Psychosocial / Contextual Factors: stress of school starting, stress at work with customers  WHODAS: reviewed 8/24/2017      Referral / Collaboration:  Referral to another professional/service is not indicated at this time..      Anticipated number of session or this episode of care: 2          MeasurableTreatment Goal(s) related to diagnosis / functional impairment(s)  Goal 1: Client will report a decrease in anxiety symptoms.    I will know I've met my goal when there is a decrease in anxiety attacks.        Objective #A (Client Action)                                    Client will identify 3 fears / thoughts that contribute to feeling anxious.  Status: New - Date: 8/31/2017       Intervention(s)  Therapist will provide the client with phone numbers for after-hours emergencies and instructions for how to contact the therapist.      Objective #B  Client will identify 3 initial signs or symptoms of anxiety.  Status: New - Date: 8/31/2017       Intervention(s)  Therapist will teach emotional recognition/identification. identifying location in her body where stress and anxiety manifest.      Objective #C  Client will use at least 2 coping skills for anxiety management in the next 1 weeks.  Status: New - Date: 8/31/2017       Intervention(s)  Therapist will teach self-soothe and 5 senses.          Goal 2: Client will identify thoughts and feelings that lead to anxiety.    I will know I've met my goal when I can identify triggers.        Objective #A (Client Action)                                    Status: New - Date: 8/31/2017       Client will use thought-stopping strategy daily to reduce intrusive  thoughts.      Intervention(s)  Therapist will teach the client how to perform a behavioral chain analysis. Thought stopping techniques.          Goal 3: Client will utilize coping skills when needed to decrease anxiety attacks    I will know I've met my goal when decrease the frequency of anxiety attacks.        Objective #A (Client Action)                                    Status: New - Date: 8/31/2017       Client will use at least 3 coping skills for anxiety management in the next 1 weeks.      Intervention(s)  Therapist will help identify common coping skills to practice throughout the week.                  Client has reviewed and agreed to the above plan.    Lilibeth Cedeño, UYEN  September 26, 2017

## 2017-10-10 ENCOUNTER — OFFICE VISIT (OUTPATIENT)
Dept: PSYCHOLOGY | Facility: CLINIC | Age: 22
End: 2017-10-10
Payer: COMMERCIAL

## 2017-10-10 DIAGNOSIS — F41.1 GENERALIZED ANXIETY DISORDER: Primary | ICD-10-CM

## 2017-10-10 PROCEDURE — 90834 PSYTX W PT 45 MINUTES: CPT | Performed by: COUNSELOR

## 2017-10-10 NOTE — MR AVS SNAPSHOT
"                  MRN:[id number]                      After Visit Summary   10/10/2017    Cathy Galloway    MRN: [id number]           Visit Information        Provider Department      10/10/2017 11:00 AM Lilibeth Cedeño LPC [facility] Generic      Your next 10 appointments already scheduled     Oct 12, 2017  2:40 PM CDT   [facility]    [address] [facility] [address] [phone]           Bring a current list of meds and any records pertaining to this visit. For Physicals, please bring immunization records and any forms needing to be filled out. Please arrive 10 minutes early to complete paperwork.            Oct 24, 2017 11:00 AM CDT   Return Visit with Lilibeth Cedeño LPC   [facility] ([facility])    [address] [facility] [address] [phone]              MyChart Information     [employer] lets you send messages to your doctor, view your test results, renew your prescriptions, schedule appointments and more. To sign up, go to [email or web address] . Click on \"Log in\" on the left side of the screen, which will take you to the Welcome page. Then click on \"Sign up Now\" on the right side of the page.     You will be asked to enter the access code listed below, as well as some personal information. Please follow the directions to create your username and password.     Your access code is: [email or web address]  Expires: 2018  1:30 PM     Your access code will  in 90 days. If you need help or a new code, please call your [place] clinic or [phone].        Care EveryWhere ID     This is your Care EveryWhere ID. This could be used by other organizations to access your [place] medical records  [id number]        Equal Access to Services     NOE JEONG AH: Hadii ralph Bardales, waaxda luqadaha, qaybta kaalmada brandy, javon " jorge l adornoaaadriana ah. So [facility] [phone].    ATENCIÓN: Si habla español, tiene a gore disposición servicios gratuitos de asistencia lingüística. Llame al [phone].    We comply with applicable federal civil rights laws and [place] laws. We do not discriminate on the basis of race, color, national origin, age, disability, sex, sexual orientation, or gender identity.

## 2017-10-10 NOTE — PROGRESS NOTES
Progress Note    Client Name: Cathy Galloway  Date: 10/10/2017         Service Type: Individual      Session Start Time: 11:00am  Session End Time: 11:50am      Session Length: 50 minutes     Session #: 6     Attendees: Client attended alone    Treatment Plan Last Reviewed: 8/31/2017  PHQ-9 / THANG-7 : 8/24/2017     DATA      Progress Since Last Session (Related to Symptoms / Goals / Homework):   Symptoms: Improved    Homework: Partially completed      Episode of Care Goals: Satisfactory progress - ACTION (Actively working towards change); Intervened by reinforcing change plan / affirming steps taken     Current / Ongoing Stressors and Concerns:   Recently stopped talking to her friend because of the negativity he has brought into their relationship, as well as trying to break her up from her boyfriend.      Treatment Objective(s) Addressed in This Session:   use cognitive strategies identified in therapy to challenge anxious thoughts  Cathy and the therapist processed her reactions to her friend engaging in negative interactions with her and in regards to her new relationship. She stated that it is easier to distance herself from him, as he was creating more anxiety for her. She noticed herself becoming worked up over an interaction with her boyfriend, but after some guidance from her mother, she was able to challenge some of her distress and calm down to rectify the situation. She addressed plans that she and her boyfriend have for Halloween, and anxiety that she experiences when talking to her parents about her plans, as she feels she would need some permission from them for the activities (allowing boyfriend to spend the night if they are drinking in order to avoid drinking and driving. Worried about parents' judgments about the plans (drinking and boyfriend staying overnight).      Intervention:   CBT: challenging distressing thoughts  Interpersonal Therapy:  role playing interactions with her parents to address her needs        ASSESSMENT: Current Emotional / Mental Status (status of significant symptoms):   Risk status (Self / Other harm or suicidal ideation)   Client denies current fears or concerns for personal safety.   Client denies current or recent suicidal ideation or behaviors.   Client denies current or recent homicidal ideation or behaviors.   Client denies current or recent self injurious behavior or ideation.   Client denies other safety concerns.   A safety and risk management plan has not been developed at this time, however client was given the after-hours number / 911 should there be a change in any of these risk factors.     Appearance:   Appropriate    Eye Contact:   Good    Psychomotor Behavior: Normal    Attitude:   Cooperative    Orientation:   All   Speech    Rate / Production: Normal     Volume:  Normal    Mood:    Normal   Affect:    Appropriate    Thought Content:  Clear    Thought Form:  Coherent  Goal Directed  Logical    Insight:    Good      Medication Review:   No changes to current psychiatric medication(s)     Medication Compliance:   Yes     Changes in Health Issues:   None reported     Chemical Use Review:   Substance Use: Chemical use reviewed, no active concerns identified      Tobacco Use: No current tobacco use.       Collateral Reports Completed:   Not Applicable    PLAN: (Client Tasks / Therapist Tasks / Other)  Continue with individual therapy every other week.        Lilibeth Cedeño LPC                                                         ________________________________________________________________________    Treatment Plan     Client's Name: Cathy Galloway                                           YOB: 1995     Date: 8/31/2017     DSM-V Diagnoses: 300.02 (F41.1) Generalized Anxiety Disorder  Psychosocial / Contextual Factors: stress of school starting, stress at work with  customers  WHODAS: reviewed 8/24/2017     Referral / Collaboration:  Referral to another professional/service is not indicated at this time..     Anticipated number of session or this episode of care: 2        MeasurableTreatment Goal(s) related to diagnosis / functional impairment(s)  Goal 1: Client will report a decrease in anxiety symptoms.    I will know I've met my goal when there is a decrease in anxiety attacks.       Objective #A (Client Action)                                    Client will identify 3 fears / thoughts that contribute to feeling anxious.  Status: New - Date: 8/31/2017      Intervention(s)  Therapist will provide the client with phone numbers for after-hours emergencies and instructions for how to contact the therapist.     Objective #B  Client will identify 3 initial signs or symptoms of anxiety.  Status: New - Date: 8/31/2017      Intervention(s)  Therapist will teach emotional recognition/identification. identifying location in her body where stress and anxiety manifest.     Objective #C  Client will use at least 2 coping skills for anxiety management in the next 1 weeks.  Status: New - Date: 8/31/2017      Intervention(s)  Therapist will teach self-soothe and 5 senses.        Goal 2: Client will identify thoughts and feelings that lead to anxiety.    I will know I've met my goal when I can identify triggers.       Objective #A (Client Action)                                    Status: New - Date: 8/31/2017      Client will use thought-stopping strategy daily to reduce intrusive thoughts.     Intervention(s)  Therapist will teach the client how to perform a behavioral chain analysis. Thought stopping techniques.        Goal 3: Client will utilize coping skills when needed to decrease anxiety attacks    I will know I've met my goal when decrease the frequency of anxiety attacks.       Objective #A (Client Action)                                    Status: New - Date: 8/31/2017      Client  will use at least 3 coping skills for anxiety management in the next 1 weeks.     Intervention(s)  Therapist will help identify common coping skills to practice throughout the week.              Client has reviewed and agreed to the above plan.      Lilibeth Cedeño LPC  October 10, 2017

## 2017-10-12 ENCOUNTER — OFFICE VISIT (OUTPATIENT)
Dept: FAMILY MEDICINE | Facility: CLINIC | Age: 22
End: 2017-10-12
Payer: COMMERCIAL

## 2017-10-12 VITALS
HEART RATE: 98 BPM | WEIGHT: 246 LBS | BODY MASS INDEX: 40.98 KG/M2 | SYSTOLIC BLOOD PRESSURE: 111 MMHG | TEMPERATURE: 97 F | DIASTOLIC BLOOD PRESSURE: 75 MMHG | HEIGHT: 65 IN

## 2017-10-12 DIAGNOSIS — F41.1 GAD (GENERALIZED ANXIETY DISORDER): Primary | ICD-10-CM

## 2017-10-12 DIAGNOSIS — F34.1 DYSTHYMIA: ICD-10-CM

## 2017-10-12 PROBLEM — F32.A DEPRESSIVE DISORDER: Status: RESOLVED | Noted: 2017-08-24 | Resolved: 2017-10-12

## 2017-10-12 PROCEDURE — 99213 OFFICE O/P EST LOW 20 MIN: CPT | Performed by: PHYSICIAN ASSISTANT

## 2017-10-12 RX ORDER — ESCITALOPRAM OXALATE 10 MG/1
10 TABLET ORAL DAILY
Qty: 90 TABLET | Refills: 1 | Status: SHIPPED | OUTPATIENT
Start: 2017-10-12 | End: 2018-05-16

## 2017-10-12 RX ORDER — LORAZEPAM 0.5 MG/1
.25-.5 TABLET ORAL EVERY 8 HOURS PRN
Qty: 25 TABLET | Refills: 0 | Status: SHIPPED | OUTPATIENT
Start: 2017-10-12 | End: 2019-09-03

## 2017-10-12 ASSESSMENT — PATIENT HEALTH QUESTIONNAIRE - PHQ9
SUM OF ALL RESPONSES TO PHQ QUESTIONS 1-9: 5
5. POOR APPETITE OR OVEREATING: SEVERAL DAYS

## 2017-10-12 ASSESSMENT — ANXIETY QUESTIONNAIRES
1. FEELING NERVOUS, ANXIOUS, OR ON EDGE: SEVERAL DAYS
GAD7 TOTAL SCORE: 3
2. NOT BEING ABLE TO STOP OR CONTROL WORRYING: NOT AT ALL
7. FEELING AFRAID AS IF SOMETHING AWFUL MIGHT HAPPEN: NOT AT ALL
3. WORRYING TOO MUCH ABOUT DIFFERENT THINGS: NOT AT ALL
6. BECOMING EASILY ANNOYED OR IRRITABLE: SEVERAL DAYS
5. BEING SO RESTLESS THAT IT IS HARD TO SIT STILL: NOT AT ALL

## 2017-10-12 NOTE — MR AVS SNAPSHOT
"              After Visit Summary   10/12/2017    Cathy Galloway    MRN: [id number]           Patient Information     Date Of Birth          1995        Visit Information        Provider Department      10/12/2017 2:40 PM Vijay Hammonds PA-C [facility]        Today's Diagnoses     THANG (generalized anxiety disorder)    -  1    Dysthymia           Follow-ups after your visit        Your next 10 appointments already scheduled     Oct 24, 2017 11:00 AM CDT   Return Visit with Lilibeth Cedeño LPC   [facility] ([facility])    [address] [facility] [address] [phone]              Who to contact     Normal or non-critical lab and imaging results will be communicated to you by [employer], letter or phone within 4 business days after the clinic has received the results. If you do not hear from us within 7 days, please contact the clinic through MyChart or phone. If you have a critical or abnormal lab result, we will notify you by phone as soon as possible.  Submit refill requests through [facility] or call your pharmacy and they will forward the refill request to us. Please allow 3 business days for your refill to be completed.          If you need to speak with a  for additional information , please call: [phone]             Additional Information About Your Visit        [facility] Information     [facility] lets you send messages to your doctor, view your test results, renew your prescriptions, schedule appointments and more. To sign up, go to [email or web address] . Click on \"Log in\" on the left side of the screen, which will take you to the Welcome page. Then click on \"Sign up Now\" on the right side of the page.     You will be asked to enter the access code listed below, as well as some personal information. Please follow the directions to create your username and password.     Your access code is: [email or web address]  Expires: 1/8/2018  1:30 " "PM     Your access code will  in 90 days. If you need help or a new code, please call your [place] clinic or [phone].        Care EveryWhere ID     This is your Care EveryWhere ID. This could be used by other organizations to access your [place] medical records  [id number]        Your Vitals Were     Pulse Temperature Height BMI (Body Mass Index)          98 97  F (36.1  C) (Tympanic) 5' 5\" (1.651 m) 40.94 kg/m2         Blood Pressure from Last 3 Encounters:   10/12/17 111/75   08/10/17 110/80   17 130/81    Weight from Last 3 Encounters:   10/12/17 246 lb (111.6 kg)   08/10/17 235 lb (106.6 kg)   17 235 lb (106.6 kg) (>99 %)*     * Growth percentiles are based on [facility] 2-20 Years data.              Today, you had the following     No orders found for display         Today's Medication Changes          These changes are accurate as of: 10/12/17  2:59 PM.  If you have any questions, ask your nurse or doctor.               These medicines have changed or have updated prescriptions.        Dose/Directions    escitalopram 10 MG tablet   Commonly known as:  LEXAPRO   This may have changed:    - how much to take  - how to take this  - when to take this  - additional instructions   Used for:  THANG (generalized anxiety disorder), Dysthymia   Changed by:  Vijay Hammonds PA-C        Dose:  10 mg   Take 1 tablet (10 mg) by mouth daily   Quantity:  90 tablet   Refills:  1            Where to get your medicines      These medications were sent to [facility] - [address]  [address] [address] [address]     Phone:  [phone]     escitalopram 10 MG tablet         Some of these will need a paper prescription and others can be bought over the counter.  Ask your nurse if you have questions.     Bring a paper prescription for each of these medications     LORazepam 0.5 MG tablet                Primary Care Provider Office Phone # Fax #    Vijay Hammonds, " HAYDEE [phone]       [address] [facility] [address]        Equal Access to Services     NOE JEONG : Hadюлия ralph ovalle leni Bardales, waanandda luqgertrude, qakateta kabanda brandy, javon laiadriana eliz. So [facility] [phone].    ATENCIÓN: Si habla español, tiene a gore disposición servicios gratuitos de asistencia lingüística. Llame al [phone].    We comply with applicable federal civil rights laws and [place] laws. We do not discriminate on the basis of race, color, national origin, age, disability, sex, sexual orientation, or gender identity.            Thank you!     Thank you for choosing [facility]  for your care. Our goal is always to provide you with excellent care. Hearing back from our patients is one way we can continue to improve our services. Please take a few minutes to complete the written survey that you may receive in the mail after your visit with us. Thank you!             Your Updated Medication List - Protect others around you: Learn how to safely use, store and throw away your medicines at www.disposemymeds.org.          This list is accurate as of: 10/12/17  2:59 PM.  Always use your most recent med list.                   Brand Name Dispense Instructions for use Diagnosis    escitalopram 10 MG tablet    LEXAPRO    90 tablet    Take 1 tablet (10 mg) by mouth daily    THANG (generalized anxiety disorder), Dysthymia       ibuprofen 600 MG tablet    ADVIL/MOTRIN    120 tablet    Take 1 tablet by mouth every 6 hours as needed for pain.    Aortic stenosis       IRON SUPPLEMENT PO      Reported on 3/30/2017        levothyroxine 25 MCG tablet    SYNTHROID/LEVOTHROID    90 tablet    Take 1 tablet (25 mcg) by mouth daily    Hypothyroidism due to acquired atrophy of thyroid       LORazepam 0.5 MG tablet    ATIVAN    25 tablet    Take 0.5-1 tablets (0.25-0.5 mg) by mouth every 8 hours as needed for anxiety    THANG (generalized anxiety disorder)        norethindrone 0.35 MG per tablet    MICRONOR    90 tablet    Take 1 tablet (0.35 mg) by mouth daily    Dysmenorrhea

## 2017-10-12 NOTE — PROGRESS NOTES
SUBJECTIVE:   Cathy Galloway is a 22 year old female who presents to clinic today for the following health issues:      Anxiety Follow-Up    Status since last visit: better, taking 1 tablet daily,     Other associated symptoms:None    Complicating factors:   Significant life event: No   Current substance abuse: None  Depression symptoms: yes  THANG-7 SCORE 8/24/2017 10/12/2017   Total Score 19 3     PHQ-9 SCORE 10/30/2013 8/24/2017 10/12/2017   Total Score 0 - -   Total Score - 15 5       Doing great on all fronts, both thang and dysthymia.  She is attending counseling. Will only see her occasionally moving forward.     Problem list and histories reviewed & adjusted, as indicated.  Additional history: as documented    Patient Active Problem List   Diagnosis     Myopia     Obesity     Dysmenorrhea     Hypothyroidism due to acquired atrophy of thyroid     Supravalvar aortic stenosis     TMJ (temporomandibular joint syndrome)     Myofascial muscle pain     Cervicalgia     THANG (generalized anxiety disorder)     Depressive disorder     Past Surgical History:   Procedure Laterality Date     HEART CATH CHILD  8/20/2012    Procedure: HEART CATH CHILD;  Right and Left Heart Cath;  Surgeon: Esvin Merlos MD;  Location: [facility]     RELEASE TRIGGER FINGER       REPAIR VALVE AORTIC  10/8/2012    Procedure: REPAIR VALVE AORTIC;  Release Supravalvular Aortic Stenosis, medan sternotomy, trans-esophageal echocardiaogram done by Dr. Wills;  Surgeon: Seng Lan MD;  Location: [facility]       Social History   Substance Use Topics     Smoking status: Never Smoker     Smokeless tobacco: Never Used      Comment: Lives in smoke free household     Alcohol use No     Family History   Problem Relation Age of Onset     Hypertension Father      Cardiovascular Maternal Grandmother      HEART DISEASE Maternal Grandmother      Cardiovascular Paternal Grandmother      Lipids Paternal Grandmother      HEART DISEASE Paternal  Grandmother      Cardiovascular Brother      HEART DISEASE Brother      Cardiovascular Maternal Aunt      HEART DISEASE Maternal Aunt      Musculoskeletal Disorder Mother      back     CANCER No family hx of      DIABETES No family hx of      CEREBROVASCULAR DISEASE No family hx of      Thyroid Disease No family hx of      Glaucoma No family hx of      Macular Degeneration No family hx of          Current Outpatient Prescriptions   Medication Sig Dispense Refill     escitalopram (LEXAPRO) 10 MG tablet Take 1/2 tab daily for 1 week, then increase to 1 full tab daily thereafter 30 tablet 1     LORazepam (ATIVAN) 0.5 MG tablet Take 0.5-1 tablets (0.25-0.5 mg) by mouth every 8 hours as needed for anxiety 25 tablet 0     levothyroxine (SYNTHROID/LEVOTHROID) 25 MCG tablet Take 1 tablet (25 mcg) by mouth daily 90 tablet 3     Ferrous Sulfate (IRON SUPPLEMENT PO) Reported on 3/30/2017       ibuprofen (ADVIL,MOTRIN) 600 MG tablet Take 1 tablet by mouth every 6 hours as needed for pain. 120 tablet 0     norethindrone (MICRONOR) 0.35 MG per tablet Take 1 tablet (0.35 mg) by mouth daily 90 tablet 3     Recent Labs   Lab Test  03/30/17   1243  08/09/16   0908   10/13/12   0626  10/11/12   0641   07/26/11   0948   LDL   --    --    --    --    --    --   77   HDL   --    --    --    --    --    --   48*   TRIG   --    --    --    --    --    --   110   CR   --    --    --   0.82  0.71   < >   --    GFRESTIMATED   --    --    --   GFR not calculated, patient <16 years old.  GFR not calculated, patient <16 years old.   < >   --    GFRESTBLACK   --    --    --   GFR not calculated, patient <16 years old.  GFR not calculated, patient <16 years old.   < >   --    POTASSIUM   --    --    --   3.6  3.4   < >   --    TSH  2.31  2.36   < >   --    --    --   4.12    < > = values in this interval not displayed.      BP Readings from Last 3 Encounters:   10/12/17 111/75   08/10/17 110/80   06/08/17 130/81    Wt Readings from Last 3  Encounters:   10/12/17 246 lb (111.6 kg)   08/10/17 235 lb (106.6 kg)   06/08/17 235 lb (106.6 kg) (>99 %)*     * Growth percentiles are based on [facility] 2-20 Years data.                          Reviewed and updated as needed this visit by clinical staff       Reviewed and updated as needed this visit by Provider         All other systems negative except as outline above  OBJECTIVE:  Eye exam - right eye normal lid, conjunctiva, cornea, pupil and fundus, left eye normal lid, conjunctiva, cornea, pupil and fundus.  Thyroid not palpable, not enlarged, no nodules detected.  CHEST:chest clear to IPPA, no tachypnea, retractions or cyanosis and S1, S2 normal, no murmur, no gallop, rate regular.    Cathy was seen today for recheck.    Diagnoses and all orders for this visit:    THANG (generalized anxiety disorder)  -     LORazepam (ATIVAN) 0.5 MG tablet; Take 0.5-1 tablets (0.25-0.5 mg) by mouth every 8 hours as needed for anxiety  -     escitalopram (LEXAPRO) 10 MG tablet; Take 1 tablet (10 mg) by mouth daily    Dysthymia  -     escitalopram (LEXAPRO) 10 MG tablet; Take 1 tablet (10 mg) by mouth daily      work on lifestyle modification  Recheck in 6 mos.

## 2017-10-12 NOTE — NURSING NOTE
"Chief Complaint   Patient presents with     RECHECK       Initial /75  Pulse 98  Temp 97  F (36.1  C) (Tympanic)  Ht 5' 5\" (1.651 m)  Wt 246 lb (111.6 kg)  BMI 40.94 kg/m2 Estimated body mass index is 40.94 kg/(m^2) as calculated from the following:    Height as of this encounter: 5' 5\" (1.651 m).    Weight as of this encounter: 246 lb (111.6 kg).  Medication Reconciliation: complete   Dunia Lewis CMA    "

## 2017-10-13 ASSESSMENT — ANXIETY QUESTIONNAIRES: GAD7 TOTAL SCORE: 3

## 2017-10-24 ENCOUNTER — OFFICE VISIT (OUTPATIENT)
Dept: PSYCHOLOGY | Facility: CLINIC | Age: 22
End: 2017-10-24
Payer: COMMERCIAL

## 2017-10-24 DIAGNOSIS — F41.1 GENERALIZED ANXIETY DISORDER: Primary | ICD-10-CM

## 2017-10-24 PROCEDURE — 90834 PSYTX W PT 45 MINUTES: CPT | Performed by: COUNSELOR

## 2017-10-24 NOTE — PROGRESS NOTES
Progress Note    Client Name: Cathy Galloway  Date: 10/24/2017         Service Type: Individual      Session Start Time: 11:05am  Session End Time: 11:55am      Session Length: 50 minutes     Session #: 7     Attendees: Client attended alone    Treatment Plan Last Reviewed: 8/31/2017  PHQ-9 / THANG-7 : 8/24/2017     DATA      Progress Since Last Session (Related to Symptoms / Goals / Homework):   Symptoms: Improved    Homework: Partially completed      Episode of Care Goals: Satisfactory progress - ACTION (Actively working towards change); Intervened by reinforcing change plan / affirming steps taken     Current / Ongoing Stressors and Concerns:   Peer relationship stressors     Treatment Objective(s) Addressed in This Session:   use thought-stopping strategy daily to reduce intrusive thoughts  Feel less fidgety, restless or slow in daily activities / interpersonal interactions  compile a list of boundaries that they would like to set with others. Not taking on other people's problems  Cathy reported two interactions over a few years with her boyfriend's friend that she has known. She indicated that one happened last week, and the other happened a few years ago, but she fears that the friend remembers it, and holds it against her. She stated that she hopes that these interactions do not interfere with her relationship. The therapist challenged her as to why they would, but Cathy was unable to identify ways it would jeopardize her relationship. Worked on ways to limit the responsibility she takes on for other people's actions.     Intervention:   CBT: Thought stopping, challnging distressing thoughts  DBT: setting limits, interpersonal effectiveness        ASSESSMENT: Current Emotional / Mental Status (status of significant symptoms):   Risk status (Self / Other harm or suicidal ideation)   Client denies current fears or concerns for personal safety.   Client denies  current or recent suicidal ideation or behaviors.   Client denies current or recent homicidal ideation or behaviors.   Client denies current or recent self injurious behavior or ideation.   Client denies other safety concerns.   A safety and risk management plan has not been developed at this time, however client was given the after-hours number / 911 should there be a change in any of these risk factors.     Appearance:   Appropriate    Eye Contact:   Good    Psychomotor Behavior: Normal    Attitude:   Cooperative    Orientation:   All   Speech    Rate / Production: Normal     Volume:  Normal    Mood:    Normal   Affect:    Appropriate    Thought Content:  Clear    Thought Form:  Coherent  Goal Directed  Logical    Insight:    Fair      Medication Review:   No changes to current psychiatric medication(s)     Medication Compliance:   Yes     Changes in Health Issues:   None reported     Chemical Use Review:   Substance Use: Chemical use reviewed, no active concerns identified      Tobacco Use: No current tobacco use.       Collateral Reports Completed:   Not Applicable    PLAN: (Client Tasks / Therapist Tasks / Other)  Continue with individual therapy. Set next session for 3 weeks to assess progress on goals.        Lilibeth Cedeño, [facility]                                                         ________________________________________________________________________    Treatment Plan      Client's Name: Cathy Galloway                                           YOB: 1995      Date: 8/31/2017      DSM-V Diagnoses: 300.02 (F41.1) Generalized Anxiety Disorder  Psychosocial / Contextual Factors: stress of school starting, stress at work with customers  WHODAS: reviewed 8/24/2017      Referral / Collaboration:  Referral to another professional/service is not indicated at this time..      Anticipated number of session or this episode of care: 2          MeasurableTreatment Goal(s) related to  diagnosis / functional impairment(s)  Goal 1: Client will report a decrease in anxiety symptoms.    I will know I've met my goal when there is a decrease in anxiety attacks.        Objective #A (Client Action)                                    Client will identify 3 fears / thoughts that contribute to feeling anxious.  Status: New - Date: 8/31/2017       Intervention(s)  Therapist will provide the client with phone numbers for after-hours emergencies and instructions for how to contact the therapist.      Objective #B  Client will identify 3 initial signs or symptoms of anxiety.  Status: New - Date: 8/31/2017       Intervention(s)  Therapist will teach emotional recognition/identification. identifying location in her body where stress and anxiety manifest.      Objective #C  Client will use at least 2 coping skills for anxiety management in the next 1 weeks.  Status: New - Date: 8/31/2017       Intervention(s)  Therapist will teach self-soothe and 5 senses.          Goal 2: Client will identify thoughts and feelings that lead to anxiety.    I will know I've met my goal when I can identify triggers.        Objective #A (Client Action)                                    Status: New - Date: 8/31/2017       Client will use thought-stopping strategy daily to reduce intrusive thoughts.      Intervention(s)  Therapist will teach the client how to perform a behavioral chain analysis. Thought stopping techniques.          Goal 3: Client will utilize coping skills when needed to decrease anxiety attacks    I will know I've met my goal when decrease the frequency of anxiety attacks.        Objective #A (Client Action)                                    Status: New - Date: 8/31/2017       Client will use at least 3 coping skills for anxiety management in the next 1 weeks.      Intervention(s)  Therapist will help identify common coping skills to practice throughout the week.                  Client has reviewed and agreed to  the above plan.     Lilibeth Cedeño, [facility]  October 24, 2017

## 2018-01-25 ENCOUNTER — TELEPHONE (OUTPATIENT)
Dept: NURSING | Facility: CLINIC | Age: 23
End: 2018-01-25

## 2018-01-25 NOTE — TELEPHONE ENCOUNTER
Pt would like refill of anxiety medication, informed pt she has refill remaining at [facility]. Pt verbalized understanding and appreciation.

## 2018-04-18 PROCEDURE — 99207 ZZC NO CHARGE LOS: CPT | Performed by: COUNSELOR

## 2018-04-18 NOTE — Clinical Note
Sylvester Linares, I have not seen Cathy for counseling since October. Last I saw, she was doing well and was going to come back monthly. I have discharged her at this time and sent a letter to her. She is more than welcome to return in the future if she desires to do so. Thanks, Ruthie

## 2018-04-18 NOTE — PROGRESS NOTES
Discharge Summary  Multiple Sessions    Client Name: Cathy Galloway MRN#: [id number] YOB: 1995      Intake / Discharge Date: 8/24/2017 to 10/24/2-17      DSM5 Diagnoses: (Sustained by DSM5 Criteria Listed Above)  Diagnoses: 300.02 (F41.1) Generalized Anxiety Disorder  Psychosocial & Contextual Factors: peer relationships, family relationships, school challenges  WHODAS 2.0 (12 item) Score: 27          Presenting Concern:  Cathy was attending counseling due to concerns of generalized anxiety symptoms. She was finding it challenging to attend school, the demands of her work, and social/familial demands. She also started new medication around the time of treatment, which had some side effects that made her wary, but throughout the sessions, the side effects wore off. She also began a new relationship with a boyfriend which helped improve her socializing and decrease her anxiety.      Reason for Discharge:  Client is satisfied with progress      Disposition at Time of Last Encounter:   Comments:   Cathy appeared to have made improvements and was satisfied with her progress in treatment. She planned to return at the last session on a monthly basis, but did not schedule any follow up appointments.     Risk Management:   Client denies a history of suicidal ideation, suicide attempts, self-injurious behavior, homicidal ideation, homicidal behavior and and other safety concerns  A safety and risk management plan has not been developed at this time, however client was given the after-hours number / 911 should there be a change in any of these risk factors.      Referred To:  PCP for medication management. Cathy is welcome to return to [facility] in the future if needed.        Lilibeth Cedeño, [facility]   4/18/2018

## 2018-05-16 DIAGNOSIS — F34.1 DYSTHYMIA: ICD-10-CM

## 2018-05-16 DIAGNOSIS — F41.1 GAD (GENERALIZED ANXIETY DISORDER): ICD-10-CM

## 2018-05-16 NOTE — LETTER
May 17, 2018        Cathy Galloway  [address] [address] [address]      Dear Cathy,    Your medication has been approved for escitalopram (LEXAPRO) 10 MG tablet  for one month only.    However, you are due for a follow up appointment for further refills. Please schedule this visit at your earliest convenience.    Thank you.      Vijay Hammonds PA-C/rajesh

## 2018-05-16 NOTE — TELEPHONE ENCOUNTER
Routing refill request to provider for review/approval because:  PHQ-9 SCORE 10/30/2013 8/24/2017 10/12/2017   Total Score 0 - -   Total Score - 15 5   due for appt and update.  Pended for approval with reminder

## 2018-05-16 NOTE — TELEPHONE ENCOUNTER
escitalopram (LEXAPRO) 10mg      Last Written Prescription Date:  10/12/17  Last Fill Quantity: 90,   # refills: 1  Last Office Visit: 03/30/18  Future Office visit:       Thank You,  [place], Pharmacy Tech  [place]

## 2018-05-17 RX ORDER — ESCITALOPRAM OXALATE 10 MG/1
10 TABLET ORAL DAILY
Qty: 30 TABLET | Refills: 0 | Status: SHIPPED | OUTPATIENT
Start: 2018-05-17 | End: 2018-07-17

## 2018-07-17 ENCOUNTER — OFFICE VISIT (OUTPATIENT)
Dept: FAMILY MEDICINE | Facility: CLINIC | Age: 23
End: 2018-07-17
Payer: COMMERCIAL

## 2018-07-17 VITALS
OXYGEN SATURATION: 96 % | BODY MASS INDEX: 43.99 KG/M2 | DIASTOLIC BLOOD PRESSURE: 76 MMHG | HEART RATE: 67 BPM | WEIGHT: 264 LBS | HEIGHT: 65 IN | RESPIRATION RATE: 18 BRPM | SYSTOLIC BLOOD PRESSURE: 116 MMHG

## 2018-07-17 DIAGNOSIS — Z30.018 ENCOUNTER FOR INITIAL PRESCRIPTION OF OTHER CONTRACEPTIVES: Primary | ICD-10-CM

## 2018-07-17 DIAGNOSIS — F34.1 DYSTHYMIA: ICD-10-CM

## 2018-07-17 DIAGNOSIS — E03.4 HYPOTHYROIDISM DUE TO ACQUIRED ATROPHY OF THYROID: ICD-10-CM

## 2018-07-17 DIAGNOSIS — F41.1 GAD (GENERALIZED ANXIETY DISORDER): ICD-10-CM

## 2018-07-17 LAB — TSH SERPL DL<=0.005 MIU/L-ACNC: 4.49 MU/L (ref 0.4–4)

## 2018-07-17 PROCEDURE — 99213 OFFICE O/P EST LOW 20 MIN: CPT | Performed by: PHYSICIAN ASSISTANT

## 2018-07-17 PROCEDURE — 84443 ASSAY THYROID STIM HORMONE: CPT | Performed by: PHYSICIAN ASSISTANT

## 2018-07-17 PROCEDURE — 36415 COLL VENOUS BLD VENIPUNCTURE: CPT | Performed by: PHYSICIAN ASSISTANT

## 2018-07-17 RX ORDER — ESCITALOPRAM OXALATE 10 MG/1
10 TABLET ORAL DAILY
Qty: 90 TABLET | Refills: 3 | Status: SHIPPED | OUTPATIENT
Start: 2018-07-17 | End: 2019-09-03

## 2018-07-17 RX ORDER — LEVOTHYROXINE SODIUM 25 UG/1
25 TABLET ORAL DAILY
Qty: 90 TABLET | Refills: 3 | Status: SHIPPED | OUTPATIENT
Start: 2018-07-17 | End: 2018-07-18

## 2018-07-17 ASSESSMENT — ANXIETY QUESTIONNAIRES
2. NOT BEING ABLE TO STOP OR CONTROL WORRYING: NOT AT ALL
5. BEING SO RESTLESS THAT IT IS HARD TO SIT STILL: SEVERAL DAYS
IF YOU CHECKED OFF ANY PROBLEMS ON THIS QUESTIONNAIRE, HOW DIFFICULT HAVE THESE PROBLEMS MADE IT FOR YOU TO DO YOUR WORK, TAKE CARE OF THINGS AT HOME, OR GET ALONG WITH OTHER PEOPLE: NOT DIFFICULT AT ALL
7. FEELING AFRAID AS IF SOMETHING AWFUL MIGHT HAPPEN: NOT AT ALL
GAD7 TOTAL SCORE: 4
1. FEELING NERVOUS, ANXIOUS, OR ON EDGE: SEVERAL DAYS
3. WORRYING TOO MUCH ABOUT DIFFERENT THINGS: NOT AT ALL
6. BECOMING EASILY ANNOYED OR IRRITABLE: SEVERAL DAYS

## 2018-07-17 ASSESSMENT — PATIENT HEALTH QUESTIONNAIRE - PHQ9: 5. POOR APPETITE OR OVEREATING: SEVERAL DAYS

## 2018-07-17 NOTE — PROGRESS NOTES
SUBJECTIVE:   Cathy Galloway is a 23 year old female who presents to clinic today for the following health issues:      Depression Followup    Status since last visit: Stable refill medication today please    See PHQ-9 for current symptoms.  Other associated symptoms: None    Complicating factors:   Significant life event:  No   Current substance abuse:  None  Anxiety or Panic symptoms:  No    PHQ-9 8/24/2017 10/12/2017   Total Score 15 5   Q9: Suicide Ideation Several days Not at all     .  PHQ-9  English  PHQ-9   Any Language  Suicide Assessment Five-step Evaluation and Treatment (SAFE-T)    Amount of exercise or physical activity: None    Problems taking medications regularly: No    Medication side effects: none    Diet: regular (no restrictions)      No depression . Anxiety well controlled. Sleeping well.         Problem list and histories reviewed & adjusted, as indicated.  Additional history: as documented    BP Readings from Last 3 Encounters:   07/17/18 116/76   10/12/17 111/75   08/10/17 110/80    Wt Readings from Last 3 Encounters:   07/17/18 264 lb (119.7 kg)   10/12/17 246 lb (111.6 kg)   08/10/17 235 lb (106.6 kg)                    Reviewed and updated as needed this visit by clinical staff  Tobacco  Allergies  Meds  Problems  Med Hx  Surg Hx  Fam Hx  Soc Hx        Reviewed and updated as needed this visit by Provider  Tobacco  Allergies  Meds  Problems  Med Hx  Surg Hx  Fam Hx  Soc Hx          All other systems negative except as outline above  OBJECTIVE:  Eye exam - right eye normal lid, conjunctiva, cornea, pupil and fundus, left eye normal lid, conjunctiva, cornea, pupil and fundus.  Thyroid not palpable, not enlarged, no nodules detected.  CHEST:chest clear to IPPA, no tachypnea, retractions or cyanosis and S1, S2 normal, no murmur, no gallop, rate regular.    Cathy was seen today for depression.    Diagnoses and all orders for this visit:    Encounter for initial  prescription of other contraceptives  -     OB/GYN REFERRAL    THANG (generalized anxiety disorder)  -     escitalopram (LEXAPRO) 10 MG tablet; Take 1 tablet (10 mg) by mouth daily    Dysthymia  -     escitalopram (LEXAPRO) 10 MG tablet; Take 1 tablet (10 mg) by mouth daily    Hypothyroidism due to acquired atrophy of thyroid  -     levothyroxine (SYNTHROID/LEVOTHROID) 25 MCG tablet; Take 1 tablet (25 mcg) by mouth daily  -     TSH    Other orders  -     DEPRESSION ACTION PLAN (DAP)      work on lifestyle modification  Recheck in 6 mos

## 2018-07-17 NOTE — LETTER
July 18, 2018      Cathy [place]RAVO [place]  [address] [address] [address]        Dear ,    We are writing to inform you of your test results.    Your tsh came back a little elevated. As a result, I'm increasing your levothyroxine dose to 1.5 tabs daily. I'd like another tsh rechecked in 8 wks.     Resulted Orders   TSH   Result Value Ref Range    TSH 4.49 (H) 0.40 - 4.00 mU/L       If you have any questions or concerns, please call the clinic at the number listed above.       Sincerely,        Vijay Hammonds PA-C/rajesh

## 2018-07-17 NOTE — MR AVS SNAPSHOT
"              After Visit Summary   2018    Cathy Galloway    MRN: [id number]           Patient Information     Date Of Birth          1995        Visit Information        Provider Department      2018 9:40 AM Vijay Hammonds PA-C [facility] [place]        Today's Diagnoses     THANG (generalized anxiety disorder)        Dysthymia        Hypothyroidism due to acquired atrophy of thyroid           Follow-ups after your visit        Who to contact     Normal or non-critical lab and imaging results will be communicated to you by [employer]hart, letter or phone within 4 business days after the clinic has received the results. If you do not hear from us within 7 days, please contact the clinic through [employer]hart or phone. If you have a critical or abnormal lab result, we will notify you by phone as soon as possible.  Submit refill requests through [employer] or call your pharmacy and they will forward the refill request to us. Please allow 3 business days for your refill to be completed.          If you need to speak with a  for additional information , please call: [phone]             Additional Information About Your Visit        [employer] Information     [employer] lets you send messages to your doctor, view your test results, renew your prescriptions, schedule appointments and more. To sign up, go to [email or web address] . Click on \"Log in\" on the left side of the screen, which will take you to the Welcome page. Then click on \"Sign up Now\" on the right side of the page.     You will be asked to enter the access code listed below, as well as some personal information. Please follow the directions to create your username and password.     Your access code is: [email or web address]  Expires: 10/15/2018 10:27 AM     Your access code will  in 90 days. If you need help or a new code, please call your [place] clinic or [phone].        Care EveryWhere ID     This is your Care EveryWhere " "ID. This could be used by other organizations to access your [place] medical records  [id number]        Your Vitals Were     Pulse Respirations Height Pulse Oximetry BMI (Body Mass Index)       67 18 5' 5\" (1.651 m) 96% 43.93 kg/m2        Blood Pressure from Last 3 Encounters:   07/17/18 116/76   10/12/17 111/75   08/10/17 110/80    Weight from Last 3 Encounters:   07/17/18 264 lb (119.7 kg)   10/12/17 246 lb (111.6 kg)   08/10/17 235 lb (106.6 kg)              We Performed the Following     DEPRESSION ACTION PLAN (DAP)     TSH          Where to get your medicines      These medications were sent to [facility] - [address]  [address] [facility] [address]     Phone:  [phone]     escitalopram 10 MG tablet    levothyroxine 25 MCG tablet          Primary Care Provider Office Phone # Fax #    Vijay Hammonds PA-C [phone]       [address]  [place] [address]        Equal Access to Services     [facility]: Hadii aad ku hadasho Soomaali, waaxda luqadaha, qaybta kaalmada adeegyada, waxay idiin hayaan adefabian blake . So [facility] [phone].    ATENCIÓN: Si habla español, tiene a gore disposición servicios gratuitos de asistencia lingüística. [facility] [phone].    We comply with applicable federal civil rights laws and [place] laws. We do not discriminate on the basis of race, color, national origin, age, disability, sex, sexual orientation, or gender identity.            Thank you!     Thank you for choosing [facility]  for your care. Our goal is always to provide you with excellent care. Hearing back from our patients is one way we can continue to improve our services. Please take a few minutes to complete the written survey that you may receive in the mail after your visit with us. Thank you!             Your Updated Medication List - Protect others around you: Learn how to safely use, store and throw away your " medicines at www.disposemymeds.org.          This list is accurate as of 7/17/18 10:27 AM.  Always use your most recent med list.                   Brand Name Dispense Instructions for use Diagnosis    escitalopram 10 MG tablet    LEXAPRO    90 tablet    Take 1 tablet (10 mg) by mouth daily    TAHNG (generalized anxiety disorder), Dysthymia       ibuprofen 600 MG tablet    ADVIL/MOTRIN    120 tablet    Take 1 tablet by mouth every 6 hours as needed for pain.    Aortic stenosis       IRON SUPPLEMENT PO      Reported on 3/30/2017        levothyroxine 25 MCG tablet    SYNTHROID/LEVOTHROID    90 tablet    Take 1 tablet (25 mcg) by mouth daily    Hypothyroidism due to acquired atrophy of thyroid       LORazepam 0.5 MG tablet    ATIVAN    25 tablet    Take 0.5-1 tablets (0.25-0.5 mg) by mouth every 8 hours as needed for anxiety    THANG (generalized anxiety disorder)       norethindrone 0.35 MG per tablet    MICRONOR    90 tablet    Take 1 tablet (0.35 mg) by mouth daily    Dysmenorrhea

## 2018-07-18 RX ORDER — LEVOTHYROXINE SODIUM 25 UG/1
37.5 TABLET ORAL DAILY
Qty: 135 TABLET | Refills: 3 | Status: SHIPPED | OUTPATIENT
Start: 2018-07-18 | End: 2019-09-03

## 2018-07-18 ASSESSMENT — PATIENT HEALTH QUESTIONNAIRE - PHQ9: SUM OF ALL RESPONSES TO PHQ QUESTIONS 1-9: 5

## 2018-07-18 ASSESSMENT — ANXIETY QUESTIONNAIRES: GAD7 TOTAL SCORE: 4

## 2018-07-24 DIAGNOSIS — Q25.3 SUPRAVALVAR AORTIC STENOSIS: Primary | ICD-10-CM

## 2018-08-05 ENCOUNTER — HEALTH MAINTENANCE LETTER (OUTPATIENT)
Age: 23
End: 2018-08-05

## 2018-08-21 ENCOUNTER — TELEPHONE (OUTPATIENT)
Dept: FAMILY MEDICINE | Facility: CLINIC | Age: 23
End: 2018-08-21

## 2018-08-21 NOTE — TELEPHONE ENCOUNTER
Panel Management Review      Patient has the following on her problem list: None      Composite cancer screening  Chart review shows that this patient is due/due soon for the following Pap Smear  Summary:    Patient is due/failing the following:   PAP and PHYSICAL    Action needed:   Patient needs office visit for physical and pap.    Type of outreach:    Sent letter.    Questions for provider review:    None                                                                                                                                    Inge Jin CMA     Chart routed to none.

## 2018-08-21 NOTE — LETTER
August 21, 2018      Cathy Galloway  [address] [address]        Dear Cathy,     As part of [facility] commitment to health and wellness we have reviewed your chart and it indicates that you are due for one or more of the following:    -- Pap smear. These are recommended every 3 years. Please call our clinic to schedule your pap smear / physical appointment.     Please try to schedule and/or complete the tests above within the next 2-4 weeks.   The number to call to schedule an appointment at [facility] is [phone].    While we work hard to maintain accurate records, it is always possible that this notice does not accurately reflect tests that you may have had. To ensure that we do not send you unnecessary notices please verify that we have accurate dates of your tests, even if these were done many years ago.     Thank you for choosing [facility] for your healthcare needs.      Sincerely,     Vijay Hammonds PA-C/  [facility] Care Team

## 2018-08-23 ENCOUNTER — RADIANT APPOINTMENT (OUTPATIENT)
Dept: CARDIOLOGY | Facility: CLINIC | Age: 23
End: 2018-08-23
Attending: PEDIATRICS
Payer: COMMERCIAL

## 2018-08-23 ENCOUNTER — OFFICE VISIT (OUTPATIENT)
Dept: CARDIOLOGY | Facility: CLINIC | Age: 23
End: 2018-08-23
Payer: COMMERCIAL

## 2018-08-23 VITALS
DIASTOLIC BLOOD PRESSURE: 86 MMHG | BODY MASS INDEX: 44.7 KG/M2 | RESPIRATION RATE: 24 BRPM | HEIGHT: 65 IN | SYSTOLIC BLOOD PRESSURE: 125 MMHG | OXYGEN SATURATION: 98 % | WEIGHT: 268.3 LBS | HEART RATE: 99 BPM

## 2018-08-23 DIAGNOSIS — Q25.3 SUPRAVALVAR AORTIC STENOSIS: Primary | ICD-10-CM

## 2018-08-23 DIAGNOSIS — Q25.3 SUPRAVALVAR AORTIC STENOSIS: ICD-10-CM

## 2018-08-23 PROBLEM — E66.01 MORBID OBESITY (H): Status: ACTIVE | Noted: 2018-08-23

## 2018-08-23 PROCEDURE — 93325 DOPPLER ECHO COLOR FLOW MAPG: CPT

## 2018-08-23 PROCEDURE — 93320 DOPPLER ECHO COMPLETE: CPT

## 2018-08-23 PROCEDURE — 93303 ECHO TRANSTHORACIC: CPT

## 2018-08-23 PROCEDURE — 99213 OFFICE O/P EST LOW 20 MIN: CPT | Mod: 25 | Performed by: PEDIATRICS

## 2018-08-23 NOTE — MR AVS SNAPSHOT
After Visit Summary   8/23/2018    Cathy Galloway    MRN: [id number]           Patient Information     Date Of Birth          1995        Visit Information        Provider Department      8/23/2018 3:40 PM Esvin Merlos MD [facility]        Today's Diagnoses     Supravalvar aortic stenosis    -  1    Morbid obesity (H)          Care Instructions    Thank you for choosing [facility]. It was a pleasure to see you for your office visit today.     To reach our Specialty Clinic: [phone]  To reach our Imaging scheduler: [phone]      If you had any blood work, imaging or other tests:  Normal test results will be mailed to your home address in a letter  Abnormal results will be communicated to you via phone call/letter  Please allow up to 1-2 weeks for processing/interpretation of most lab work  If you have questions or concerns call our clinic at [phone]            Follow-ups after your visit        Follow-up notes from your care team     Return in about 1 year (around 8/23/2019).      Who to contact     If you have questions or need follow up information about today's clinic visit or your schedule please contact [facility] directly at [phone].  Normal or non-critical lab and imaging results will be communicated to you by MyChart, letter or phone within 4 business days after the clinic has received the results. If you do not hear from us within 7 days, please contact the clinic through Image Metricshart or phone. If you have a critical or abnormal lab result, we will notify you by phone as soon as possible.  Submit refill requests through [email or web address] or call your pharmacy and they will forward the refill request to us. Please allow 3 business days for your refill to be completed.          Additional Information About Your Visit        Image Metricshargamigo Information     [email or web address] is an electronic gateway that provides easy, online access to your  "medical records. With [employer] (QEEP), you can request a clinic appointment, read your test results, renew a prescription or communicate with your care team.     To sign up for [employer] (QEEP) visit the website at [email or web address]   You will be asked to enter the access code listed below, as well as some personal information. Please follow the directions to create your username and password.     Your access code is: [email or web address]  Expires: 10/15/2018 10:27 AM     Your access code will  in 90 days. If you need help or a new code, please contact your [facility] or call [phone] for assistance.        Care EveryWhere ID     This is your Care EveryWhere ID. This could be used by other organizations to access your [place] medical records  [id number]        Your Vitals Were     Pulse Respirations Height Pulse Oximetry BMI (Body Mass Index)       99 24 1.652 m (5' 5.04\") 98% 44.59 kg/m2        Blood Pressure from Last 3 Encounters:   18 125/86   18 116/76   10/12/17 111/75    Weight from Last 3 Encounters:   18 121.7 kg (268 lb 4.8 oz)   18 119.7 kg (264 lb)   10/12/17 111.6 kg (246 lb)              Today, you had the following     No orders found for display       Primary Care Provider Office Phone # Fax #    Vijay Hammonds PA-C [phone]       [address] [facility] [address]        Equal Access to Services     [facility]: Hadii aad ku hadasho Soomaali, waaxda luqadaha, qaybta kaalmada adeegyasruthi, javon blake . So [facility] [phone].    ATENCIÓN: Si habla español, tiene a gore disposición servicios gratuitos de asistencia lingüística. Llame al [phone].    We comply with applicable federal civil rights laws and [place] laws. We do not discriminate on the basis of race, color, national origin, age, disability, sex, sexual orientation, or gender identity.            Thank you!     Thank you for choosing [facility]  for your care. Our goal is always to provide you with excellent care. Hearing back from our patients is one way we can continue to improve our services. Please take a few minutes to complete the written survey that you may receive in the mail after your visit with us. Thank you!             Your Updated Medication List - Protect others around you: Learn how to safely use, store and throw away your medicines at www.disposemymeds.org.          This list is accurate as of 8/23/18  5:12 PM.  Always use your most recent med list.                   Brand Name Dispense Instructions for use Diagnosis    escitalopram 10 MG tablet    LEXAPRO    90 tablet    Take 1 tablet (10 mg) by mouth daily    THANG (generalized anxiety disorder), Dysthymia       ibuprofen 600 MG tablet    ADVIL/MOTRIN    120 tablet    Take 1 tablet by mouth every 6 hours as needed for pain.    Aortic stenosis       IRON SUPPLEMENT PO      Reported on 3/30/2017        levothyroxine 25 MCG tablet    SYNTHROID/LEVOTHROID    135 tablet    Take 1.5 tablets (37.5 mcg) by mouth daily    Hypothyroidism due to acquired atrophy of thyroid       LORazepam 0.5 MG tablet    ATIVAN    25 tablet    Take 0.5-1 tablets (0.25-0.5 mg) by mouth every 8 hours as needed for anxiety    THANG (generalized anxiety disorder)       norethindrone 0.35 MG per tablet    MICRONOR    90 tablet    Take 1 tablet (0.35 mg) by mouth daily    Dysmenorrhea

## 2018-08-23 NOTE — PROGRESS NOTES
"                                               PEDS Cardiac Consult Letter  Date: 2018      Vijay Hammonds PA-C  [address] [address]      PATIENT: Cathy Galloway  :          1995   HEIDI:          2018    Dear Dr. Hammonds:    Cathy is 23 years old and was seen at the [facility] on 2018.   She was born with supravalvar aortic stenosis.  In  surgical repair was performed.  Since that time she has been asymptomatic.  She has not experienced any syncope or chest pains.  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  Her mother, who also had supravalvar aortic stenosis,  suddenly 1 month ago.  She was thought to have sleep apnea.    On physical examination her height was 5' 5.04\" (1.652 m) and her weight was 268 lb 4.8 oz (121.7 kg).  Her heart rate was 99  and respirations 24 per minute.  The blood pressure in her right arm was 125/86.  She was acyanotic, warm and well perfused. She was alert cooperative and in no distress.  Her lungs were clear to auscultation without respiratory distress.  She had a regular rhythm with a grade 1-2/6 systolic ejection murmur at the mid left sternal border.  The second heart sound was physiologically split with a normal pulmonary component.   There was no organomegaly or abdominal tenderness.  Peripheral pulses were 2+ and equal in all extremities.  There was no clubbing or edema.    An echocardiogram performed today that I personally reviewed and explained to her showed a mean gradient of 6 mmHg through her ascending aorta.  Left ventricular wall thickness continued to be increased at 12 mm.    Cathy has mild supravalvar aortic stenosis after successful surgical repair.  She does not need restriction of her activities and I encouraged her to consider losing weight.  I would like to see her in follow-up in 2 years with an echocardiogram.    Thank you very much for your confidence in allowing " me to participate in Cathy's care.  If you have any questions or concerns, please don't hesitate to contact me.    Sincerely,      Esvin Merlos M.D.   Pediatric Cardiology   [facility]  Pediatric Specialty Clinic  [phone]    Note: Chart documentation done in part with Dragon Voice Recognition software. Although reviewed after completion, some word and grammatical errors may remain.

## 2018-08-23 NOTE — LETTER
"  2018      RE: Cathy Galloway  [address]  [place] [address]                                                      PEDS Cardiac Consult Letter  Date: 2018      Vijay Hammonds PA-C  [address] [place] [address]      PATIENT: Cathy Galloway  :          1995   HEIDI:          2018    Dear Dr. Hammonds:    Cathy is 23 years old and was seen at the [facility] on 2018.   She was born with supravalvar aortic stenosis.  In  surgical repair was performed.  Since that time she has been asymptomatic.  She has not experienced any syncope or chest pains.  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  Her mother, who also had supravalvar aortic stenosis,  suddenly 1 month ago.  She was thought to have sleep apnea.    On physical examination her height was 5' 5.04\" (1.652 m) and her weight was 268 lb 4.8 oz (121.7 kg).  Her heart rate was 99  and respirations 24 per minute.  The blood pressure in her right arm was 125/86.  She was acyanotic, warm and well perfused. She was alert cooperative and in no distress.  Her lungs were clear to auscultation without respiratory distress.  She had a regular rhythm with a grade 1-2/6 systolic ejection murmur at the mid left sternal border.  The second heart sound was physiologically split with a normal pulmonary component.   There was no organomegaly or abdominal tenderness.  Peripheral pulses were 2+ and equal in all extremities.  There was no clubbing or edema.    An echocardiogram performed today that I personally reviewed and explained to her showed a mean gradient of 6 mmHg through her ascending aorta.  Left ventricular wall thickness continued to be increased at 12 mm.    Cathy has mild supravalvar aortic stenosis after successful surgical repair.  She does not need restriction of her activities and I encouraged her to consider losing weight.  I would like to see her in " follow-up in 2 years with an echocardiogram.    Thank you very much for your confidence in allowing me to participate in Cathy's care.  If you have any questions or concerns, please don't hesitate to contact me.    Sincerely,      Esvin Merlos M.D.   Pediatric Cardiology   [facility]  Pediatric Specialty Clinic  [phone]    Note: Chart documentation done in part with Dragon Voice Recognition software. Although reviewed after completion, some word and grammatical errors may remain.     Esvin Merlos MD

## 2018-08-23 NOTE — PATIENT INSTRUCTIONS
Thank you for choosing [facility]. It was a pleasure to see you for your office visit today.     To reach our Specialty Clinic: [phone]  To reach our Imaging scheduler: [phone]      If you had any blood work, imaging or other tests:  Normal test results will be mailed to your home address in a letter  Abnormal results will be communicated to you via phone call/letter  Please allow up to 1-2 weeks for processing/interpretation of most lab work  If you have questions or concerns call our clinic at [phone]

## 2018-08-23 NOTE — NURSING NOTE
"Cathy Galloway's goals for this visit include: f/u from 2016 - Supravalvar aortic stenosis  She requests these members of her care team be copied on today's visit information: yes    PCP: Vijay Hammonds    Referring Provider:  Vijay Hammonds PA-C  [address] [place] [address]    /86 (BP Location: Right arm, Patient Position: Sitting, Cuff Size: Adult Large)  Pulse 99  Resp 24  Ht 1.652 m (5' 5.04\")  Wt 121.7 kg (268 lb 4.8 oz)  SpO2 98%  BMI 44.59 kg/m2        "

## 2018-08-26 ENCOUNTER — HEALTH MAINTENANCE LETTER (OUTPATIENT)
Age: 23
End: 2018-08-26

## 2018-09-16 ENCOUNTER — HEALTH MAINTENANCE LETTER (OUTPATIENT)
Age: 23
End: 2018-09-16

## 2019-09-03 ENCOUNTER — OFFICE VISIT (OUTPATIENT)
Dept: FAMILY MEDICINE | Facility: CLINIC | Age: 24
End: 2019-09-03
Payer: COMMERCIAL

## 2019-09-03 VITALS
OXYGEN SATURATION: 96 % | WEIGHT: 250 LBS | BODY MASS INDEX: 41.55 KG/M2 | SYSTOLIC BLOOD PRESSURE: 115 MMHG | HEART RATE: 110 BPM | RESPIRATION RATE: 18 BRPM | DIASTOLIC BLOOD PRESSURE: 74 MMHG

## 2019-09-03 DIAGNOSIS — E03.4 HYPOTHYROIDISM DUE TO ACQUIRED ATROPHY OF THYROID: ICD-10-CM

## 2019-09-03 DIAGNOSIS — M54.50 CHRONIC MIDLINE LOW BACK PAIN WITHOUT SCIATICA: Primary | ICD-10-CM

## 2019-09-03 DIAGNOSIS — F41.1 GAD (GENERALIZED ANXIETY DISORDER): ICD-10-CM

## 2019-09-03 DIAGNOSIS — F34.1 DYSTHYMIA: ICD-10-CM

## 2019-09-03 DIAGNOSIS — G89.29 CHRONIC MIDLINE LOW BACK PAIN WITHOUT SCIATICA: Primary | ICD-10-CM

## 2019-09-03 LAB — TSH SERPL DL<=0.005 MIU/L-ACNC: 2.32 MU/L (ref 0.4–4)

## 2019-09-03 PROCEDURE — 99214 OFFICE O/P EST MOD 30 MIN: CPT | Performed by: PHYSICIAN ASSISTANT

## 2019-09-03 PROCEDURE — 36415 COLL VENOUS BLD VENIPUNCTURE: CPT | Performed by: PHYSICIAN ASSISTANT

## 2019-09-03 PROCEDURE — 84443 ASSAY THYROID STIM HORMONE: CPT | Performed by: PHYSICIAN ASSISTANT

## 2019-09-03 RX ORDER — DICLOFENAC SODIUM 75 MG/1
75 TABLET, DELAYED RELEASE ORAL 2 TIMES DAILY
Qty: 30 TABLET | Refills: 0 | Status: SHIPPED | OUTPATIENT
Start: 2019-09-03 | End: 2021-02-17

## 2019-09-03 RX ORDER — LORAZEPAM 0.5 MG/1
.25-.5 TABLET ORAL EVERY 8 HOURS PRN
Qty: 25 TABLET | Refills: 0 | Status: SHIPPED | OUTPATIENT
Start: 2019-09-03 | End: 2021-01-11

## 2019-09-03 RX ORDER — ESCITALOPRAM OXALATE 10 MG/1
10 TABLET ORAL DAILY
Qty: 90 TABLET | Refills: 3 | Status: SHIPPED | OUTPATIENT
Start: 2019-09-03 | End: 2020-10-19

## 2019-09-03 RX ORDER — LEVOTHYROXINE SODIUM 25 UG/1
37.5 TABLET ORAL DAILY
Qty: 135 TABLET | Refills: 3 | Status: SHIPPED | OUTPATIENT
Start: 2019-09-03 | End: 2020-10-19

## 2019-09-03 NOTE — PROGRESS NOTES
Subjective     Cathy Galloway is a 24 year old female who presents to clinic today for the following health issues:    HPI   Anxiety Follow-Up    How are you doing with your anxiety since your last visit?      Are you having other symptoms that might be associated with anxiety? No    Have you had a significant life event? Grief or Loss     Are you feeling depressed? No    Do you have any concerns with your use of alcohol or other drugs? No  She denies depression symptoms.   Working as a manager at work.     She's been taking only one tab of her levothyroxine daily. She's supposed to be taking 1.5 tabs daily.   Social History     Tobacco Use     Smoking status: Never Smoker     Smokeless tobacco: Never Used     Tobacco comment: Lives in smoke free household   Substance Use Topics     Alcohol use: No     Drug use: No     THANG-7 SCORE 8/24/2017 10/12/2017 7/17/2018   Total Score 19 3 4     PHQ 8/24/2017 10/12/2017 7/17/2018   PHQ-9 Total Score 15 5 5   Q9: Thoughts of better off dead/self-harm past 2 weeks Several days Not at all Not at all         Hypothyroidism Follow-up      Since last visit, patient describes the following symptoms: Weight stable, no hair loss, no skin changes, no constipation, no loose stools        How many servings of fruits and vegetables do you eat daily?  2-3    On average, how many sweetened beverages do you drink each day (soda, juice, sweet tea, etc)?   1    How many days per week do you miss taking your medication? 0        Back Pain --fell 03/2019      Duration: 03/2019        Specific cause: fall --march 2019    Description:   Location of pain: low back bilateral  Character of pain: sharp and dull ache  Pain radiation:none  New numbness or weakness in legs, not attributed to pain:  no     Intensity: moderate    History:   Pain interferes with job: YES  History of back problems: no prior back problems  Any previous MRI or X-rays: None  Sees a specialist for back pain:  No  Therapies  tried without relief: none    Alleviating factors:   Improved by:       Precipitating factors:  Worsened by: Lifting, Bending, Standing,           Accompanying Signs & Symptoms:  Risk of Fracture:  None  Risk of Cauda Equina:  None  Risk of Infection:  None  Risk of Cancer:  None  Risk of Ankylosing Spondylitis:  Onset at age <35, male, AND morning back stiffness. no       Comes and goes. No radicular pain symptoms.              No Known Allergies  BP Readings from Last 3 Encounters:   09/03/19 115/74   08/23/18 125/86   07/17/18 116/76    Wt Readings from Last 3 Encounters:   09/03/19 113.4 kg (250 lb)   08/23/18 121.7 kg (268 lb 4.8 oz)   07/17/18 119.7 kg (264 lb)                      Reviewed and updated as needed this visit by Provider         Review of Systems   ROS COMP: Constitutional, HEENT, cardiovascular, pulmonary, GI, , musculoskeletal, neuro, skin, endocrine and psych systems are negative, except as otherwise noted.      Objective    /74   Pulse 110   Resp 18   Wt 113.4 kg (250 lb)   SpO2 96%   BMI 41.55 kg/m    Body mass index is 41.55 kg/m .  Physical Exam   Eye exam - right eye normal lid, conjunctiva, cornea, pupil and fundus, left eye normal lid, conjunctiva, cornea, pupil and fundus.  Thyroid not palpable, not enlarged, no nodules detected.  CHEST:chest clear to IPPA, no tachypnea, retractions or cyanosis and S1, S2 normal, no murmur, no gallop, rate regular.  BACK: Lumbosacral spine area reveals local tenderness.  Painful and reduced LS ROM noted. Straight leg raise is neg DTR's, motor strength and sensation normal, including heel and toe gait.  Perifpheral pulses are palpable.  Hipes and knees have full range of motion without pain.  No abdominal tenderness, mass or organomegaly.      Cathy was seen today for thyroid problem, anxiety and back pain.    Diagnoses and all orders for this visit:    Chronic midline low back pain without sciatica  -     [facility] PT, HAND, AND CHIROPRACTIC  REFERRAL; Future  -     diclofenac (VOLTAREN) 75 MG EC tablet; Take 1 tablet (75 mg) by mouth 2 times daily    THANG (generalized anxiety disorder)  -     escitalopram (LEXAPRO) 10 MG tablet; Take 1 tablet (10 mg) by mouth daily  -     LORazepam (ATIVAN) 0.5 MG tablet; Take 0.5-1 tablets (0.25-0.5 mg) by mouth every 8 hours as needed for anxiety    Dysthymia  -     escitalopram (LEXAPRO) 10 MG tablet; Take 1 tablet (10 mg) by mouth daily    Hypothyroidism due to acquired atrophy of thyroid  -     levothyroxine (SYNTHROID/LEVOTHROID) 25 MCG tablet; Take 1.5 tablets (37.5 mcg) by mouth daily  -     TSH      Advised supportive and symptomatic treatment.  Follow up with Provider - if condition persists or worsens.   work on lifestyle modification

## 2019-09-03 NOTE — LETTER
September 5, 2019      Cathy BRAVO Laverne  [address] [address]        Dear ,    We are writing to inform you of your test results.    Your thyroid function came back with in therapeutic range. Continue your current dose of levothyroxine.     Resulted Orders   TSH   Result Value Ref Range    TSH 2.32 0.40 - 4.00 mU/L       If you have any questions or concerns, please call the clinic at the number listed above.       Sincerely,        Vijay Hammonds PA-C/benjio

## 2019-09-09 ENCOUNTER — THERAPY VISIT (OUTPATIENT)
Dept: PHYSICAL THERAPY | Facility: CLINIC | Age: 24
End: 2019-09-09
Payer: COMMERCIAL

## 2019-09-09 DIAGNOSIS — G89.29 CHRONIC BILATERAL LOW BACK PAIN WITHOUT SCIATICA: ICD-10-CM

## 2019-09-09 DIAGNOSIS — M54.50 CHRONIC MIDLINE LOW BACK PAIN WITHOUT SCIATICA: ICD-10-CM

## 2019-09-09 DIAGNOSIS — M54.50 CHRONIC BILATERAL LOW BACK PAIN WITHOUT SCIATICA: ICD-10-CM

## 2019-09-09 DIAGNOSIS — G89.29 CHRONIC MIDLINE LOW BACK PAIN WITHOUT SCIATICA: ICD-10-CM

## 2019-09-09 PROCEDURE — 97110 THERAPEUTIC EXERCISES: CPT | Mod: GP | Performed by: PHYSICAL THERAPIST

## 2019-09-09 PROCEDURE — 97161 PT EVAL LOW COMPLEX 20 MIN: CPT | Mod: GP | Performed by: PHYSICAL THERAPIST

## 2019-09-09 NOTE — PROGRESS NOTES
[facility] Initial Evaluation  HPI  SUBJECTIVE  Cathy Galloway is a 24 year old female patient presenting to Physical Therapy with the following Primary Symptoms: Bilateral LBP with pain into superior posterior hip and buttock. She admits to having related symptoms spreading to the posterior thighs, but this is infrequent and transient for only a few minutes. These pains are described as intermittent. She denies having painful cough/sneeze, saddle anaesthesia, severe night pain, peripheral motor deficit, recent bowel/bladder change. Pain is rated 3/10 at rest, and 8/10 at worst. Patient describes pain as dull and aching. History of symptoms: These pains began suddenly March 2019 as the result of fall on ice. Aggravation of pain was noted 2 months later with another fall in the house. Previous treatment has included muscle relaxants. Patient notes that prior treatment has resulted in some improvement. Since onset, these pains are improving, but variable on a daily basis : Current Symptoms are worsened by: sleeping prone, lifting and twisting. Current Symptoms are relieved by lying supine, rest. AM pain is better than PM pain. Patient states that current complaints are not affecting sleep pattern. Recent surgical procedure: none. Recent imaging studies have not been performed. General health as reported by patient is: excellent. Pertinent medical history: depression, supravalvular aortic stenosis since birth, thyroid problems. She denies any significant current illness or recent hospital admissions. She denies any regonal implanted devices. Current occupational status:  at [employer]. Occupational duties include: lifting/carrying, pushing/pulling, prolonged standing, repetitive tasks. Previous functional status: fully functional prior to pain onset/injury. Patient reports she infrequently has to lifting and rotate with up to 40# loads.    PATIENT GOALS: To be able establish a  program to assist with long-term management to avoid future episodes of back pain.      OBJECTIVE    STATIC POSTURE: Unremarkable seated or standing posture.  -This patient was able to remain comfortably seated throughout exam.      MOTOR CONTROL:  -Pelvic tilting: Poor initial activation, mildly improved with manual cues from PT and self. Verbal cues also necessary to achieve contraction.  -TA activation: fair    ROM:  Flexion Floor with pain during eccentric phase   Extension 75% limited*   Sidebend left To proximal shin*   Sidebend right To proximal shin*   Rotation left 25% limited*   Rotation right 25% limited   *Denotes pain    Quadrant test: Posterior-lateral test positive for reproduction of pain on L side.    LE NEURO SCREEN    Myotomes:   Left Right   Hip flexion (L2) 4+/5 4+/5   Knee extension (L3) 5/5 5/5   Ankle dorsiflexion (L4) 5/5 5/5   Great toe extension (L5) 5/5 5/5   Ankle Plantarflexion (S1) NT NT         SEGMENTAL MOBILITY:  -PA spring test: Patient was Hypermobile through segments T12-L1 to L3-L4. Normal mobility of lower lumbar spine and lumbosacral junction.    PALPATION: Mild tenderness noted over upper and middle lumbar spinous processes.      HIP SCREEN:  -ROM: Hypermobile  -Other relevant findings: Mild provocation with end range passive hip flexion.      Lumbar Special Tests   Left Right   Irving test/Modified Irving test Neg Neg   Straight Leg Raise Neg Neg   Active Straight Leg Raise     Hamstring 90-90 -10 -10   Piriformis Test Neg Neg   Long Axis Distraction Pos for pain Pos for pain   FADIR Neg Neg   VALERIE Neg Neg   Posterior Capsule Compression     Slump Test Neg Neg   NT=Not Tested        REPEATED MOTIONS:  Motion Response (worse/no worse/better)   FIS    FIS x10    EIS    EIS x10    YISEL    YISEL x10    Prone lying No worse   Prone on elbows No worse   Prone press-up No worse   Prone press-up x10 No worse       Therapist Assessment: Cathy Galloway is a 24 year old female  patient presenting to Physical Therapy with low back pain. Patient demonstrates lumbar ROM deficits, hypermobility of segmental motion at site of pain, poor motor control of deep abdominals. Signs and symptoms are consistent with mild lumbar instability, potentially associated with a spondylolysis. Skilled PT services are necessary in order to reduce impairments and improve independent function.                    Objective:  System    Physical Exam    General     ROS    Assessment/Plan:    Patient is a 24 year old female with lumbar complaints.    Patient has the following significant findings with corresponding treatment plan.                Diagnosis 1:  LBP consistent with spondylolysis  Pain -  hot/cold therapy, manual therapy, self management, education and home program  Impaired muscle performance - neuro re-education and home program  Decreased function - therapeutic activities and home program  Instability -  Therapeutic Activity  Therapeutic Exercise  Neuromuscular Re-education  home program    Therapy Evaluation Codes:   Cumulative Therapy Evaluation is: Low complexity.    Previous and current functional limitations:  (See Goal Flow Sheet for this information)    Short term and Long term goals: (See Goal Flow Sheet for this information)     Communication ability:  Patient appears to be able to clearly communicate and understand verbal and written communication and follow directions correctly.  Treatment Explanation - The following has been discussed with the patient:   RX ordered/plan of care  Anticipated outcomes  Possible risks and side effects  This patient would benefit from PT intervention to resume normal activities.   Rehab potential is excellent.    Frequency:  1 X week, once daily  Duration:  for 6 weeks  Discharge Plan:  Achieve all LTG.  Independent in home treatment program.  Reach maximal therapeutic benefit.    Please refer to the daily flowsheet for treatment today, total treatment time and  time spent performing 1:1 timed codes.

## 2019-09-16 ENCOUNTER — THERAPY VISIT (OUTPATIENT)
Dept: PHYSICAL THERAPY | Facility: CLINIC | Age: 24
End: 2019-09-16
Payer: COMMERCIAL

## 2019-09-16 DIAGNOSIS — M54.50 CHRONIC BILATERAL LOW BACK PAIN WITHOUT SCIATICA: ICD-10-CM

## 2019-09-16 DIAGNOSIS — G89.29 CHRONIC BILATERAL LOW BACK PAIN WITHOUT SCIATICA: ICD-10-CM

## 2019-09-16 PROCEDURE — 97112 NEUROMUSCULAR REEDUCATION: CPT | Mod: GP | Performed by: PHYSICAL THERAPIST

## 2019-09-16 PROCEDURE — 97110 THERAPEUTIC EXERCISES: CPT | Mod: GP | Performed by: PHYSICAL THERAPIST

## 2019-09-23 ENCOUNTER — THERAPY VISIT (OUTPATIENT)
Dept: PHYSICAL THERAPY | Facility: CLINIC | Age: 24
End: 2019-09-23
Payer: COMMERCIAL

## 2019-09-23 DIAGNOSIS — M54.50 CHRONIC BILATERAL LOW BACK PAIN WITHOUT SCIATICA: ICD-10-CM

## 2019-09-23 DIAGNOSIS — G89.29 CHRONIC BILATERAL LOW BACK PAIN WITHOUT SCIATICA: ICD-10-CM

## 2019-09-23 PROCEDURE — 97110 THERAPEUTIC EXERCISES: CPT | Mod: GP | Performed by: PHYSICAL THERAPIST

## 2019-09-30 ENCOUNTER — THERAPY VISIT (OUTPATIENT)
Dept: PHYSICAL THERAPY | Facility: CLINIC | Age: 24
End: 2019-09-30
Payer: COMMERCIAL

## 2019-09-30 DIAGNOSIS — M54.50 CHRONIC BILATERAL LOW BACK PAIN WITHOUT SCIATICA: ICD-10-CM

## 2019-09-30 DIAGNOSIS — G89.29 CHRONIC BILATERAL LOW BACK PAIN WITHOUT SCIATICA: ICD-10-CM

## 2019-09-30 PROCEDURE — 97530 THERAPEUTIC ACTIVITIES: CPT | Mod: GP | Performed by: PHYSICAL THERAPIST

## 2019-09-30 PROCEDURE — 97110 THERAPEUTIC EXERCISES: CPT | Mod: GP | Performed by: PHYSICAL THERAPIST

## 2019-10-14 ENCOUNTER — THERAPY VISIT (OUTPATIENT)
Dept: PHYSICAL THERAPY | Facility: CLINIC | Age: 24
End: 2019-10-14
Payer: COMMERCIAL

## 2019-10-14 DIAGNOSIS — G89.29 CHRONIC BILATERAL LOW BACK PAIN WITHOUT SCIATICA: ICD-10-CM

## 2019-10-14 DIAGNOSIS — M54.50 CHRONIC BILATERAL LOW BACK PAIN WITHOUT SCIATICA: ICD-10-CM

## 2019-10-14 PROCEDURE — 97110 THERAPEUTIC EXERCISES: CPT | Mod: GP | Performed by: PHYSICAL THERAPIST

## 2019-10-14 PROCEDURE — 97530 THERAPEUTIC ACTIVITIES: CPT | Mod: GP | Performed by: PHYSICAL THERAPIST

## 2019-12-06 PROBLEM — G89.29 CHRONIC BILATERAL LOW BACK PAIN WITHOUT SCIATICA: Status: RESOLVED | Noted: 2019-09-09 | Resolved: 2019-12-06

## 2019-12-06 PROBLEM — M54.50 CHRONIC BILATERAL LOW BACK PAIN WITHOUT SCIATICA: Status: RESOLVED | Noted: 2019-09-09 | Resolved: 2019-12-06

## 2019-12-06 NOTE — PROGRESS NOTES
12/6/19:    Patient failed to follow-up and cancelled all scheduled follow up visits following her last appointment dated 10/14/19. Her current functional status is unknown at this time. Please see most recent note dated 10/14/19 for updated progress toward goals and most recent functional status. She is to be discharged from PT at this time.

## 2019-12-12 ENCOUNTER — TELEPHONE (OUTPATIENT)
Dept: SCHEDULING | Facility: CLINIC | Age: 24
End: 2019-12-12

## 2020-08-20 ENCOUNTER — OFFICE VISIT (OUTPATIENT)
Dept: CARDIOLOGY | Facility: CLINIC | Age: 25
End: 2020-08-20
Payer: COMMERCIAL

## 2020-08-20 ENCOUNTER — ANCILLARY PROCEDURE (OUTPATIENT)
Dept: CARDIOLOGY | Facility: CLINIC | Age: 25
End: 2020-08-20
Attending: PEDIATRICS
Payer: COMMERCIAL

## 2020-08-20 VITALS
HEART RATE: 89 BPM | WEIGHT: 257.28 LBS | DIASTOLIC BLOOD PRESSURE: 70 MMHG | BODY MASS INDEX: 42.87 KG/M2 | OXYGEN SATURATION: 98 % | SYSTOLIC BLOOD PRESSURE: 101 MMHG | HEIGHT: 65 IN | RESPIRATION RATE: 16 BRPM

## 2020-08-20 DIAGNOSIS — Q25.3 SUPRAVALVAR AORTIC STENOSIS: Primary | ICD-10-CM

## 2020-08-20 DIAGNOSIS — Q25.3 SUPRAVALVAR AORTIC STENOSIS: ICD-10-CM

## 2020-08-20 PROCEDURE — 93227 XTRNL ECG REC<48 HR R&I: CPT | Performed by: PEDIATRICS

## 2020-08-20 PROCEDURE — 93225 XTRNL ECG REC<48 HRS REC: CPT | Performed by: PEDIATRICS

## 2020-08-20 PROCEDURE — 99213 OFFICE O/P EST LOW 20 MIN: CPT | Mod: 25 | Performed by: PEDIATRICS

## 2020-08-20 PROCEDURE — 93303 ECHO TRANSTHORACIC: CPT | Performed by: PEDIATRICS

## 2020-08-20 PROCEDURE — 93320 DOPPLER ECHO COMPLETE: CPT | Performed by: PEDIATRICS

## 2020-08-20 PROCEDURE — 93325 DOPPLER ECHO COLOR FLOW MAPG: CPT | Performed by: PEDIATRICS

## 2020-08-20 ASSESSMENT — MIFFLIN-ST. JEOR: SCORE: 1919.13

## 2020-08-20 NOTE — NURSING NOTE
"Cathy Galloway's goals for this visit include: 2 year f/u supravalvar stenosis    She requests these members of her care team be copied on today's visit information: yes    PCP: Vijay Hammonds    Referring Provider:  Vijay Hammonds PA-C  [id number] [place] [address]    /70 (BP Location: Right arm, Patient Position: Sitting, Cuff Size: Adult Large)   Pulse 89   Resp 16   Ht 1.645 m (5' 4.76\")   Wt 116.7 kg (257 lb 4.4 oz)   SpO2 98%   BMI 43.13 kg/m          "

## 2020-08-20 NOTE — PROGRESS NOTES
"                                               PEDS Cardiac Consult Letter  Date: 2020      Vijay Hammonds PA-C  [id number] [place] [place] [address]      PATIENT: Cathy Galloway  :          1995   HEIDI:          2020    Dear Dr. Hammonds:    Cathy is 25 years old and was seen at the [facility] on 2020.   She has supravalvar aortic stenosis after surgical repair in .  Since that procedure she has remained asymptomatic.  Her brother has the same disease but has not required surgery.  Their mother had supravalvar aortic stenosis and  suddenly 2 years ago with no postmortem examination.  Cathy has not had any palpitations, chest pain or syncope.  She occasionally notices her heart beating more rapidly with anxiety.  She is on Lexapro and levothyroxine.  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    On physical examination her height was 1.645 m (5' 4.76\") and her weight was 116.7 kg (257 lb 4.4 oz).  Her heart rate was 89  and respirations 16 per minute.  The blood pressure in her right arm was 101/70.  She was acyanotic, warm and well perfused. She was alert cooperative and in no distress.  Her lungs were clear to auscultation without respiratory distress.  She had a regular rhythm with a grade 1/6 to 2/6 systolic ejection murmur at the mid left sternal border.  The second heart sound was physiologically split with a normal pulmonary component.   There was no organomegaly or abdominal tenderness.  Peripheral pulses were 2+ and equal in all extremities.  There was no clubbing or edema.    An echocardiogram performed today that I personally reviewed and explained to her and her brother showed a good postoperative result with a mean gradient of 5 mmHg, no aortic insufficiency or left ventricular hypertrophy.An electrocardiogram performed today that I personally reviewed and explained to her and her brother showed sinus rhythm with a corrected QT interval of " 445 ms and was probably normal with a slight rightward axis, unchanged from previous electrocardiograms.  Previously her QT interval had been borderline.    Cathy has a good result from surgical repair of supravalvar aortic stenosis.  I can see on her echocardiogram that there is some enlargement of the proximal right coronary artery.  Coronary artery enlargement is part of supravalvar aortic stenosis.  She does not need any restriction of her activities.  Her only risk factor for increased complications with COVID-19 is her weight.  I encouraged her to lose some weight before I see her back.  I arrange for her to get a 24-hour ECG monitor today.  I would like to see her in 1 year with an echocardiogram and CTA of her heart to look at her coronary arteries.  Encouraged her to take aspirin 81 mg daily.    Thank you very much for your confidence in allowing me to participate in Cathy's care.  If you have any questions or concerns, please don't hesitate to contact me.    Sincerely,      Esvin Merlos M.D.   Pediatric Cardiology   [facility]  Pediatric Specialty Clinic  [phone]    Note: Chart documentation done in part with Dragon Voice Recognition software. Although reviewed after completion, some word and grammatical errors may remain.

## 2020-08-20 NOTE — PATIENT INSTRUCTIONS
24-ZioPatch ordered per Dr. Merlos and applied in clinic.  ZioPatch instruction booklet and Button Press Log were reviewed with patient/family.  It was reinforced that patient should wait to shower until 24 hours after ZioPatch application and also avoid excessive sweating while ZioPatch is in place.  Patient/family provided ZioPatch kit, which they will mail back to [facility] once monitoring is complete.    Call [facility] #[phone] if:   - ZioPatch falls off  - Severe itching or irritation around ZioPatch  - ZioPatch is flashing orange (this does not mean there is a problems with your heart; it just means that the Patch is not well attached).       [facility]: #[phone]    Thank you for choosing [facility]. It was a pleasure to see you for your office visit today.     If you have any questions or scheduling needs during regular office hours, please call our [place] clinic: [phone]   If urgent concerns arise after hours, you can call [phone] and ask to speak to the pediatric specialist on call.   If you need to schedule Radiology tests, please call: [phone]  My Chart messages are for routine communication and questions and are usually answered within 48-72 hours. If you have an urgent concern or require sooner response, please call us.  Outside lab and imaging results should be faxed to [phone].  If you go to a lab outside of [facility] we will not automatically get those results. You will need to ask to have them faxed.       If you had any blood work, imaging or other tests completed today:  Normal test results will be mailed to your home address in a letter.  Abnormal results will be communicated to you via phone call/letter.  Please allow up to 1-2 weeks for processing and interpretation of most lab work.

## 2020-08-20 NOTE — LETTER
"  2020      RE: Cathy Galloway  [address]  [place] [address]                                                      PEDS Cardiac Consult Letter  Date: 2020      Vijay Hammonds PA-C  [address] [place] [address]      PATIENT: Cathy Galloway  :          1995   HEIDI:          2020    Dear Dr. Hammonds:    Cathy is 25 years old and was seen at the [facility] on 2020.   She has supravalvar aortic stenosis after surgical repair in .  Since that procedure she has remained asymptomatic.  Her brother has the same disease but has not required surgery.  Their mother had supravalvar aortic stenosis and  suddenly 2 years ago with no postmortem examination.  Cathy has not had any palpitations, chest pain or syncope.  She occasionally notices her heart beating more rapidly with anxiety.  She is on Lexapro and levothyroxine.  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    On physical examination her height was 1.645 m (5' 4.76\") and her weight was 116.7 kg (257 lb 4.4 oz).  Her heart rate was 89  and respirations 16 per minute.  The blood pressure in her right arm was 101/70.  She was acyanotic, warm and well perfused. She was alert cooperative and in no distress.  Her lungs were clear to auscultation without respiratory distress.  She had a regular rhythm with a grade 1/6 to 2/6 systolic ejection murmur at the mid left sternal border.  The second heart sound was physiologically split with a normal pulmonary component.   There was no organomegaly or abdominal tenderness.  Peripheral pulses were 2+ and equal in all extremities.  There was no clubbing or edema.    An echocardiogram performed today that I personally reviewed and explained to her and her brother showed a good postoperative result with a mean gradient of 5 mmHg, no aortic insufficiency or left ventricular hypertrophy.An electrocardiogram performed today that I personally " reviewed and explained to her and her brother showed sinus rhythm with a corrected QT interval of 445 ms and was probably normal with a slight rightward axis, unchanged from previous electrocardiograms.  Previously her QT interval had been borderline.    Cathy has a good result from surgical repair of supravalvar aortic stenosis.  I can see on her echocardiogram that there is some enlargement of the proximal right coronary artery.  Coronary artery enlargement is part of supravalvar aortic stenosis.  She does not need any restriction of her activities.  Her only risk factor for increased complications with COVID-19 is her weight.  I encouraged her to lose some weight before I see her back.  I arrange for her to get a 24-hour ECG monitor today.  I would like to see her in 1 year with an echocardiogram and CTA of her heart to look at her coronary arteries.  Encouraged her to take aspirin 81 mg daily.    Thank you very much for your confidence in allowing me to participate in Cathy's care.  If you have any questions or concerns, please don't hesitate to contact me.    Sincerely,      Esvin Merlos M.D.   Pediatric Cardiology   [facility]  Pediatric Specialty Clinic  [phone]    Note: Chart documentation done in part with Dragon Voice Recognition software. Although reviewed after completion, some word and grammatical errors may remain.     Esvin Merlos MD

## 2020-09-14 ENCOUNTER — TELEPHONE (OUTPATIENT)
Dept: CARDIOLOGY | Facility: CLINIC | Age: 25
End: 2020-09-14

## 2020-09-14 NOTE — TELEPHONE ENCOUNTER
LVM on dad's cell (?) - Phone numbers in demographics are not working and/or w/different names. Results of Cathy's zio report are available. Waiting for call back to verify phone numbers, address, etc. Prior to mailing results.    Per Dr. Merlos:  Normal. Palpitations may represent occasional PACs/PVCs, but these are normal. Keep appointment for 1 year when we will get CTA at [facility].    Appointment for [facility] has not been made. Assist Cathy in scheduling this appointment. Waiting for call back. Routed to JUMANA Hansen to help monitor.

## 2020-09-15 NOTE — TELEPHONE ENCOUNTER
Spoke with patient's regarding results and recommendations per Dr. Merlos (see message below). Patient's verbalized understanding. This RN will send the [facility] team a message regarding need to schedule a CTA and follow-up visit in one year. No further questions or concerns.  Jade Hicks RN

## 2020-10-19 ENCOUNTER — OFFICE VISIT (OUTPATIENT)
Dept: FAMILY MEDICINE | Facility: CLINIC | Age: 25
End: 2020-10-19
Payer: COMMERCIAL

## 2020-10-19 VITALS
WEIGHT: 259 LBS | DIASTOLIC BLOOD PRESSURE: 69 MMHG | HEART RATE: 104 BPM | OXYGEN SATURATION: 96 % | BODY MASS INDEX: 43.41 KG/M2 | RESPIRATION RATE: 20 BRPM | TEMPERATURE: 99 F | SYSTOLIC BLOOD PRESSURE: 95 MMHG

## 2020-10-19 DIAGNOSIS — F41.1 GAD (GENERALIZED ANXIETY DISORDER): Primary | ICD-10-CM

## 2020-10-19 DIAGNOSIS — E03.4 HYPOTHYROIDISM DUE TO ACQUIRED ATROPHY OF THYROID: ICD-10-CM

## 2020-10-19 PROCEDURE — 99214 OFFICE O/P EST MOD 30 MIN: CPT | Performed by: PHYSICIAN ASSISTANT

## 2020-10-19 PROCEDURE — 36415 COLL VENOUS BLD VENIPUNCTURE: CPT | Performed by: PHYSICIAN ASSISTANT

## 2020-10-19 PROCEDURE — 84443 ASSAY THYROID STIM HORMONE: CPT | Performed by: PHYSICIAN ASSISTANT

## 2020-10-19 RX ORDER — VENLAFAXINE HYDROCHLORIDE 37.5 MG/1
CAPSULE, EXTENDED RELEASE ORAL
Qty: 113 CAPSULE | Refills: 0 | Status: SHIPPED | OUTPATIENT
Start: 2020-10-19 | End: 2021-01-11

## 2020-10-19 RX ORDER — LEVOTHYROXINE SODIUM 25 UG/1
37.5 TABLET ORAL DAILY
Qty: 135 TABLET | Refills: 3 | Status: SHIPPED | OUTPATIENT
Start: 2020-10-19 | End: 2022-01-19

## 2020-10-19 ASSESSMENT — ANXIETY QUESTIONNAIRES
GAD7 TOTAL SCORE: 18
5. BEING SO RESTLESS THAT IT IS HARD TO SIT STILL: MORE THAN HALF THE DAYS
2. NOT BEING ABLE TO STOP OR CONTROL WORRYING: NEARLY EVERY DAY
7. FEELING AFRAID AS IF SOMETHING AWFUL MIGHT HAPPEN: MORE THAN HALF THE DAYS
1. FEELING NERVOUS, ANXIOUS, OR ON EDGE: NEARLY EVERY DAY
3. WORRYING TOO MUCH ABOUT DIFFERENT THINGS: NEARLY EVERY DAY
IF YOU CHECKED OFF ANY PROBLEMS ON THIS QUESTIONNAIRE, HOW DIFFICULT HAVE THESE PROBLEMS MADE IT FOR YOU TO DO YOUR WORK, TAKE CARE OF THINGS AT HOME, OR GET ALONG WITH OTHER PEOPLE: VERY DIFFICULT
6. BECOMING EASILY ANNOYED OR IRRITABLE: NEARLY EVERY DAY

## 2020-10-19 ASSESSMENT — PATIENT HEALTH QUESTIONNAIRE - PHQ9
SUM OF ALL RESPONSES TO PHQ QUESTIONS 1-9: 21
5. POOR APPETITE OR OVEREATING: MORE THAN HALF THE DAYS

## 2020-10-19 NOTE — LETTER
[facility] [place]  [address]  [place] [address] [phone]          October 20, 2020      Cathy Galloway  [address] [address]        Dear MsAnabelle,    We are writing to inform you of your test results.    Your test results fall within the expected range(s) or remain unchanged from previous results.  Please continue with current treatment plan.    Resulted Orders   TSH with free T4 reflex   Result Value Ref Range    TSH 3.85 0.40 - 4.00 mU/L       If you have any questions or concerns, please call the clinic at the number listed above.       Sincerely,        FARA Hilton/benjio

## 2020-10-19 NOTE — PROGRESS NOTES
Subjective     Cathy Galloway is a 25 year old female who presents to clinic today for the following health issues:    HPI   Anxiety Follow-Up    How are you doing with your anxiety since your last visit? Worsened over the last 6 months. Ran out of Lexapro 1-2 weeks ago.    Are you having other symptoms that might be associated with anxiety? No    Have you had a significant life event? No     Are you feeling depressed? Yes:  sometimes    Do you have any concerns with your use of alcohol or other drugs? No    Social History     Tobacco Use     Smoking status: Never Smoker     Smokeless tobacco: Never Used     Tobacco comment: Lives in smoke free household   Substance Use Topics     Alcohol use: No     Drug use: No     THANG-7 SCORE 10/12/2017 7/17/2018 10/19/2020   Total Score 3 4 18     PHQ 10/12/2017 7/17/2018 10/19/2020   PHQ-9 Total Score 5 5 21   Q9: Thoughts of better off dead/self-harm past 2 weeks Not at all Not at all Several days     Last PHQ-9 10/19/2020   1.  Little interest or pleasure in doing things 3   2.  Feeling down, depressed, or hopeless 2   3.  Trouble falling or staying asleep, or sleeping too much 3   4.  Feeling tired or having little energy 3   5.  Poor appetite or overeating 3   6.  Feeling bad about yourself 3   7.  Trouble concentrating 2   8.  Moving slowly or restless 1   Q9: Thoughts of better off dead/self-harm past 2 weeks 1   PHQ-9 Total Score 21   Difficulty at work, home, or with people Very difficult     THANG-7  10/19/2020   1. Feeling nervous, anxious, or on edge 3   2. Not being able to stop or control worrying 3   3. Worrying too much about different things 3   4. Trouble relaxing 2   5. Being so restless that it is hard to sit still 2   6. Becoming easily annoyed or irritable 3   7. Feeling afraid, as if something awful might happen 2   THANG-7 Total Score 18   If you checked any problems, how difficult have they made it for you to do your work, take care of things at home,  or get along with other people? Very difficult       Hypothyroidism Follow-up      Since last visit, patient describes the following symptoms: tremors, anxiety, depression, fatigue and hair loss      How many servings of fruits and vegetables do you eat daily?  2-3    On average, how many sweetened beverages do you drink each day (Examples: soda, juice, sweet tea, etc.  Do NOT count diet or artificially sweetened beverages)?   1    How many days per week do you exercise enough to make your heart beat faster? 4    How many minutes a day do you exercise enough to make your heart beat faster? 30 - 60    How many days per week do you miss taking your medication? 0.     Review of Systems   Constitutional, HEENT, cardiovascular, pulmonary, gi and gu systems are negative, except as otherwise noted.      Objective    BP 95/69   Pulse 104   Temp 99  F (37.2  C) (Tympanic)   Resp 20   Wt 117.5 kg (259 lb)   SpO2 96%   BMI 43.41 kg/m    Body mass index is 43.41 kg/m .  Physical Exam  Vitals signs and nursing note reviewed.   Constitutional:       General: She is not in acute distress.     Appearance: Normal appearance. She is not diaphoretic.   HENT:      Head: Normocephalic and atraumatic.      Nose: Nose normal.   Eyes:      Conjunctiva/sclera: Conjunctivae normal.   Pulmonary:      Effort: Pulmonary effort is normal. No respiratory distress.   Skin:     General: Skin is warm and dry.      Coloration: Skin is not jaundiced or pale.   Neurological:      General: No focal deficit present.      Mental Status: She is alert. Mental status is at baseline.   Psychiatric:         Mood and Affect: Mood normal.         Behavior: Behavior normal.      Comments: Endorses passive suicidal ideation without plan or intent. Denies HI, SIB or hallucinations.          Results for orders placed or performed in visit on 10/19/20   TSH with free T4 reflex     Status: None   Result Value Ref Range    TSH 3.85 0.40 - 4.00 mU/L            Assessment & Plan   Problem List Items Addressed This Visit        Endocrine    Hypothyroidism due to acquired atrophy of thyroid    Relevant Medications    levothyroxine (SYNTHROID/LEVOTHROID) 25 MCG tablet    Other Relevant Orders    TSH with free T4 reflex (Completed)       Behavioral    THANG (generalized anxiety disorder) - Primary    Relevant Medications    venlafaxine (EFFEXOR-XR) 37.5 MG 24 hr capsule         Cathy Galloway is a 25 year old female who presents today for evaluation of anxiety and hypothyroidism.    Patient evaluated. Patient with history of anxiety, which is likely the cause of the patient's current symptoms.  Social stressors may be contributing as well.  No signs or symptoms to suggest metabolic abnormality or other organic disorder as the cause of the patient's symptoms.  Lab workup significant for normal TSH. Synthroid refilled.  Given the patient's current history, I do not feel that they are an immediate danger to themselves or others.  I do not feel that they require emergency psychiatric admission and evaluation.  I feel that they are appropriate for outpatient follow up, and the patient agrees. Will start her on Effexor. Patient contracts for safety verbally and will go to the emergency department if they develop any worsening symptoms or any other symptoms concerning to them.  They will follow up with primary care physician in one month to reassess. Declines therapy referral.    Complete history and physical exam as above. AF with normal VS.    DDx and Dx discussed with and explained to the pt to their satisfaction.  All questions were answered at this time. Pt expressed understanding of and agreement with this dx, tx, and plan. No further workup warranted and standard medication warnings given. I have given the patient a list of pertinent indications for re-evaluation. Will go to the Emergency Department if symptoms worsen or new concerning symptoms arise. Patient left in no  apparent distress.     Follow Up      Follow Up Actions Taken  Crisis resource information provided in the After Visit Summary  Patient declined referral.    Discussed the following ways the patient can remain in a safe environment:  be around others         See Patient Instructions  Return in about 1 month (around 11/19/2020) for a recheck of your symptoms.    FARA Hilton  [facility]

## 2020-10-19 NOTE — PATIENT INSTRUCTIONS
Jeanine Morgan,    Thank you for allowing [facility] to manage your care.    I ordered some blood work, please go to the laboratory to get your laboratory studies.    Follow up with us in one month.    I sent your prescriptions to your pharmacy.    Please allow 1-2 business days for our office to contact you in regards to your laboratory/radiological studies.  If not done so, I encourage you to login into [employer] ([email or web address] to review your results as well.     If you have any questions or concerns, please feel free to call us at [phone]    Sincerely,    Maycol Calvin PA-C    Did you know?  You can schedule an e-Visit for certain simple non-emergent issue for your convenience.  To learn more about or start an eVisit, simply login to [employer], click  Visits  on top banner, click  Start a Virtual Visit  drop down, and click  Symptom-Specific E-Visit     Patient Education     Treating Anxiety Disorders with Medicine  An anxiety disorder can make you feel nervous or apprehensive, even without a clear reason. In people age 65 and older, generalized anxiety disorder is one of the most commonly diagnosed anxiety disorders. Many times it occurs with depression. Certain anxiety disorders can cause intense feelings of fear or panic. You may even have physical symptoms such as a racing heartbeat, sweating, or dizziness. If you have these feelings, you don t have to suffer anymore. Treatment to help you overcome your fears will likely include therapy (also called counseling). Medicine may also be prescribed to help control your symptoms.    Medicines  Certain medicines may be prescribed to help control your symptoms. So you may feel less anxious. You may also feel able to move forward with therapy. At first, medicines and dosages may need to be adjusted to find what works best for you. Try to be patient. Tell your healthcare provider how a medicine makes you feel. This way, you can  work together to find the treatment that s best for you. Keep in mind that medicines can have side effects. Talk with your provider about any side effects that are bothering you. Changing the dose or type of medicine may help. Don t stop taking medicine on your own. That can cause symptoms to come back or cause dangerous withdrawal symptoms.    Anti-anxiety medicine. This medicine eases symptoms and helps you relax. Your healthcare provider will explain when and how to use it. It may be prescribed for use before situations that make you anxious. You may also be told to take medicine on a regular schedule. Anti-anxiety medicine may make you feel a little sleepy or  out of it.  Don t drive a car or operate machinery while on this medicine, until you know how it affects you.  Never use alcohol or other drugs with anti-anxiety medicines. This could result in loss of muscular control, sedation, coma, or death. Also, use only the amount of medicine prescribed for you. If you think you may have taken too much, get emergency care right away. Never share your medications with others. Store these medications in a safe place that can't be accessed by children or visitors.  Keep taking medicines as prescribed  Never change your dosage, share or use another person's medicine, or stop taking your medicines without talking to your healthcare provider first. Keep the following in mind:    Some medicines must be taken on a schedule. Make this part of your daily routine. For instance, always take your pill before brushing your teeth. A pillbox can help you remember if you ve taken your medicine each day.    Medicines are often taken for 6 to 12 months. Your healthcare provider will then evaluate whether you need to stay on them. Many people who have also had therapy may no longer need medicine to manage anxiety.    You may need to stop taking medicine slowly to give your body time to adjust. When it s time to stop, your healthcare  provider will tell you more. Remember: Never stop taking your medicine without talking to your provider first.    If symptoms return, you may need to start taking medicines again. This isn t your fault. It s just the nature of your anxiety disorder.    Side effects. Medicines may cause side effects. Ask your healthcare provider or pharmacist what you can expect. They may have ideas for avoiding some side effects.    Sexual problems. Some antidepressants can affect your desire for sex or your ability to have an orgasm. A change in dosage or medicine often solves the problem. If you have a sexual side effect that concerns you, tell your healthcare provider.    Addiction. If you ve never had a problem with drugs or alcohol, you may not have a problem with medicines used to treat anxiety disorders. But always discuss the medicines with your healthcare provider before taking them. If you have a history of addiction, you may not be able to use certain medicines used to treat anxiety disorders.    Medicine interactions. Always check with your pharmacist before using any over-the-counter medicines (OTCs), including herbal supplements. Some OTCs may interact with your anti-anxiety medications and increase or decrease their effectiveness.    Date Last Reviewed: 5/1/2017 2000-2019 [employer]. [address] [place] [address]. All rights reserved. This information is not intended as a substitute for professional medical care. Always follow your healthcare professional's instructions.           Patient Education     Depression: Tips to Help Yourself    As your healthcare providers help treat your depression, you can also help yourself. Keep in mind that your illness affects you emotionally, physically, mentally, and socially. So full recovery will take time. Take care of your body and your soul, and be patient with yourself as you get better.  Self-care    Educate yourself. Read about treatment and  medicine options. If you have the energy, attend local conferences or support groups. Keep a list of useful websites and helpful books and use them as needed. This illness is not your fault. Don t blame yourself for your depression.    Manage early symptoms. If you notice symptoms returning, experience triggers, or identify other factors that may lead to a depressive episode, get help as soon as possible. Ask trusted friends and family to monitor your behavior and let you know if they see anything of concern.    Work with your provider. Find a provider you can trust. Communicate honestly with that person and share information on your treatment for depression and your reaction to medicines.    Be prepared for a crisis. Know what to do if you experience a crisis. Keep the phone number of a crisis hotline and know the location of your community's urgent care centers and the closest emergency department.    Hold off on big decisions. Depression can cloud your judgment. So wait until you feel better before making major life decisions, such as changing jobs, moving, or getting  or .    Be patient. Recovering from depression is a process. Don t be discouraged if it takes some time to feel better.    Keep it simple. Depression saps your energy and concentration. So you won t be able to do all the things you used to do. Set small goals and do what you can.    Be with others. Don t isolate yourself--you ll only feel worse. Try to be with other people. And take part in fun activities when you can. Go to a movie, ballgame, [employer] service, or social event. Talk openly with people you can trust. And accept help when it s offered.  Take care of your body  People with depression often lose the desire to take care of themselves. That only makes their problems worse. During treatment and afterward, make a point to:    Exercise. It s a great way to take care of your body. And studies have shown that exercise helps  fight depression.    Avoid drugs and alcohol. These may ease the pain in the short term. But they ll only make your problems worse in the long run.    Get relief from stress. Ask your healthcare provider for relaxation exercises and techniques to help relieve stress.    Eat right. A balanced and healthy diet helps keep your body healthy.  Date Last Reviewed: 1/1/2017 2000-2019 [employer]. [address] [place] [address]. All rights reserved. This information is not intended as a substitute for professional medical care. Always follow your healthcare professional's instructions.

## 2020-10-20 LAB — TSH SERPL DL<=0.005 MIU/L-ACNC: 3.85 MU/L (ref 0.4–4)

## 2020-10-20 ASSESSMENT — ANXIETY QUESTIONNAIRES: GAD7 TOTAL SCORE: 18

## 2021-01-11 ENCOUNTER — OFFICE VISIT (OUTPATIENT)
Dept: FAMILY MEDICINE | Facility: CLINIC | Age: 26
End: 2021-01-11
Payer: COMMERCIAL

## 2021-01-11 VITALS
HEART RATE: 80 BPM | OXYGEN SATURATION: 97 % | WEIGHT: 259.2 LBS | SYSTOLIC BLOOD PRESSURE: 96 MMHG | BODY MASS INDEX: 43.45 KG/M2 | DIASTOLIC BLOOD PRESSURE: 63 MMHG | RESPIRATION RATE: 16 BRPM | TEMPERATURE: 98.9 F

## 2021-01-11 DIAGNOSIS — F33.1 MODERATE EPISODE OF RECURRENT MAJOR DEPRESSIVE DISORDER (H): ICD-10-CM

## 2021-01-11 DIAGNOSIS — F41.1 GAD (GENERALIZED ANXIETY DISORDER): Primary | ICD-10-CM

## 2021-01-11 DIAGNOSIS — Z12.4 CERVICAL CANCER SCREENING: ICD-10-CM

## 2021-01-11 PROCEDURE — 99214 OFFICE O/P EST MOD 30 MIN: CPT | Mod: 25 | Performed by: PHYSICIAN ASSISTANT

## 2021-01-11 PROCEDURE — 96127 BRIEF EMOTIONAL/BEHAV ASSMT: CPT | Performed by: PHYSICIAN ASSISTANT

## 2021-01-11 PROCEDURE — 90471 IMMUNIZATION ADMIN: CPT | Performed by: PHYSICIAN ASSISTANT

## 2021-01-11 PROCEDURE — 90715 TDAP VACCINE 7 YRS/> IM: CPT | Performed by: PHYSICIAN ASSISTANT

## 2021-01-11 RX ORDER — ASPIRIN 81 MG/1
81 TABLET ORAL DAILY
COMMUNITY

## 2021-01-11 RX ORDER — FLUOXETINE 10 MG/1
CAPSULE ORAL
Qty: 70 CAPSULE | Refills: 0 | Status: SHIPPED | OUTPATIENT
Start: 2021-01-11 | End: 2021-02-17

## 2021-01-11 ASSESSMENT — PATIENT HEALTH QUESTIONNAIRE - PHQ9
5. POOR APPETITE OR OVEREATING: SEVERAL DAYS
SUM OF ALL RESPONSES TO PHQ QUESTIONS 1-9: 13

## 2021-01-11 ASSESSMENT — ANXIETY QUESTIONNAIRES
2. NOT BEING ABLE TO STOP OR CONTROL WORRYING: SEVERAL DAYS
7. FEELING AFRAID AS IF SOMETHING AWFUL MIGHT HAPPEN: SEVERAL DAYS
IF YOU CHECKED OFF ANY PROBLEMS ON THIS QUESTIONNAIRE, HOW DIFFICULT HAVE THESE PROBLEMS MADE IT FOR YOU TO DO YOUR WORK, TAKE CARE OF THINGS AT HOME, OR GET ALONG WITH OTHER PEOPLE: SOMEWHAT DIFFICULT
6. BECOMING EASILY ANNOYED OR IRRITABLE: NEARLY EVERY DAY
3. WORRYING TOO MUCH ABOUT DIFFERENT THINGS: SEVERAL DAYS
1. FEELING NERVOUS, ANXIOUS, OR ON EDGE: SEVERAL DAYS
5. BEING SO RESTLESS THAT IT IS HARD TO SIT STILL: NOT AT ALL
GAD7 TOTAL SCORE: 8

## 2021-01-11 NOTE — PROGRESS NOTES
Pamella Morgan is a 25 year old who presents to clinic today for the following health issues     HPI       Anxiety Follow-Up    How are you doing with your anxiety since your last visit? No change    Are you having other symptoms that might be associated with anxiety? Yes:  sad while on effexor    Have you had a significant life event? No     Are you feeling depressed? Yes:  .    Do you have any concerns with your use of alcohol or other drugs? No  lexapro wasn't helping much anymore.  Felt more depressed on depression.  Anxiety over all is less. Sleeping fine.  Noting more depression . Noting some increased anhedonia.   History of doing some counseling.   Didn;t seem all that helpful.   Social History     Tobacco Use     Smoking status: Never Smoker     Smokeless tobacco: Never Used     Tobacco comment: Lives in smoke free household   Substance Use Topics     Alcohol use: Yes     Comment: rare     Drug use: No     THANG-7 SCORE 7/17/2018 10/19/2020 1/11/2021   Total Score 4 18 8     PHQ 7/17/2018 10/19/2020 1/11/2021   PHQ-9 Total Score 5 21 13   Q9: Thoughts of better off dead/self-harm past 2 weeks Not at all Several days More than half the days     Last PHQ-9 1/11/2021   1.  Little interest or pleasure in doing things 2   2.  Feeling down, depressed, or hopeless 2   3.  Trouble falling or staying asleep, or sleeping too much 1   4.  Feeling tired or having little energy 1   5.  Poor appetite or overeating 2   6.  Feeling bad about yourself 2   7.  Trouble concentrating 1   8.  Moving slowly or restless 0   Q9: Thoughts of better off dead/self-harm past 2 weeks 2   PHQ-9 Total Score 13   Difficulty at work, home, or with people Very difficult     THANG-7  1/11/2021   1. Feeling nervous, anxious, or on edge 1   2. Not being able to stop or control worrying 1   3. Worrying too much about different things 1   4. Trouble relaxing 1   5. Being so restless that it is hard to sit still 0   6. Becoming easily  annoyed or irritable 3   7. Feeling afraid, as if something awful might happen 1   THANG-7 Total Score 8   If you checked any problems, how difficult have they made it for you to do your work, take care of things at home, or get along with other people? Somewhat difficult         How many servings of fruits and vegetables do you eat daily?  2-3    On average, how many sweetened beverages do you drink each day (Examples: soda, juice, sweet tea, etc.  Do NOT count diet or artificially sweetened beverages)?   3    How many days per week do you exercise enough to make your heart beat faster? 7    How many minutes a day do you exercise enough to make your heart beat faster? 60 or more  How many days per week do you miss taking your medication? Ran out of effexor, didn't like how it made her feel    What makes it hard for you to take your medications?  NA        Review of Systems   Constitutional, HEENT, cardiovascular, pulmonary, GI, , musculoskeletal, neuro, skin, endocrine and psych systems are negative, except as otherwise noted.      Objective    BP 96/63   Pulse 80   Temp 98.9  F (37.2  C) (Tympanic)   Resp 16   Wt 117.6 kg (259 lb 3.2 oz)   SpO2 97%   Breastfeeding No   BMI 43.45 kg/m    Body mass index is 43.45 kg/m .  Physical Exam   Eye exam - right eye normal lid, conjunctiva, cornea, pupil and fundus, left eye normal lid, conjunctiva, cornea, pupil and fundus.  Thyroid not palpable, not enlarged, no nodules detected.  CHEST:chest clear to IPPA, no tachypnea, retractions or cyanosis and S1, S2 normal, no murmur, no gallop, rate regular.        Cathy was seen today for anxiety.    Diagnoses and all orders for this visit:    THANG (generalized anxiety disorder)  -     FLUoxetine (PROZAC) 10 MG capsule; Take 1 cap daily for 1 week, then increase to 2 caps daily thereafter    Moderate episode of recurrent major depressive disorder (H)  -     FLUoxetine (PROZAC) 10 MG capsule; Take 1 cap daily for 1 week,  then increase to 2 caps daily thereafter    Cervical cancer screening  -     OB/GYN REFERRAL    Other orders  -     TDAP VACCINE (Adacel, Boostrix)  [[id number]]      work on lifestyle modification  Recheck in 1 mos

## 2021-01-12 ASSESSMENT — ANXIETY QUESTIONNAIRES: GAD7 TOTAL SCORE: 8

## 2021-02-17 ENCOUNTER — OFFICE VISIT (OUTPATIENT)
Dept: FAMILY MEDICINE | Facility: CLINIC | Age: 26
End: 2021-02-17

## 2021-02-17 VITALS
WEIGHT: 254.2 LBS | HEART RATE: 83 BPM | RESPIRATION RATE: 20 BRPM | OXYGEN SATURATION: 97 % | DIASTOLIC BLOOD PRESSURE: 62 MMHG | SYSTOLIC BLOOD PRESSURE: 110 MMHG | TEMPERATURE: 99 F | BODY MASS INDEX: 42.61 KG/M2

## 2021-02-17 DIAGNOSIS — F33.1 MODERATE EPISODE OF RECURRENT MAJOR DEPRESSIVE DISORDER (H): ICD-10-CM

## 2021-02-17 DIAGNOSIS — F41.1 GAD (GENERALIZED ANXIETY DISORDER): ICD-10-CM

## 2021-02-17 PROCEDURE — 99213 OFFICE O/P EST LOW 20 MIN: CPT | Performed by: PHYSICIAN ASSISTANT

## 2021-02-17 NOTE — PROGRESS NOTES
Pamella Morgan is a 25 year old who presents for the following health issues     HPI       Depression and Anxiety Follow-Up    How are you doing with your depression since your last visit? Improved     How are you doing with your anxiety since your last visit?  Improved     Are you having other symptoms that might be associated with depression or anxiety? No    Have you had a significant life event? No     Do you have any concerns with your use of alcohol or other drugs? No  Doing well . No depression. No anhedonia. No side effects to fluoxetine. No insomnia.   Social History     Tobacco Use     Smoking status: Never Smoker     Smokeless tobacco: Never Used     Tobacco comment: Lives in smoke free household   Substance Use Topics     Alcohol use: Yes     Comment: rare     Drug use: No     PHQ 7/17/2018 10/19/2020 1/11/2021   PHQ-9 Total Score 5 21 13   Q9: Thoughts of better off dead/self-harm past 2 weeks Not at all Several days More than half the days     THANG-7 SCORE 7/17/2018 10/19/2020 1/11/2021   Total Score 4 18 8     Last PHQ-9 1/11/2021   1.  Little interest or pleasure in doing things 2   2.  Feeling down, depressed, or hopeless 2   3.  Trouble falling or staying asleep, or sleeping too much 1   4.  Feeling tired or having little energy 1   5.  Poor appetite or overeating 2   6.  Feeling bad about yourself 2   7.  Trouble concentrating 1   8.  Moving slowly or restless 0   Q9: Thoughts of better off dead/self-harm past 2 weeks 2   PHQ-9 Total Score 13   Difficulty at work, home, or with people Very difficult     THANG-7  1/11/2021   1. Feeling nervous, anxious, or on edge 1   2. Not being able to stop or control worrying 1   3. Worrying too much about different things 1   4. Trouble relaxing 1   5. Being so restless that it is hard to sit still 0   6. Becoming easily annoyed or irritable 3   7. Feeling afraid, as if something awful might happen 1   THANG-7 Total Score 8   If you checked any  problems, how difficult have they made it for you to do your work, take care of things at home, or get along with other people? Somewhat difficult       Suicide Assessment Five-step Evaluation and Treatment (SAFE-T)      How many servings of fruits and vegetables do you eat daily?  2-3    On average, how many sweetened beverages do you drink each day (Examples: soda, juice, sweet tea, etc.  Do NOT count diet or artificially sweetened beverages)?   1    How many days per week do you exercise enough to make your heart beat faster? 5    How many minutes a day do you exercise enough to make your heart beat faster? 30 - 60    How many days per week do you miss taking your medication? 0        Review of Systems   Constitutional, HEENT, cardiovascular, pulmonary, GI, , musculoskeletal, neuro, skin, endocrine and psych systems are negative, except as otherwise noted.      Objective    /62   Pulse 83   Temp 99  F (37.2  C) (Tympanic)   Resp 20   Wt 115.3 kg (254 lb 3.2 oz)   SpO2 97%   Breastfeeding No   BMI 42.61 kg/m    Body mass index is 42.61 kg/m .  Physical Exam   Eye exam - right eye normal lid, conjunctiva, cornea, pupil and fundus, left eye normal lid, conjunctiva, cornea, pupil and fundus.  Thyroid not palpable, not enlarged, no nodules detected.  CHEST:chest clear to IPPA, no tachypnea, retractions or cyanosis and S1, S2 normal, no murmur, no gallop, rate regular.        Cathy was seen today for anxiety.    Diagnoses and all orders for this visit:    THANG (generalized anxiety disorder)  -     FLUoxetine (PROZAC) 20 MG capsule; Take 1 capsule (20 mg) by mouth daily Take 1 cap daily for 1 week, then increase to 2 caps daily thereafter    Moderate episode of recurrent major depressive disorder (H)  -     FLUoxetine (PROZAC) 20 MG capsule; Take 1 capsule (20 mg) by mouth daily Take 1 cap daily for 1 week, then increase to 2 caps daily thereafter      work on lifestyle modification  Recheck in 6 mos

## 2021-04-03 ENCOUNTER — HEALTH MAINTENANCE LETTER (OUTPATIENT)
Age: 26
End: 2021-04-03

## 2021-08-10 ASSESSMENT — PATIENT HEALTH QUESTIONNAIRE - PHQ9
SUM OF ALL RESPONSES TO PHQ QUESTIONS 1-9: 7
SUM OF ALL RESPONSES TO PHQ QUESTIONS 1-9: 7
10. IF YOU CHECKED OFF ANY PROBLEMS, HOW DIFFICULT HAVE THESE PROBLEMS MADE IT FOR YOU TO DO YOUR WORK, TAKE CARE OF THINGS AT HOME, OR GET ALONG WITH OTHER PEOPLE: SOMEWHAT DIFFICULT

## 2021-08-10 ASSESSMENT — ANXIETY QUESTIONNAIRES
7. FEELING AFRAID AS IF SOMETHING AWFUL MIGHT HAPPEN: MORE THAN HALF THE DAYS
GAD7 TOTAL SCORE: 8
5. BEING SO RESTLESS THAT IT IS HARD TO SIT STILL: NOT AT ALL
2. NOT BEING ABLE TO STOP OR CONTROL WORRYING: SEVERAL DAYS
GAD7 TOTAL SCORE: 8
3. WORRYING TOO MUCH ABOUT DIFFERENT THINGS: SEVERAL DAYS
6. BECOMING EASILY ANNOYED OR IRRITABLE: MORE THAN HALF THE DAYS
4. TROUBLE RELAXING: SEVERAL DAYS
GAD7 TOTAL SCORE: 8
7. FEELING AFRAID AS IF SOMETHING AWFUL MIGHT HAPPEN: MORE THAN HALF THE DAYS
1. FEELING NERVOUS, ANXIOUS, OR ON EDGE: SEVERAL DAYS
8. IF YOU CHECKED OFF ANY PROBLEMS, HOW DIFFICULT HAVE THESE MADE IT FOR YOU TO DO YOUR WORK, TAKE CARE OF THINGS AT HOME, OR GET ALONG WITH OTHER PEOPLE?: SOMEWHAT DIFFICULT

## 2021-08-11 ENCOUNTER — VIRTUAL VISIT (OUTPATIENT)
Dept: FAMILY MEDICINE | Facility: CLINIC | Age: 26
End: 2021-08-11
Payer: COMMERCIAL

## 2021-08-11 DIAGNOSIS — F33.1 MODERATE EPISODE OF RECURRENT MAJOR DEPRESSIVE DISORDER (H): ICD-10-CM

## 2021-08-11 DIAGNOSIS — F41.1 GAD (GENERALIZED ANXIETY DISORDER): ICD-10-CM

## 2021-08-11 PROCEDURE — 99214 OFFICE O/P EST MOD 30 MIN: CPT | Mod: TEL | Performed by: NURSE PRACTITIONER

## 2021-08-11 RX ORDER — FLUOXETINE 40 MG/1
40 CAPSULE ORAL DAILY
Qty: 90 CAPSULE | Refills: 1 | Status: SHIPPED | OUTPATIENT
Start: 2021-08-11 | End: 2021-08-19

## 2021-08-11 ASSESSMENT — PATIENT HEALTH QUESTIONNAIRE - PHQ9: SUM OF ALL RESPONSES TO PHQ QUESTIONS 1-9: 7

## 2021-08-11 ASSESSMENT — ENCOUNTER SYMPTOMS: NERVOUS/ANXIOUS: 1

## 2021-08-11 ASSESSMENT — ANXIETY QUESTIONNAIRES: GAD7 TOTAL SCORE: 8

## 2021-08-11 NOTE — PATIENT INSTRUCTIONS
Patient Education   Please make an appointment to follow up with your therapist and/or Psychiatric Clinic (phone: [phone]) within 1-3 days.     Return to the ED if you are having worsening thoughts/feelings of harming yourself or others, or any urgent/life-threatening concerns.     DISCHARGE RESOURCES:    [facility] [phone]     [facility] [phone] (detox), [phone] (outpatient & [facility])      Crisis Intervention: [phone] or 513-931-0963 (TTY: 922.473.9711).  Call anytime.    Suicide Awareness Voices of Education (SAVE) (www.save.org): 715-556-YZFI (7909)    National Suicide Prevention Line (www.mentalhealthmn.org): 295-816-XQQP (4524)    National [place] on Mental Illness (www.mn.ginger.org): 373.915.3102 or 983-256-5452.    Hldn7xxej: text the word LIFE to 14007 for immediate support and crisis intervention    Mental Health Consumer/Survivor Network of [place] (www.mhcsn.net): 136.119.1587 or 962-356-7540    Mental Health Association [place] (www.mentalhealth.org): 860.560.1187 or 809-986-9924

## 2021-08-11 NOTE — PROGRESS NOTES
"Cathy is a 26 year old who is being evaluated via a billable telephone visit.      What phone number would you like to be contacted at? [phone]  How would you like to obtain your AVS? MyChart    Assessment & Plan     THANG (generalized anxiety disorder)    - FLUoxetine (PROZAC) 40 MG capsule; Take 1 capsule (40 mg) by mouth daily    Moderate episode of recurrent major depressive disorder (H)    - FLUoxetine (PROZAC) 40 MG capsule; Take 1 capsule (40 mg) by mouth daily    20 minutes spent on the date of the encounter doing chart review, history and exam, documentation and further activities per the note     BMI:   Estimated body mass index is 42.61 kg/m  as calculated from the following:    Height as of 8/20/20: 1.645 m (5' 4.76\").    Weight as of 2/17/21: 115.3 kg (254 lb 3.2 oz).   Weight management plan: Discussed healthy diet and exercise guidelines    See Patient Instructions: advised follow up as needed for worsening symptoms or other health care questions/ concerns; otherwise 6 months for medication check. Pt in agreement.     Return in about 6 months (around 2/11/2022), or if symptoms worsen or fail to improve.    Tala Monroe, VINICIUS  [facility] DELMARLON Morgan is a 26 year old who presents for the following health issues     She reports she has started the following supplements at home- vitamin D, vitamin c and fish oil (omega 3).    Anxiety    History of Present Illness       Mental Health Follow-up:  Patient presents to follow-up on Depression & Anxiety.Patient's depression since last visit has been:  Better  The patient is not having other symptoms associated with depression.  Patient's anxiety since last visit has been:  Better  The patient is not having other symptoms associated with anxiety.  Any significant life events: grief or loss  Patient is feeling anxious or having panic attacks.  Patient has no concerns about alcohol or drug use.     Social History  Tobacco Use "    Smoking status: Never Smoker    Smokeless tobacco: Never Used    Tobacco comment: Lives in smoke free household  Alcohol use: Yes    Comment: rare  Drug use: No      Today's PHQ-9         PHQ-9 Total Score:     (P) 7   PHQ-9 Q9 Thoughts of better off dead/self-harm past 2 weeks :   (P) Not at all   Thoughts of suicide or self harm:      Self-harm Plan:        Self-harm Action:          Safety concerns for self or others:           She eats 2-3 servings of fruits and vegetables daily.She consumes 2 sweetened beverage(s) daily.She exercises with enough effort to increase her heart rate 30 to 60 minutes per day.  She exercises with enough effort to increase her heart rate 5 days per week.   She is taking medications regularly.         Review of Systems   Psychiatric/Behavioral: The patient is nervous/anxious.       Constitutional, HEENT, cardiovascular, pulmonary, GI, , musculoskeletal, neuro, skin, endocrine and psych systems are negative, except as otherwise noted.      Objective           Vitals:  No vitals were obtained today due to virtual visit.    Physical Exam   healthy, alert and no distress  PSYCH: Alert and oriented times 3; coherent speech, normal   rate and volume, able to articulate logical thoughts, able   to abstract reason, no tangential thoughts, no hallucinations   or delusions  Her affect is normal  RESP: No cough, no audible wheezing, able to talk in full sentences  Remainder of exam unable to be completed due to telephone visits    See orders        Phone call duration: 12 minutes  Answers for HPI/ROS submitted by the patient on 8/10/2021  If you checked off any problems, how difficult have these problems made it for you to do your work, take care of things at home, or get along with other people?: Somewhat difficult  PHQ9 TOTAL SCORE: 7  THANG 7 TOTAL SCORE: 8

## 2021-08-19 ENCOUNTER — NURSE TRIAGE (OUTPATIENT)
Dept: NURSING | Facility: CLINIC | Age: 26
End: 2021-08-19

## 2021-08-19 DIAGNOSIS — F41.1 GAD (GENERALIZED ANXIETY DISORDER): ICD-10-CM

## 2021-08-19 DIAGNOSIS — F33.1 MODERATE EPISODE OF RECURRENT MAJOR DEPRESSIVE DISORDER (H): ICD-10-CM

## 2021-08-19 NOTE — TELEPHONE ENCOUNTER
Called patient and she said she has never taken 40 mg of fluoxetine and that there was a mistake at her appointment in February. She was previously taking 10 mg to titrate up to 20 mg and at her follow up in February the 20 mg capsules were sent in but the previous instructions remained on the prescription. She stated the 20 mg has been working for her and this is what she has been consistently taking. She request the fluoxetine 20 mg capsules be sent in for her.     Routed to covering providers.     Thank you,   Nereida Vu RN

## 2021-08-19 NOTE — TELEPHONE ENCOUNTER
Cathy is calling and states that she feels there was a mistake made in her chart.  Patient states that she had a virtual appointment on August 11th over the phone.  Cathy needed a prescription for Fluoxetine and Tala Monroe doubled her dose.  Today patient is requesting to go back to 20 mg Fluoxetine.  Patient feels that sheis fine on 20 mg and perhaps an old prescription for using up to 40 mg was in her chart and some how they got mixed up.  Please phone Cathy at [phone].  Pharmacy of choice is [facility] on [address] in [facility].      COVID 19 Nurse Triage Plan/Patient Instructions    Please be aware that novel coronavirus (COVID-19) may be circulating in the community. If you develop symptoms such as fever, cough, or SOB or if you have concerns about the presence of another infection including coronavirus (COVID-19), please contact your health care provider or visit [email or web address].     Disposition/Instructions    Home care recommended. Follow home care protocol based instructions.    Thank you for taking steps to prevent the spread of this virus.  o Limit your contact with others.  o Wear a simple mask to cover your cough.  o Wash your hands well and often.    Resources    [facility]: About COVID-19: [email or web address]    CDC: What to Do If You're Sick: www.cdc.gov/coronavirus/2019-ncov/about/steps-when-sick.html    CDC: Ending Home Isolation: www.cdc.gov/coronavirus/2019-ncov/hcp/disposition-in-home-patients.html     CDC: Caring for Someone: www.cdc.gov/coronavirus/2019-ncov/if-you-are-sick/care-for-someone.html     [facility]: Interim Guidance for Hospital Discharge to Home: www.health.[facility].[place].us/diseases/coronavirus/hcp/hospdischarge.pdf    [facility] clinical trials (COVID-19 research studies): [email or web address]     Below are the COVID-19 hotlines at the [place] Department of Health ([facility]). Interpreters are available.   o For  health questions: Call 174-644-2745 or 1-217.364.8179 (7 a.m. to 7 p.m.)  o For questions about schools and childcare: Call 974-777-6631 or 1-709.463.2883 (7 a.m. to 7 p.m.)                     Reason for Disposition    Caller requesting a NON-URGENT new prescription or refill and triager unable to refill per department policy    Additional Information    Negative: MORE THAN A DOUBLE DOSE of a prescription or over-the-counter (OTC) drug    Negative: DOUBLE DOSE (an extra dose or lesser amount) of over-the-counter (OTC) drug and any symptoms (e.g., dizziness, nausea, pain, sleepiness)    Negative: DOUBLE DOSE (an extra dose or lesser amount) of prescription drug and any symptoms (e.g., dizziness, nausea, pain, sleepiness)    Negative: Took another person's prescription drug    Negative: DOUBLE DOSE (an extra dose or lesser amount) of prescription drug and NO symptoms (Exception: a double dose of antibiotics)    Negative: Diabetes drug error or overdose (e.g., took wrong type of insulin or took extra dose)    Negative: Caller has medication question about med not prescribed by PCP and triager unable to answer question (e.g., compatibility with other med, storage)    Negative: Request for URGENT new prescription or refill of 'essential' medication (i.e., likelihood of harm to patient if not taken) and triager unable to fill per department policy    Negative: Prescription not at pharmacy and was prescribed today by PCP    Negative: Pharmacy calling with prescription questions and triager unable to answer question    Negative: Caller has urgent medication question about med that PCP prescribed and triager unable to answer question    Negative: Caller has NON-URGENT medication question about med that PCP prescribed and triager unable to answer question    Protocols used: MEDICATION QUESTION CALL-A-OH

## 2021-09-12 ENCOUNTER — HEALTH MAINTENANCE LETTER (OUTPATIENT)
Age: 26
End: 2021-09-12

## 2022-01-18 ENCOUNTER — TELEPHONE (OUTPATIENT)
Dept: FAMILY MEDICINE | Facility: CLINIC | Age: 27
End: 2022-01-18
Payer: COMMERCIAL

## 2022-01-18 ASSESSMENT — ENCOUNTER SYMPTOMS
DIZZINESS: 0
DYSURIA: 0
HEARTBURN: 0
JOINT SWELLING: 0
WEAKNESS: 0
ARTHRALGIAS: 0
DIARRHEA: 0
CHILLS: 0
SORE THROAT: 0
FEVER: 0
FREQUENCY: 0
PARESTHESIAS: 0
SHORTNESS OF BREATH: 0
NAUSEA: 0
HEMATURIA: 0
EYE PAIN: 0
BREAST MASS: 0
PALPITATIONS: 0
ABDOMINAL PAIN: 0
HEADACHES: 0
CONSTIPATION: 0
MYALGIAS: 0
NERVOUS/ANXIOUS: 1
HEMATOCHEZIA: 0
COUGH: 0

## 2022-01-18 NOTE — TELEPHONE ENCOUNTER
Reason for Call:  Form, our goal is to have forms completed with 72 hours, however, some forms may require a visit or additional information.    Type of letter, form or note:  [employer] plasma     Who is the form from?: [employer]    Where did the form come from: form was faxed in    Where the form was placed: Given to physician    What number is listed as a contact on the form?: [phone]/fax [phone]       Additional comments: appt on 01/19/22 Carlita Doe    Call taken on 1/18/2022 at 6:00 PM by Amarilis Kinney

## 2022-01-19 ENCOUNTER — OFFICE VISIT (OUTPATIENT)
Dept: FAMILY MEDICINE | Facility: CLINIC | Age: 27
End: 2022-01-19
Payer: COMMERCIAL

## 2022-01-19 VITALS
BODY MASS INDEX: 43.07 KG/M2 | TEMPERATURE: 97.9 F | OXYGEN SATURATION: 98 % | SYSTOLIC BLOOD PRESSURE: 116 MMHG | DIASTOLIC BLOOD PRESSURE: 84 MMHG | HEART RATE: 87 BPM | WEIGHT: 268 LBS | HEIGHT: 66 IN

## 2022-01-19 DIAGNOSIS — Z00.00 ROUTINE GENERAL MEDICAL EXAMINATION AT A HEALTH CARE FACILITY: Primary | ICD-10-CM

## 2022-01-19 DIAGNOSIS — Z13.6 CARDIOVASCULAR SCREENING; LDL GOAL LESS THAN 160: ICD-10-CM

## 2022-01-19 DIAGNOSIS — Q25.3 SUPRAVALVAR AORTIC STENOSIS: ICD-10-CM

## 2022-01-19 DIAGNOSIS — Z12.4 CERVICAL CANCER SCREENING: ICD-10-CM

## 2022-01-19 DIAGNOSIS — E03.4 HYPOTHYROIDISM DUE TO ACQUIRED ATROPHY OF THYROID: ICD-10-CM

## 2022-01-19 DIAGNOSIS — Z11.59 NEED FOR HEPATITIS C SCREENING TEST: ICD-10-CM

## 2022-01-19 DIAGNOSIS — F41.1 GAD (GENERALIZED ANXIETY DISORDER): ICD-10-CM

## 2022-01-19 DIAGNOSIS — Z11.4 SCREENING FOR HIV (HUMAN IMMUNODEFICIENCY VIRUS): ICD-10-CM

## 2022-01-19 DIAGNOSIS — F33.1 MODERATE EPISODE OF RECURRENT MAJOR DEPRESSIVE DISORDER (H): ICD-10-CM

## 2022-01-19 LAB
CHOLEST SERPL-MCNC: 204 MG/DL
FASTING STATUS PATIENT QL REPORTED: YES
FASTING STATUS PATIENT QL REPORTED: YES
GLUCOSE BLD-MCNC: 98 MG/DL (ref 70–99)
HCV AB SERPL QL IA: NONREACTIVE
HDLC SERPL-MCNC: 48 MG/DL
HIV 1+2 AB+HIV1 P24 AG SERPL QL IA: NONREACTIVE
LDLC SERPL CALC-MCNC: 134 MG/DL
NONHDLC SERPL-MCNC: 156 MG/DL
TRIGL SERPL-MCNC: 111 MG/DL

## 2022-01-19 PROCEDURE — 87389 HIV-1 AG W/HIV-1&-2 AB AG IA: CPT | Performed by: PHYSICIAN ASSISTANT

## 2022-01-19 PROCEDURE — 96127 BRIEF EMOTIONAL/BEHAV ASSMT: CPT | Performed by: PHYSICIAN ASSISTANT

## 2022-01-19 PROCEDURE — G0145 SCR C/V CYTO,THINLAYER,RESCR: HCPCS | Performed by: PHYSICIAN ASSISTANT

## 2022-01-19 PROCEDURE — 36415 COLL VENOUS BLD VENIPUNCTURE: CPT | Performed by: PHYSICIAN ASSISTANT

## 2022-01-19 PROCEDURE — 86803 HEPATITIS C AB TEST: CPT | Performed by: PHYSICIAN ASSISTANT

## 2022-01-19 PROCEDURE — 82947 ASSAY GLUCOSE BLOOD QUANT: CPT | Performed by: PHYSICIAN ASSISTANT

## 2022-01-19 PROCEDURE — 99214 OFFICE O/P EST MOD 30 MIN: CPT | Mod: 25 | Performed by: PHYSICIAN ASSISTANT

## 2022-01-19 PROCEDURE — 80061 LIPID PANEL: CPT | Performed by: PHYSICIAN ASSISTANT

## 2022-01-19 PROCEDURE — 99395 PREV VISIT EST AGE 18-39: CPT | Performed by: PHYSICIAN ASSISTANT

## 2022-01-19 RX ORDER — VITAMIN B COMPLEX
50 TABLET ORAL DAILY
Qty: 100 TABLET | Refills: 3 | COMMUNITY

## 2022-01-19 RX ORDER — LEVOTHYROXINE SODIUM 25 UG/1
37.5 TABLET ORAL DAILY
Qty: 135 TABLET | Refills: 0 | Status: SHIPPED | OUTPATIENT
Start: 2022-01-19 | End: 2022-04-01

## 2022-01-19 RX ORDER — AMOXICILLIN 500 MG/1
2000 TABLET, FILM COATED ORAL
COMMUNITY
Start: 2021-12-13

## 2022-01-19 ASSESSMENT — ENCOUNTER SYMPTOMS
PALPITATIONS: 0
HEMATURIA: 0
HEARTBURN: 0
SHORTNESS OF BREATH: 0
NERVOUS/ANXIOUS: 1
FREQUENCY: 0
ARTHRALGIAS: 0
DIARRHEA: 0
ABDOMINAL PAIN: 0
BREAST MASS: 0
HEADACHES: 0
MYALGIAS: 0
CHILLS: 0
NAUSEA: 0
CONSTIPATION: 0
DYSURIA: 0
DIZZINESS: 0
WEAKNESS: 0
EYE PAIN: 0
SORE THROAT: 0
HEMATOCHEZIA: 0
FEVER: 0
PARESTHESIAS: 0
JOINT SWELLING: 0
COUGH: 0

## 2022-01-19 ASSESSMENT — ANXIETY QUESTIONNAIRES
GAD7 TOTAL SCORE: 6
7. FEELING AFRAID AS IF SOMETHING AWFUL MIGHT HAPPEN: SEVERAL DAYS
6. BECOMING EASILY ANNOYED OR IRRITABLE: MORE THAN HALF THE DAYS
5. BEING SO RESTLESS THAT IT IS HARD TO SIT STILL: NOT AT ALL
1. FEELING NERVOUS, ANXIOUS, OR ON EDGE: SEVERAL DAYS
IF YOU CHECKED OFF ANY PROBLEMS ON THIS QUESTIONNAIRE, HOW DIFFICULT HAVE THESE PROBLEMS MADE IT FOR YOU TO DO YOUR WORK, TAKE CARE OF THINGS AT HOME, OR GET ALONG WITH OTHER PEOPLE: SOMEWHAT DIFFICULT
3. WORRYING TOO MUCH ABOUT DIFFERENT THINGS: SEVERAL DAYS
2. NOT BEING ABLE TO STOP OR CONTROL WORRYING: SEVERAL DAYS

## 2022-01-19 ASSESSMENT — PATIENT HEALTH QUESTIONNAIRE - PHQ9
5. POOR APPETITE OR OVEREATING: NOT AT ALL
SUM OF ALL RESPONSES TO PHQ QUESTIONS 1-9: 13

## 2022-01-19 ASSESSMENT — MIFFLIN-ST. JEOR: SCORE: 1974.45

## 2022-01-19 NOTE — PROGRESS NOTES
SUBJECTIVE:   CC: Cathy Galloway is an 26 year old woman who presents for preventive health visit.         Healthy Habits:     Getting at least 3 servings of Calcium per day:  Yes    Bi-annual eye exam:  NO    Dental care twice a year:  Yes    Sleep apnea or symptoms of sleep apnea:  None    Diet:  Vegetarian/vegan    Frequency of exercise:  2-3 days/week    Duration of exercise:  15-30 minutes    Taking medications regularly:  Yes    Medication side effects:  None    PHQ-2 Total Score: 2    Additional concerns today:  Yes      Was getting low iron levels in past when donating blood.  She is no longer donating (due to meds) but still on iron and tolerating it well.     On prozac for about 1 year.  Takes this Has tried lexapro in the past for about 4 years.          Hypothyroidism Follow-up      Since last visit, patient describes the following symptoms: Weight stable, no hair loss, no skin changes, no constipation, no loose stools    She has been off her medication for 1-2 months.  Will notice irregular menses when she goes on and off this.       Today's PHQ-2 Score:   PHQ-2 ( 1999 [employer]) 1/18/2022   Q1: Little interest or pleasure in doing things 1   Q2: Feeling down, depressed or hopeless 1   PHQ-2 Score 2   Q1: Little interest or pleasure in doing things Several days   Q2: Feeling down, depressed or hopeless Several days   PHQ-2 Score 2       Abuse: Current or Past (Physical, Sexual or Emotional) - No  Do you feel safe in your environment? Yes    Have you ever done Advance Care Planning? (For example, a Health Directive, POLST, or a discussion with a medical provider or your loved ones about your wishes): No, advance care planning information given to patient to review.  Patient declined advance care planning discussion at this time.    Social History     Tobacco Use     Smoking status: Never Smoker     Smokeless tobacco: Never Used     Tobacco comment: Lives in smoke free household   Substance Use  Topics     Alcohol use: Yes     Comment: rare         Alcohol Use 1/18/2022   Prescreen: >3 drinks/day or >7 drinks/week? No   Prescreen: >3 drinks/day or >7 drinks/week? -       Reviewed orders with patient.  Reviewed health maintenance and updated orders accordingly - Yes  Lab work is in process  Labs reviewed in EPIC  BP Readings from Last 3 Encounters:   01/19/22 116/84   02/17/21 110/62   01/11/21 96/63    Wt Readings from Last 3 Encounters:   01/19/22 121.6 kg (268 lb)   02/17/21 115.3 kg (254 lb 3.2 oz)   01/11/21 117.6 kg (259 lb 3.2 oz)                    Breast Cancer Screening:        History of abnormal Pap smear: NO - age 21-29 PAP every 3 years recommended     Reviewed and updated as needed this visit by clinical staff  Tobacco  Allergies  Meds  Problems            Reviewed and updated as needed this visit by Provider    Meds  Problems               Review of Systems   Constitutional: Negative for chills and fever.   HENT: Negative for congestion, ear pain, hearing loss and sore throat.    Eyes: Negative for pain and visual disturbance.   Respiratory: Negative for cough and shortness of breath.    Cardiovascular: Negative for chest pain, palpitations and peripheral edema.   Gastrointestinal: Negative for abdominal pain, constipation, diarrhea, heartburn, hematochezia and nausea.   Breasts:  Negative for tenderness, breast mass and discharge.   Genitourinary: Positive for vaginal discharge. Negative for dysuria, frequency, genital sores, hematuria, pelvic pain, urgency and vaginal bleeding.   Musculoskeletal: Negative for arthralgias, joint swelling and myalgias.   Skin: Negative for rash.   Neurological: Negative for dizziness, weakness, headaches and paresthesias.   Psychiatric/Behavioral: Negative for mood changes. The patient is nervous/anxious.      CONSTITUTIONAL: NEGATIVE for fever, chills, change in weight  INTEGUMENTARU/SKIN: NEGATIVE for worrisome rashes, moles or lesions  EYES: NEGATIVE  "for vision changes or irritation  ENT: NEGATIVE for ear, mouth and throat problems  RESP: NEGATIVE for significant cough or SOB  BREAST: NEGATIVE for masses, tenderness or discharge  CV: NEGATIVE for chest pain, palpitations or peripheral edema  GI: NEGATIVE for nausea, abdominal pain, heartburn, or change in bowel habits  : NEGATIVE for unusual urinary or vaginal symptoms. Periods are regular.  MUSCULOSKELETAL: NEGATIVE for significant arthralgias or myalgia  NEURO: NEGATIVE for weakness, dizziness or paresthesias  PSYCHIATRIC: NEGATIVE for changes in mood or affect     OBJECTIVE:   /84 (BP Location: Left arm, Patient Position: Chair, Cuff Size: Adult Large)   Pulse 87   Temp 97.9  F (36.6  C) (Tympanic)   Ht 1.664 m (5' 5.5\")   Wt 121.6 kg (268 lb)   LMP 11/16/2021 (Approximate)   SpO2 98%   Breastfeeding No   BMI 43.92 kg/m    Physical Exam  GENERAL: healthy, alert and no distress  EYES: Eyes grossly normal to inspection, PERRL and conjunctivae and sclerae normal  HENT: ear canals and TM's normal, nose and mouth without ulcers or lesions  NECK: no adenopathy, no asymmetry, masses, or scars and thyroid normal to palpation  RESP: lungs clear to auscultation - no rales, rhonchi or wheezes  BREAST: normal without masses, tenderness or nipple discharge and no palpable axillary masses or adenopathy  CV: regular rate and rhythm, normal S1 S2, no S3 or S4, no murmur, click or rub, no peripheral edema and peripheral pulses strong  ABDOMEN: soft, nontender, no hepatosplenomegaly, no masses and bowel sounds normal  MS: no gross musculoskeletal defects noted, no edema  SKIN: no suspicious lesions or rashes  NEURO: Normal strength and tone, mentation intact and speech normal  PSYCH: mentation appears normal, affect normal/bright    Diagnostic Test Results:  Labs reviewed in Epic    ASSESSMENT/PLAN:   (Z00.00) Routine general medical examination at a health care facility  (primary encounter diagnosis)  Comment: "      HEALTH CARE MAINTENANCE              Reviewed USPTF recommendations and anticipatory guidance.              See orders.   I discussed in detail with Cathy Galloway the importance of cervical cancer screenings through pap smears, will repeat pap every 3 year(s).        This was her first pap smear.  I took extra time today showing her what this test involves and why it is important to be screened on a regular basis.      Forms for plasma completed.       I recommend you get a covid booster as soon as possible to help bring back up your immunity against this disease.      Plan: Glucose            (E03.4) Hypothyroidism due to acquired atrophy of thyroid  Comment: not currently on medication, will restart 1.5 tabs and recheck non-fasting labs in 2-3 months.  She has been on the current dose without changes for years.   Plan: levothyroxine (SYNTHROID/LEVOTHROID) 25 MCG         tablet            (F41.1) THANG (generalized anxiety disorder)  Comment: doing well on prozac, will leave as is.   Plan: FLUoxetine (PROZAC) 20 MG capsule            (F33.1) Moderate episode of recurrent major depressive disorder (H)  Comment: doing well on prozac, will leave as is  Plan: FLUoxetine (PROZAC) 20 MG capsule            (Z11.4) Screening for HIV (human immunodeficiency virus)  Comment: Discussed CDC guidelines on preventative screening for this condition.    Patient would like screening done.     Plan: HIV Antigen Antibody Combo            (Z11.59) Need for hepatitis C screening test  Comment: Discussed CDC guidelines on preventative screening for this condition.    Patient would like screening done.      Plan: Hepatitis C Screen Reflex to HCV RNA Quant and         Genotype            (Z12.4) Cervical cancer screening  Comment: This was her first pap smear.  I took extra time today showing her what this test involves and why it is important to be screened on a regular basis.  Plan: Pap Screen only - recommended age 21 - 24  "years            (Z13.6) CARDIOVASCULAR SCREENING; LDL GOAL LESS THAN 160  Comment:   Plan: Lipid panel reflex to direct LDL Fasting            (Q25.3) Supravalvar aortic stenosis  Comment: seen by cardiology 2021 and echo/EKG stable.  Told to f/u in a year.  Forms for plasma donation filled out     Plan:     Patient has been advised of split billing requirements and indicates understanding: Yes    COUNSELING:  Reviewed preventive health counseling, as reflected in patient instructions       Regular exercise       Healthy diet/nutrition       Consider Hep C screening for all patients one time for ages 18-79 years       HIV screeninx in teen years, 1x in adult years, and at intervals if high risk    Estimated body mass index is 43.92 kg/m  as calculated from the following:    Height as of this encounter: 1.664 m (5' 5.5\").    Weight as of this encounter: 121.6 kg (268 lb).    Weight management plan: Discussed healthy diet and exercise guidelines    She reports that she has never smoked. She has never used smokeless tobacco.      Counseling Resources:  ATP IV Guidelines  Pooled Cohorts Equation Calculator  Breast Cancer Risk Calculator  BRCA-Related Cancer Risk Assessment: FHS-7 Tool  FRAX Risk Assessment  ICSI Preventive Guidelines  Dietary Guidelines for Americans,   USDA's MyPlate  ASA Prophylaxis  Lung CA Screening    HAYDEE Narvaez [facility] DEL  "

## 2022-01-19 NOTE — PATIENT INSTRUCTIONS
I recommend you get a covid booster as soon as possible to help bring back up your immunity against this disease.        Preventive Health Recommendations  Female Ages 21 to 25     Yearly exam:     See your health care provider every year in order to  o Review health changes.   o Discuss preventive care.    o Review your medicines if your doctor has prescribed any.      You should be tested each year for STDs (sexually transmitted diseases).       Talk to your provider about how often you should have cholesterol testing.      Get a Pap test every three years. If you have an abnormal result, your doctor may have you test more often.      If you are at risk for diabetes, you should have a diabetes test (fasting glucose).     Shots:     Get a flu shot each year.     Get a tetanus shot every 10 years.     Consider getting the shot (vaccine) that prevents cervical cancer (Gardasil).    Nutrition:     Eat at least 5 servings of fruits and vegetables each day.    Eat whole-grain bread, whole-wheat pasta and brown rice instead of white grains and rice.    Get adequate Calcium and Vitamin D.     Lifestyle    Exercise at least 150 minutes a week each week (30 minutes a day, 5 days a week). This will help you control your weight and prevent disease.    Limit alcohol to one drink per day.    No smoking.     Wear sunscreen to prevent skin cancer.    See your dentist every six months for an exam and cleaning.

## 2022-01-20 ASSESSMENT — ANXIETY QUESTIONNAIRES: GAD7 TOTAL SCORE: 6

## 2022-01-21 LAB
BKR LAB AP GYN ADEQUACY: NORMAL
BKR LAB AP GYN INTERPRETATION: NORMAL
BKR LAB AP HPV REFLEX: NO
BKR LAB AP PREVIOUS ABNORMAL: NORMAL
PATH REPORT.COMMENTS IMP SPEC: NORMAL
PATH REPORT.COMMENTS IMP SPEC: NORMAL
PATH REPORT.RELEVANT HX SPEC: NORMAL

## 2022-03-30 ENCOUNTER — LAB (OUTPATIENT)
Dept: LAB | Facility: CLINIC | Age: 27
End: 2022-03-30
Payer: COMMERCIAL

## 2022-03-30 DIAGNOSIS — E03.4 HYPOTHYROIDISM DUE TO ACQUIRED ATROPHY OF THYROID: ICD-10-CM

## 2022-03-30 LAB — TSH SERPL DL<=0.005 MIU/L-ACNC: 2.46 MU/L (ref 0.4–4)

## 2022-03-30 PROCEDURE — 84443 ASSAY THYROID STIM HORMONE: CPT

## 2022-03-30 PROCEDURE — 36415 COLL VENOUS BLD VENIPUNCTURE: CPT

## 2022-04-01 DIAGNOSIS — E03.4 HYPOTHYROIDISM DUE TO ACQUIRED ATROPHY OF THYROID: ICD-10-CM

## 2022-04-01 RX ORDER — LEVOTHYROXINE SODIUM 25 UG/1
37.5 TABLET ORAL DAILY
Qty: 135 TABLET | Refills: 3 | Status: SHIPPED | OUTPATIENT
Start: 2022-04-01 | End: 2023-05-03

## 2022-07-24 DIAGNOSIS — F33.1 MODERATE EPISODE OF RECURRENT MAJOR DEPRESSIVE DISORDER (H): ICD-10-CM

## 2022-07-24 DIAGNOSIS — F41.1 GAD (GENERALIZED ANXIETY DISORDER): ICD-10-CM

## 2022-07-25 NOTE — TELEPHONE ENCOUNTER
Routing refill request to provider for review/approval because:  Failed protocols      Nereida Vu RN

## 2022-11-19 ENCOUNTER — HEALTH MAINTENANCE LETTER (OUTPATIENT)
Age: 27
End: 2022-11-19

## 2023-01-17 DIAGNOSIS — F33.1 MODERATE EPISODE OF RECURRENT MAJOR DEPRESSIVE DISORDER (H): ICD-10-CM

## 2023-01-17 DIAGNOSIS — F41.1 GAD (GENERALIZED ANXIETY DISORDER): ICD-10-CM

## 2023-04-09 ENCOUNTER — HEALTH MAINTENANCE LETTER (OUTPATIENT)
Age: 28
End: 2023-04-09

## 2023-04-16 NOTE — LETTER
My Depression Action Plan  Name: Cathy Galloway   Date of Birth 1995  Date: 7/17/2018    My doctor: Vijay Hammonds   [facility]  [address]  [place] [id number] [phone]          GREEN    ZONE   Good Control    What it looks like:     Things are going generally well. You have normal up s and down s. You may even feel depressed from time to time, but bad moods usually last less than a day.   What you need to do:  1. Continue to care for yourself (see self care plan)  2. Check your depression survival kit and update it as needed  3. Follow your physician s recommendations including any medication.  4. Do not stop taking medication unless you consult with your physician first.           YELLOW         ZONE Getting Worse    What it looks like:     Depression is starting to interfere with your life.     It may be hard to get out of bed; you may be starting to isolate yourself from others.    Symptoms of depression are starting to last most all day and this has happened for several days.     You may have suicidal thoughts but they are not constant.   What you need to do:     1. Call your care team, your response to treatment will improve if you keep your care team informed of your progress. Yellow periods are signs an adjustment may need to be made.     2. Continue your self-care, even if you have to fake it!    3. Talk to someone in your support network    4. Open up your depression survival kit           RED    ZONE Medical Alert - Get Help    What it looks like:     Depression is seriously interfering with your life.     You may experience these or other symptoms: You can t get out of bed most days, can t work or engage in other necessary activities, you have trouble taking care of basic hygiene, or basic responsibilities, thoughts of suicide or death that will not go away, self-injurious behavior.     What you need to do:  1. Call your care team  and request a same-day appointment. If they are not available (weekends or after hours) call your local crisis line, emergency room or 911.            Depression Self Care Plan / Survival Kit    Self-Care for Depression  Here s the deal. Your body and mind are really not as separate as most people think.  What you do and think affects how you feel and how you feel influences what you do and think. This means if you do things that people who feel good do, it will help you feel better.  Sometimes this is all it takes.  There is also a place for medication and therapy depending on how severe your depression is, so be sure to consult with your medical provider and/ or Behavioral Health Consultant if your symptoms are worsening or not improving.     In order to better manage my stress, I will:    Exercise  Get some form of exercise, every day. This will help reduce pain and release endorphins, the  feel good  chemicals in your brain. This is almost as good as taking antidepressants!  This is not the same as joining a gym and then never going! (they count on that by the way ) It can be as simple as just going for a walk or doing some gardening, anything that will get you moving.      Hygiene   Maintain good hygiene (Get out of bed in the morning, Make your bed, Brush your teeth, Take a shower, and Get dressed like you were going to work, even if you are unemployed).  If your clothes don't fit try to get ones that do.    Diet  I will strive to eat foods that are good for me, drink plenty of water, and avoid excessive sugar, caffeine, alcohol, and other mood-altering substances.  Some foods that are helpful in depression are: complex carbohydrates, B vitamins, flaxseed, fish or fish oil, fresh fruits and vegetables.    Psychotherapy  I agree to participate in Individual Therapy (if recommended).    Medication  If prescribed medications, I agree to take them.  Missing doses can result in serious side effects.  I understand  that drinking alcohol, or other illicit drug use, may cause potential side effects.  I will not stop my medication abruptly without first discussing it with my provider.    Staying Connected With Others  I will stay in touch with my friends, family members, and my primary care provider/team.    Use your imagination  Be creative.  We all have a creative side; it doesn t matter if it s oil painting, sand castles, or mud pies! This will also kick up the endorphins.    Witness Beauty  (AKA stop and smell the roses) Take a look outside, even in mid-winter. Notice colors, textures. Watch the squirrels and birds.     Service to others  Be of service to others.  There is always someone else in need.  By helping others we can  get out of ourselves  and remember the really important things.  This also provides opportunities for practicing all the other parts of the program.    Humor  Laugh and be silly!  Adjust your TV habits for less news and crime-drama and more comedy.    Control your stress  Try breathing deep, massage therapy, biofeedback, and meditation. Find time to relax each day.     My support system    Clinic Contact:  Phone number:    Contact 1:  Phone number:    Contact 2:  Phone number:    [place]/:  Phone number:    Therapist:  Phone number:    Local crisis center:    Phone number:    Other community support:  Phone number:       patient

## 2023-06-11 ASSESSMENT — PATIENT HEALTH QUESTIONNAIRE - PHQ9: SUM OF ALL RESPONSES TO PHQ QUESTIONS 1-9: 11

## 2023-06-12 DIAGNOSIS — F33.1 MODERATE EPISODE OF RECURRENT MAJOR DEPRESSIVE DISORDER (H): ICD-10-CM

## 2023-06-12 DIAGNOSIS — F41.1 GAD (GENERALIZED ANXIETY DISORDER): ICD-10-CM

## 2023-06-25 ASSESSMENT — PATIENT HEALTH QUESTIONNAIRE - PHQ9
SUM OF ALL RESPONSES TO PHQ QUESTIONS 1-9: 14
SUM OF ALL RESPONSES TO PHQ QUESTIONS 1-9: 14
10. IF YOU CHECKED OFF ANY PROBLEMS, HOW DIFFICULT HAVE THESE PROBLEMS MADE IT FOR YOU TO DO YOUR WORK, TAKE CARE OF THINGS AT HOME, OR GET ALONG WITH OTHER PEOPLE: SOMEWHAT DIFFICULT

## 2023-06-25 ASSESSMENT — ENCOUNTER SYMPTOMS
DIARRHEA: 0
SHORTNESS OF BREATH: 0
EYE PAIN: 0
JOINT SWELLING: 0
COUGH: 0
ABDOMINAL PAIN: 0
DIZZINESS: 0
NERVOUS/ANXIOUS: 1
SORE THROAT: 0
PALPITATIONS: 0
BREAST MASS: 0
ARTHRALGIAS: 0
CONSTIPATION: 0
HEARTBURN: 0
CHILLS: 0
NAUSEA: 0
HEMATURIA: 0
MYALGIAS: 0
DYSURIA: 0
PARESTHESIAS: 0
WEAKNESS: 0
HEMATOCHEZIA: 0
FREQUENCY: 0
FEVER: 0
HEADACHES: 0

## 2023-06-26 ENCOUNTER — OFFICE VISIT (OUTPATIENT)
Dept: FAMILY MEDICINE | Facility: CLINIC | Age: 28
End: 2023-06-26
Payer: COMMERCIAL

## 2023-06-26 VITALS
HEIGHT: 65 IN | WEIGHT: 257 LBS | BODY MASS INDEX: 42.82 KG/M2 | TEMPERATURE: 97.6 F | DIASTOLIC BLOOD PRESSURE: 70 MMHG | SYSTOLIC BLOOD PRESSURE: 126 MMHG | OXYGEN SATURATION: 94 % | HEART RATE: 79 BPM | RESPIRATION RATE: 20 BRPM

## 2023-06-26 DIAGNOSIS — F41.1 GAD (GENERALIZED ANXIETY DISORDER): ICD-10-CM

## 2023-06-26 DIAGNOSIS — Z00.00 ROUTINE GENERAL MEDICAL EXAMINATION AT A HEALTH CARE FACILITY: Primary | ICD-10-CM

## 2023-06-26 DIAGNOSIS — E03.4 HYPOTHYROIDISM DUE TO ACQUIRED ATROPHY OF THYROID: ICD-10-CM

## 2023-06-26 DIAGNOSIS — F33.1 MODERATE EPISODE OF RECURRENT MAJOR DEPRESSIVE DISORDER (H): ICD-10-CM

## 2023-06-26 LAB — TSH SERPL DL<=0.005 MIU/L-ACNC: 1.69 UIU/ML (ref 0.3–4.2)

## 2023-06-26 PROCEDURE — 99395 PREV VISIT EST AGE 18-39: CPT | Performed by: PHYSICIAN ASSISTANT

## 2023-06-26 PROCEDURE — 36415 COLL VENOUS BLD VENIPUNCTURE: CPT | Performed by: PHYSICIAN ASSISTANT

## 2023-06-26 PROCEDURE — 84443 ASSAY THYROID STIM HORMONE: CPT | Performed by: PHYSICIAN ASSISTANT

## 2023-06-26 PROCEDURE — 99214 OFFICE O/P EST MOD 30 MIN: CPT | Mod: 25 | Performed by: PHYSICIAN ASSISTANT

## 2023-06-26 RX ORDER — LEVOTHYROXINE SODIUM 25 UG/1
TABLET ORAL
Qty: 135 TABLET | Refills: 3 | Status: SHIPPED | OUTPATIENT
Start: 2023-06-26 | End: 2024-07-23

## 2023-06-26 ASSESSMENT — ENCOUNTER SYMPTOMS
FREQUENCY: 0
BREAST MASS: 0
HEARTBURN: 0
FEVER: 0
MYALGIAS: 0
PALPITATIONS: 0
ABDOMINAL PAIN: 0
JOINT SWELLING: 0
NAUSEA: 0
DIARRHEA: 0
DIZZINESS: 0
HEADACHES: 0
CHILLS: 0
ARTHRALGIAS: 0
DYSURIA: 0
CONSTIPATION: 0
HEMATOCHEZIA: 0
WEAKNESS: 0
SHORTNESS OF BREATH: 0
HEMATURIA: 0
COUGH: 0
EYE PAIN: 0
PARESTHESIAS: 0

## 2023-06-26 ASSESSMENT — ANXIETY QUESTIONNAIRES
GAD7 TOTAL SCORE: 12
8. IF YOU CHECKED OFF ANY PROBLEMS, HOW DIFFICULT HAVE THESE MADE IT FOR YOU TO DO YOUR WORK, TAKE CARE OF THINGS AT HOME, OR GET ALONG WITH OTHER PEOPLE?: SOMEWHAT DIFFICULT
3. WORRYING TOO MUCH ABOUT DIFFERENT THINGS: MORE THAN HALF THE DAYS
GAD7 TOTAL SCORE: 12
4. TROUBLE RELAXING: SEVERAL DAYS
7. FEELING AFRAID AS IF SOMETHING AWFUL MIGHT HAPPEN: MORE THAN HALF THE DAYS
5. BEING SO RESTLESS THAT IT IS HARD TO SIT STILL: SEVERAL DAYS
7. FEELING AFRAID AS IF SOMETHING AWFUL MIGHT HAPPEN: MORE THAN HALF THE DAYS
IF YOU CHECKED OFF ANY PROBLEMS ON THIS QUESTIONNAIRE, HOW DIFFICULT HAVE THESE PROBLEMS MADE IT FOR YOU TO DO YOUR WORK, TAKE CARE OF THINGS AT HOME, OR GET ALONG WITH OTHER PEOPLE: SOMEWHAT DIFFICULT
6. BECOMING EASILY ANNOYED OR IRRITABLE: MORE THAN HALF THE DAYS
2. NOT BEING ABLE TO STOP OR CONTROL WORRYING: MORE THAN HALF THE DAYS
1. FEELING NERVOUS, ANXIOUS, OR ON EDGE: MORE THAN HALF THE DAYS

## 2023-06-26 NOTE — PROGRESS NOTES
SUBJECTIVE:   CC: Cathy is an 27 year old who presents for preventive health visit.       6/26/2023     2:07 PM   Additional Questions   Roomed by LORAINE   Accompanied by SIM         6/26/2023     2:07 PM   Patient Reported Additional Medications   Patient reports taking the following new medications None per patient     Healthy Habits:     Getting at least 3 servings of Calcium per day:  Yes    Bi-annual eye exam:  NO    Dental care twice a year:  Yes    Sleep apnea or symptoms of sleep apnea:  None    Diet:  Vegetarian/vegan    Frequency of exercise:  2-3 days/week    Duration of exercise:  30-45 minutes    Taking medications regularly:  Yes    Medication side effects:  None    PHQ-2 Total Score: 4    Additional concerns today:  No        Patient would still like to discuss the following concern(s):  1. Refill medications  Recheck of miguelina/dep . Doing well overall. prozac working well. No anhedonia. Sleeping well. Occasional /rare panic attacks.     Recheck of her hypothyroidism. No symptoms such as fatigue/wgt gain/constipation.           Social History     Tobacco Use     Smoking status: Never     Passive exposure: Never     Smokeless tobacco: Never     Tobacco comments:     Lives in smoke free household   Substance Use Topics     Alcohol use: Yes     Comment: Occasionally, light use             6/25/2023     6:11 PM   Alcohol Use   Prescreen: >3 drinks/day or >7 drinks/week? No     Reviewed orders with patient.  Reviewed health maintenance and updated orders accordingly - Yes  Lab work is in process  Labs reviewed in EPIC  BP Readings from Last 3 Encounters:   06/26/23 126/70   01/19/22 116/84   02/17/21 110/62    Wt Readings from Last 3 Encounters:   06/26/23 116.6 kg (257 lb)   01/19/22 121.6 kg (268 lb)   02/17/21 115.3 kg (254 lb 3.2 oz)                  Patient Active Problem List   Diagnosis     Myopia     Obesity     Dysmenorrhea     Hypothyroidism due to acquired atrophy of thyroid     Supravalvar aortic  stenosis     TMJ (temporomandibular joint syndrome)     Myofascial muscle pain     Cervicalgia     THANG (generalized anxiety disorder)     Dysthymia     Morbid obesity (H)     Past Surgical History:   Procedure Laterality Date     EYE SURGERY       HEART CATH CHILD  08/20/2012    Procedure: HEART CATH CHILD;  Right and Left Heart Cath;  Surgeon: Esvin Merlos MD;  Location: [facility]     RELEASE TRIGGER FINGER       REPAIR VALVE AORTIC  10/08/2012    Procedure: REPAIR VALVE AORTIC;  Release Supravalvular Aortic Stenosis, medan sternotomy, trans-esophageal echocardiaogram done by Dr. Wills;  Surgeon: Seng Lan MD;  Location: [facility]       Social History     Tobacco Use     Smoking status: Never     Passive exposure: Never     Smokeless tobacco: Never     Tobacco comments:     Lives in smoke free household   Substance Use Topics     Alcohol use: Yes     Comment: Occasionally, light use     Family History   Problem Relation Age of Onset     Hypertension Father      Cardiovascular Maternal Grandmother      Heart Disease Maternal Grandmother      Cardiovascular Paternal Grandmother      Lipids Paternal Grandmother      Heart Disease Paternal Grandmother      Cardiovascular Brother      Heart Disease Brother      Cardiovascular Maternal Aunt      Heart Disease Maternal Aunt      Musculoskeletal Disorder Mother         back     Prostate Cancer Paternal Grandfather      Cancer No family hx of      Diabetes No family hx of      Cerebrovascular Disease No family hx of      Thyroid Disease No family hx of      Glaucoma No family hx of      Macular Degeneration No family hx of          Current Outpatient Medications   Medication Sig Dispense Refill     amoxicillin (AMOXIL) 500 MG tablet Take 2,000 mg by mouth once as needed for Prior to Procedure       aspirin 81 MG EC tablet Take 81 mg by mouth daily       Ferrous Sulfate (IRON SUPPLEMENT PO) Reported on 3/30/2017       FLUoxetine (PROZAC) 20 MG capsule TAKE 1  CAPSULE BY MOUTH ONCE DAILY DUE  FOR  A  RECHECK. 30 capsule 0     levothyroxine (SYNTHROID/LEVOTHROID) 25 MCG tablet Take 1 and 1/2 tablets by mouth once daily.  Due for a recheck. 45 tablet 0     Vitamin D3 (CHOLECALCIFEROL) 25 mcg (1000 units) tablet Take 2 tablets (50 mcg) by mouth daily 100 tablet 3     No Known Allergies  Recent Labs   Lab Test 03/30/22  1307 01/19/22  0808 10/19/20  1741   LDL  --  134*  --    HDL  --  48*  --    TRIG  --  111  --    TSH 2.46  --  3.85    Answers for HPI/ROS submitted by the patient on 6/25/2023  If you checked off any problems, how difficult have these problems made it for you to do your work, take care of things at home, or get along with other people?: Somewhat difficult  THANG 7 TOTAL SCORE: 12    Conflicting answers have been found for some questions. Please document the patient's answers manually.         Breast Cancer Screening:    FHS-7:        No data to display              click delete button to remove this line now  Patient under 40 years of age: Routine Mammogram Screening not recommended.   Pertinent mammograms are reviewed under the imaging tab.    History of abnormal Pap smear: NO - age 21-29 PAP every 3 years recommended      1/19/2022     7:31 AM   PAP / HPV   PAP Negative for Intraepithelial Lesion or Malignancy (NILM)      Reviewed and updated as needed this visit by clinical staff   Tobacco  Allergies  Meds              Reviewed and updated as needed this visit by Provider                 Past Medical History:   Diagnosis Date     Aortic valve disorder      Depressive disorder      H/O bacterial endocarditis      Myopia      Obesity       Past Surgical History:   Procedure Laterality Date     EYE SURGERY       HEART CATH CHILD  08/20/2012    Procedure: HEART CATH CHILD;  Right and Left Heart Cath;  Surgeon: Esvin Merlos MD;  [facility]     RELEASE TRIGGER FINGER       REPAIR VALVE AORTIC  10/08/2012    Procedure: REPAIR VALVE AORTIC;  Release  "Supravalvular Aortic Stenosis, medan sternotomy, trans-esophageal echocardiaogram done by Dr. Wills;  Surgeon: Seng Lan MD;  Location: [facility]       Review of Systems   Constitutional: Negative for chills and fever.   HENT: Negative for ear pain and hearing loss.    Eyes: Negative for pain and visual disturbance.   Respiratory: Negative for cough and shortness of breath.    Cardiovascular: Negative for chest pain, palpitations and peripheral edema.   Gastrointestinal: Negative for abdominal pain, constipation, diarrhea, heartburn, hematochezia and nausea.   Breasts:  Negative for tenderness, breast mass and discharge.   Genitourinary: Negative for dysuria, frequency, genital sores, hematuria, pelvic pain, urgency, vaginal bleeding and vaginal discharge.   Musculoskeletal: Negative for arthralgias, joint swelling and myalgias.   Skin: Negative for rash.   Neurological: Negative for dizziness, weakness, headaches and paresthesias.   Psychiatric/Behavioral: Negative for mood changes.     CONSTITUTIONAL: NEGATIVE for fever, chills, change in weight  INTEGUMENTARU/SKIN: NEGATIVE for worrisome rashes, moles or lesions  EYES: NEGATIVE for vision changes or irritation  ENT: NEGATIVE for ear, mouth and throat problems  RESP: NEGATIVE for significant cough or SOB  BREAST: NEGATIVE for masses, tenderness or discharge  CV: NEGATIVE for chest pain, palpitations or peripheral edema  GI: NEGATIVE for nausea, abdominal pain, heartburn, or change in bowel habits  : NEGATIVE for unusual urinary or vaginal symptoms. Periods are regular.  MUSCULOSKELETAL: NEGATIVE for significant arthralgias or myalgia  NEURO: NEGATIVE for weakness, dizziness or paresthesias  PSYCHIATRIC: NEGATIVE for changes in mood or affect     OBJECTIVE:   /70   Pulse 79   Temp 97.6  F (36.4  C) (Temporal)   Resp 20   Ht 1.646 m (5' 4.8\")   Wt 116.6 kg (257 lb)   LMP 06/12/2023   SpO2 94%   Breastfeeding No   BMI 43.03 kg/m  "   Physical Exam  GENERAL: healthy, alert and no distress  EYES: Eyes grossly normal to inspection, PERRL and conjunctivae and sclerae normal  HENT: ear canals and TM's normal, nose and mouth without ulcers or lesions  NECK: no adenopathy, no asymmetry, masses, or scars and thyroid normal to palpation  RESP: lungs clear to auscultation - no rales, rhonchi or wheezes  BREAST: normal without masses, tenderness or nipple discharge and no palpable axillary masses or adenopathy  CV: regular rate and rhythm, normal S1 S2, no S3 or S4, no murmur, click or rub, no peripheral edema and peripheral pulses strong  ABDOMEN: soft, nontender, no hepatosplenomegaly, no masses and bowel sounds normal  MS: no gross musculoskeletal defects noted, no edema  SKIN: no suspicious lesions or rashes  NEURO: Normal strength and tone, mentation intact and speech normal  PSYCH: mentation appears normal, affect normal/bright    Diagnostic Test Results:  Labs reviewed in Epic    ASSESSMENT/PLAN:       ICD-10-CM    1. Routine general medical examination at a health care facility  Z00.00       2. Hypothyroidism due to acquired atrophy of thyroid  E03.4 TSH     levothyroxine (SYNTHROID/LEVOTHROID) 25 MCG tablet      3. THANG (generalized anxiety disorder)  F41.1 FLUoxetine (PROZAC) 20 MG capsule      4. Moderate episode of recurrent major depressive disorder (H)  F33.1 FLUoxetine (PROZAC) 20 MG capsule          Patient has been advised of split billing requirements and indicates understanding: Yes      COUNSELING:  Reviewed preventive health counseling, as reflected in patient instructions       Regular exercise       Healthy diet/nutrition        She reports that she has never smoked. She has never been exposed to tobacco smoke. She has never used smokeless tobacco.          Vijay Hammonds PA-C  [facility]

## 2024-02-05 ENCOUNTER — OFFICE VISIT (OUTPATIENT)
Dept: FAMILY MEDICINE | Facility: CLINIC | Age: 29
End: 2024-02-05
Payer: COMMERCIAL

## 2024-02-05 VITALS
OXYGEN SATURATION: 97 % | SYSTOLIC BLOOD PRESSURE: 124 MMHG | DIASTOLIC BLOOD PRESSURE: 74 MMHG | BODY MASS INDEX: 42.52 KG/M2 | WEIGHT: 255.2 LBS | HEIGHT: 65 IN | TEMPERATURE: 97.2 F | RESPIRATION RATE: 20 BRPM | HEART RATE: 107 BPM

## 2024-02-05 DIAGNOSIS — F41.1 GAD (GENERALIZED ANXIETY DISORDER): Primary | ICD-10-CM

## 2024-02-05 DIAGNOSIS — F33.1 MODERATE EPISODE OF RECURRENT MAJOR DEPRESSIVE DISORDER (H): ICD-10-CM

## 2024-02-05 PROCEDURE — 99213 OFFICE O/P EST LOW 20 MIN: CPT | Performed by: PHYSICIAN ASSISTANT

## 2024-02-05 ASSESSMENT — ANXIETY QUESTIONNAIRES
3. WORRYING TOO MUCH ABOUT DIFFERENT THINGS: MORE THAN HALF THE DAYS
7. FEELING AFRAID AS IF SOMETHING AWFUL MIGHT HAPPEN: MORE THAN HALF THE DAYS
8. IF YOU CHECKED OFF ANY PROBLEMS, HOW DIFFICULT HAVE THESE MADE IT FOR YOU TO DO YOUR WORK, TAKE CARE OF THINGS AT HOME, OR GET ALONG WITH OTHER PEOPLE?: SOMEWHAT DIFFICULT
6. BECOMING EASILY ANNOYED OR IRRITABLE: MORE THAN HALF THE DAYS
GAD7 TOTAL SCORE: 12
2. NOT BEING ABLE TO STOP OR CONTROL WORRYING: MORE THAN HALF THE DAYS
1. FEELING NERVOUS, ANXIOUS, OR ON EDGE: MORE THAN HALF THE DAYS
IF YOU CHECKED OFF ANY PROBLEMS ON THIS QUESTIONNAIRE, HOW DIFFICULT HAVE THESE PROBLEMS MADE IT FOR YOU TO DO YOUR WORK, TAKE CARE OF THINGS AT HOME, OR GET ALONG WITH OTHER PEOPLE: SOMEWHAT DIFFICULT
7. FEELING AFRAID AS IF SOMETHING AWFUL MIGHT HAPPEN: MORE THAN HALF THE DAYS
GAD7 TOTAL SCORE: 12
5. BEING SO RESTLESS THAT IT IS HARD TO SIT STILL: SEVERAL DAYS
GAD7 TOTAL SCORE: 12
4. TROUBLE RELAXING: SEVERAL DAYS

## 2024-02-05 ASSESSMENT — PATIENT HEALTH QUESTIONNAIRE - PHQ9
SUM OF ALL RESPONSES TO PHQ QUESTIONS 1-9: 11
SUM OF ALL RESPONSES TO PHQ QUESTIONS 1-9: 11
10. IF YOU CHECKED OFF ANY PROBLEMS, HOW DIFFICULT HAVE THESE PROBLEMS MADE IT FOR YOU TO DO YOUR WORK, TAKE CARE OF THINGS AT HOME, OR GET ALONG WITH OTHER PEOPLE: SOMEWHAT DIFFICULT

## 2024-02-05 ASSESSMENT — PAIN SCALES - GENERAL: PAINLEVEL: NO PAIN (0)

## 2024-02-05 NOTE — PROGRESS NOTES
"    Pamella Morgan is a 28 year old, presenting for the following health issues:  Recheck Medication        2/5/2024    10:23 AM   Additional Questions   Roomed by Yessenia Tolentino CMA   Accompanied by None         2/5/2024    10:23 AM   Patient Reported Additional Medications   Patient reports taking the following new medications none     History of Present Illness       Mental Health Follow-up:  Patient presents to follow-up on Depression & Anxiety.Patient's depression since last visit has been:  Better  The patient is not having other symptoms associated with depression.  Patient's anxiety since last visit has been:  Better  The patient is not having other symptoms associated with anxiety.  Any significant life events: No and grief or loss  Patient is feeling anxious or having panic attacks.  Patient has no concerns about alcohol or drug use.    She eats 2-3 servings of fruits and vegetables daily.She consumes 1 sweetened beverage(s) daily.She exercises with enough effort to increase her heart rate 30 to 60 minutes per day.  She exercises with enough effort to increase her heart rate 5 days per week.   She is taking medications regularly.       Depression and miguelina are doing well. Denies panic attacks or anhedonia.  Taking and tolerating prozac. Not suicidal. Sleeping fine.      Review of Systems  Constitutional, neuro, ENT, endocrine, pulmonary, cardiac, gastrointestinal, genitourinary, musculoskeletal, integument and psychiatric systems are negative, except as otherwise noted.      Objective    /74   Pulse 107   Temp 97.2  F (36.2  C) (Temporal)   Resp 20   Ht 1.651 m (5' 5\")   Wt 115.8 kg (255 lb 3.2 oz)   LMP 01/27/2024 (Approximate)   SpO2 97%   BMI 42.47 kg/m    Body mass index is 42.47 kg/m .  Physical Exam   Eye exam - right eye normal lid, conjunctiva, cornea, pupil and fundus, left eye normal lid, conjunctiva, cornea, pupil and fundus.  Thyroid not palpable, not enlarged, no nodules " detected.  CHEST:chest clear to IPPA, no tachypnea, retractions or cyanosis, and S1, S2 normal, no murmur, no gallop, rate regular.  Cathy was seen today for recheck medication.    Diagnoses and all orders for this visit:    THANG (generalized anxiety disorder)  -     FLUoxetine (PROZAC) 20 MG capsule; Take 1 capsule by mouth once daily    Moderate episode of recurrent major depressive disorder (H)  -     FLUoxetine (PROZAC) 20 MG capsule; Take 1 capsule by mouth once daily      work on lifestyle modification    Signed Electronically by: Vijay Hammonds PA-C

## 2024-05-22 ENCOUNTER — OFFICE VISIT (OUTPATIENT)
Dept: FAMILY MEDICINE | Facility: CLINIC | Age: 29
End: 2024-05-22
Payer: COMMERCIAL

## 2024-05-22 VITALS
DIASTOLIC BLOOD PRESSURE: 62 MMHG | TEMPERATURE: 97.9 F | HEIGHT: 65 IN | RESPIRATION RATE: 16 BRPM | BODY MASS INDEX: 39.99 KG/M2 | WEIGHT: 240 LBS | SYSTOLIC BLOOD PRESSURE: 118 MMHG | OXYGEN SATURATION: 99 % | HEART RATE: 70 BPM

## 2024-05-22 DIAGNOSIS — Z97.5 IUD (INTRAUTERINE DEVICE) IN PLACE: ICD-10-CM

## 2024-05-22 DIAGNOSIS — N76.0 BACTERIAL VAGINOSIS: ICD-10-CM

## 2024-05-22 DIAGNOSIS — B37.31 CANDIDIASIS OF VAGINA: ICD-10-CM

## 2024-05-22 DIAGNOSIS — N94.6 DYSMENORRHEA: Primary | ICD-10-CM

## 2024-05-22 DIAGNOSIS — Q25.3 SUPRAVALVAR AORTIC STENOSIS: ICD-10-CM

## 2024-05-22 DIAGNOSIS — B96.89 BACTERIAL VAGINOSIS: ICD-10-CM

## 2024-05-22 DIAGNOSIS — Z30.09 GENERAL COUNSELING AND ADVICE FOR CONTRACEPTIVE MANAGEMENT: ICD-10-CM

## 2024-05-22 LAB
CLUE CELLS: PRESENT
TRICHOMONAS, WET PREP: ABNORMAL
WBC'S/HIGH POWER FIELD, WET PREP: ABNORMAL
YEAST, WET PREP: PRESENT

## 2024-05-22 PROCEDURE — 87491 CHLMYD TRACH DNA AMP PROBE: CPT | Performed by: PHYSICIAN ASSISTANT

## 2024-05-22 PROCEDURE — 87591 N.GONORRHOEAE DNA AMP PROB: CPT | Performed by: PHYSICIAN ASSISTANT

## 2024-05-22 PROCEDURE — 87210 SMEAR WET MOUNT SALINE/INK: CPT | Performed by: PHYSICIAN ASSISTANT

## 2024-05-22 PROCEDURE — 99214 OFFICE O/P EST MOD 30 MIN: CPT | Performed by: PHYSICIAN ASSISTANT

## 2024-05-22 ASSESSMENT — PATIENT HEALTH QUESTIONNAIRE - PHQ9
10. IF YOU CHECKED OFF ANY PROBLEMS, HOW DIFFICULT HAVE THESE PROBLEMS MADE IT FOR YOU TO DO YOUR WORK, TAKE CARE OF THINGS AT HOME, OR GET ALONG WITH OTHER PEOPLE: SOMEWHAT DIFFICULT
SUM OF ALL RESPONSES TO PHQ QUESTIONS 1-9: 12
SUM OF ALL RESPONSES TO PHQ QUESTIONS 1-9: 12

## 2024-05-22 NOTE — PATIENT INSTRUCTIONS
Contraceptive options:     Nexplanon:  Implantable progesterone device.  Good for 3 years.  More irregular bleeding with this.     IUD (Mirena):  Progesterone IUD.  Good for 8 years.  Often less bleeding, very light or it may stop all together.  Insertion can be uncomfortable.  We should do a virtual visit to discuss the procedure in more detail if you would like to do this one.      Depo shot.  Progesterone shot, once every 3 months.  Period usually stops.  More weight gain.  May cause bone loss if used more than 2 years.     Phexxi: non-hormonal vaginal suppository medication you use prior to intercourse.     Regardless of what birth control method you choose, it is still important to use condoms to protect against sexually transmitted infections.      There are a few options if you ever need immediate psychiatric help.      [facility] has a new EmPATH unit that is designed for management of mental health needs when people are in crisis.  Go to [facility] if you are experiencing a mental health crisis.      The shared, open layout is equipped with comfortable recliners, self-serve refreshment stations, and sensory rooms to put patients at ease while our care teams address their mental health needs. [facility] s new [facility]PATH combines a calming environment with expert care so that people in crisis can get help quickly.      Another option is the [facility]    [phone]    If you cannot get to the above location, there is a crisis line that you can call and they may be able to come to you:  COPE    [phone]    Other crisis lines:      Suicide Prevention Lifeline: 0-126-390-TALK (5913)    Crisis Text Line Service (available 24 hours a day, 7 days a week): Text [place] to 356328    Please reach out if you are ever in need of help with your mental health.

## 2024-05-22 NOTE — PROGRESS NOTES
Assessment & Plan     Dysmenorrhea  Will check labs for possible cause of heavy bleeding/cramping, however most likely this is due to her paraguard IUD regardless.  Given her suprvalvular aortic stenosis, she cannot take estrogen, therefore non-estrogen containing birth control options discussed.      General counseling and advice for contraceptive management:      Nexplanon:  Implantable progesterone device.  Good for 3 years.  More irregular bleeding with this.     IUD (Mirena):  Progesterone IUD.  Good for 8 years.  Often less bleeding, very light or it may stop all together.  Insertion can be uncomfortable.  We should do a virtual visit to discuss the procedure in more detail if you would like to do this one.      Depo shot.  Progesterone shot, once every 3 months.  Period usually stops.  More weight gain.  May cause bone loss if used more than 2 years.     Phexxi: non-hormonal vaginal suppository medication you use prior to intercourse.     Condoms:     Regardless of what birth control method you choose, it is still important to use condoms to protect against sexually transmitted infections.        She is leaning towards switching to a Mirena IUD.     - Wet prep - Clinic Collect  - NEISSERIA GONORRHOEA PCR  - CHLAMYDIA TRACHOMATIS PCR    IUD (intrauterine device) in place  Currently has paraguard, however having heavy cramping/bleeding since it was placed about 2 years ago.  May consider switching to mirena.     Supravalvar aortic stenosis  Avoid estrogen.     Candidiasis of vagina  Will treat with 1 dose of diflucan  - fluconazole (DIFLUCAN) 150 MG tablet; Take 1 tablet (150 mg) by mouth once for 1 dose    Bacterial vaginosis  Bacterial Vaginosis     Medication started today, see epic for orders.  Patient is to avoid alcohol while on Flagyl.  I briefly discussed the pathophysiology of bacterial vaginosis and outlined the expected course.  I discussed the warning symptoms and signs that indicate an atypical  "course that would need urgent follow up.    - metroNIDAZOLE (FLAGYL) 500 MG tablet; Take 1 tablet (500 mg) by mouth 2 times daily for 7 days          BMI  Estimated body mass index is 39.94 kg/m  as calculated from the following:    Height as of this encounter: 1.651 m (5' 5\").    Weight as of this encounter: 108.9 kg (240 lb).       Depression Screening Follow Up                  Follow Up Actions Taken  Crisis resource information provided in the After Visit Summary            Pamella Morgan is a 28 year old, presenting for the following health issues:  Menstrual Problem        5/22/2024     4:44 PM   Additional Questions   Roomed by Jena   Accompanied by Self         5/22/2024     4:44 PM   Patient Reported Additional Medications   Patient reports taking the following new medications vitamin B12       Patient arrived to discuss ongoing intermittent vaginal bleeding before and after menstrual cycles x 1.5 years. Patient had Paragaurd IUD placed February of 2022.    Patient undressed for pelvic exam, Ringed Forceps and IUD Hook placed outside room incase needed.     History of Present Illness       Reason for visit:  Incredibly heavy, irregular periods with spotting 1 week before and 1 week afterwards  Symptom onset:  More than a month  Symptoms include:  Cramping, menorrhagia, prolonged menstruation  Symptom intensity:  Moderate  Symptom progression:  Staying the same  Had these symptoms before:  Yes  Has tried/received treatment for these symptoms:  No    She eats 2-3 servings of fruits and vegetables daily.She consumes 1 sweetened beverage(s) daily.She exercises with enough effort to increase her heart rate 30 to 60 minutes per day.  She exercises with enough effort to increase her heart rate 5 days per week.   She is taking medications regularly.     Wanted a non-hormonal birth control and [facility]    She does c/o cramping, mainly around period.     Has " "suprvalvular aortic stenosis and told she cannot take estrogen.    She was on the progesterone mini pill in the past.                      Review of Systems  Constitutional, HEENT, cardiovascular, pulmonary, gi and gu systems are negative, except as otherwise noted.      Objective    /62 (BP Location: Right arm, Patient Position: Chair, Cuff Size: Adult Large)   Pulse 70   Temp 97.9  F (36.6  C) (Tympanic)   Resp 16   Ht 1.651 m (5' 5\")   Wt 108.9 kg (240 lb)   LMP 05/14/2024 (Approximate)   SpO2 99%   BMI 39.94 kg/m    Body mass index is 39.94 kg/m .  Physical Exam   GENERAL: alert and no distress   (female): normal female external genitalia, normal urethral meatus, white creamy vaginal discharge noted with fishy odor.  IUD string noted at cervical os.     Results for orders placed or performed in visit on 05/22/24   Wet prep - Clinic Collect     Status: Abnormal    Specimen: Vagina; Swab   Result Value Ref Range    Trichomonas Absent Absent    Yeast Present (A) Absent    Clue Cells Present (A) Absent    WBCs/high power field 1+ (A) None           Signed Electronically by: Nereida Doe PA-C    "

## 2024-05-23 LAB
C TRACH DNA SPEC QL NAA+PROBE: NEGATIVE
N GONORRHOEA DNA SPEC QL NAA+PROBE: NEGATIVE

## 2024-05-23 RX ORDER — FLUCONAZOLE 150 MG/1
150 TABLET ORAL ONCE
Qty: 1 TABLET | Refills: 0 | Status: SHIPPED | OUTPATIENT
Start: 2024-05-23 | End: 2024-05-23

## 2024-05-23 RX ORDER — METRONIDAZOLE 500 MG/1
500 TABLET ORAL 2 TIMES DAILY
Qty: 14 TABLET | Refills: 0 | Status: SHIPPED | OUTPATIENT
Start: 2024-05-23 | End: 2024-05-30

## 2024-08-18 ASSESSMENT — PATIENT HEALTH QUESTIONNAIRE - PHQ9
10. IF YOU CHECKED OFF ANY PROBLEMS, HOW DIFFICULT HAVE THESE PROBLEMS MADE IT FOR YOU TO DO YOUR WORK, TAKE CARE OF THINGS AT HOME, OR GET ALONG WITH OTHER PEOPLE: SOMEWHAT DIFFICULT
SUM OF ALL RESPONSES TO PHQ QUESTIONS 1-9: 10
SUM OF ALL RESPONSES TO PHQ QUESTIONS 1-9: 10

## 2024-08-19 ENCOUNTER — OFFICE VISIT (OUTPATIENT)
Dept: FAMILY MEDICINE | Facility: CLINIC | Age: 29
End: 2024-08-19
Payer: COMMERCIAL

## 2024-08-19 VITALS
RESPIRATION RATE: 16 BRPM | TEMPERATURE: 97.1 F | SYSTOLIC BLOOD PRESSURE: 130 MMHG | WEIGHT: 234 LBS | OXYGEN SATURATION: 99 % | BODY MASS INDEX: 38.94 KG/M2 | HEART RATE: 89 BPM | DIASTOLIC BLOOD PRESSURE: 78 MMHG

## 2024-08-19 DIAGNOSIS — Z30.433 ENCOUNTER FOR IUD REMOVAL AND REINSERTION: Primary | ICD-10-CM

## 2024-08-19 DIAGNOSIS — Z12.4 CERVICAL CANCER SCREENING: ICD-10-CM

## 2024-08-19 PROCEDURE — 58300 INSERT INTRAUTERINE DEVICE: CPT | Performed by: PHYSICIAN ASSISTANT

## 2024-08-19 PROCEDURE — 58301 REMOVE INTRAUTERINE DEVICE: CPT | Performed by: PHYSICIAN ASSISTANT

## 2024-08-19 PROCEDURE — G0145 SCR C/V CYTO,THINLAYER,RESCR: HCPCS | Performed by: PHYSICIAN ASSISTANT

## 2024-08-19 NOTE — PROGRESS NOTES
Assessment & Plan     Encounter for IUD removal and reinsertion  Cathy Galloway is a 29 year old female who presents today requesting replacement of an intrauterine device.  She currently has the Paraguard IUD and is not tolerating it well (has heavy bleeding and cramping).  Would like to switch to Mirena.   The patient meets and is agreeable to the following conditions:   She is parous.   She is not interested in conception in the near future.   She currently is in a stable, monogamous relationship.   There is no previous history of pelvic inflammatory disease.   There is no previous history of ectopic pregnancy.   She is willing to check monthly for the IUD string.   She is at least 8 weeks post-partum.   There is no history of unresolved abnormal uterine bleeding.   There is no history of an unresolved abnormal PAP smear.   She has no history of Isidro's disease or an allergy to copper (for ParaGard).   She has no history of diabetes, AIDS, leukemia, IV drug use or chronic steroid use.   She is willing to return annually for pelvic exams.   She is current on pap smear testing.   She denies the possibility of pregnancy.   The following risks were discussed with the patient:   Possibility of pregnancy and ectopic pregnancy.   Possibility of pelvic inflammatory disease, particularly with new partners.   Risk of uterine perforation or IUD expulsion.   Possibility of difficult removal.   Spotting or heavy bleeding.   Cramping, pain or infection during or after insertion.   The patient was given patient information on the IUD and the patient education brochure from the . The patient has given consent to proceed with placement of the IUD. A written consent form is present in the chart. She wishes to proceed.   PROCEDURE:   Type of IUD: Mirena  She is placed in a dorsal lithotomy potion and a pelvic exam is performed to determine the position of the uterus. The cervix is identified and IUD string  "visualized at cervical os and grasped with a ring forceps.  IUD removed easily without complication.  Cervix then cleaned with betadine three times. A single tooth tenaculum is applied to the anterior lip of the cervix for stabilization. The uterus is sounded to 8 cm and removed. (Target sound depth is 6.5 cm to 8.5 cm.) The IUD insertion tube is prepared to manufacturers recommendations and inserted into the uterus under sterile conditions to the sounding depth. The arms of the IUD are then opened by withdrawing the insertion tube 2.0 cm while stabilizing the solid insertion xin without difficulty. The IUD string is then cut to 5.0 cm.   The patient tolerated this procedure without immediate complication. The patient is to return or call immediately for any unexplained fever, abdominal or pelvic pain, excessive bleeding, possibility of pregnancy, foul-smelling discharge, sense that the IUD has been expelled.  Follow-up in 1 month for a recheck.  Nereida Doe PA-C            - levonorgestrel (MIRENA) 52 MG (20 mcg/day) IUD 1 each  - INSERTION INTRAUTERINE DEVICE  - REMOVE INTRAUTERINE DEVICE    Cervical cancer screening  Pap smear completed today.   - Pap Screen Reflex to HPV if ASCUS - Recommended Age 25 - 29 Years          BMI  Estimated body mass index is 38.94 kg/m  as calculated from the following:    Height as of 5/22/24: 1.651 m (5' 5\").    Weight as of this encounter: 106.1 kg (234 lb).       Depression Screening Follow Up        8/18/2024     9:40 AM   PHQ   PHQ-9 Total Score 10   Q9: Thoughts of better off dead/self-harm past 2 weeks Several days   F/U: Thoughts of suicide or self-harm No   F/U: Safety concerns No                     Follow Up Actions Taken  Crisis resource information provided in the After Visit Summary        Pamella Morgan is a 29 year old, presenting for the following health issues:  IUD        8/19/2024     7:53 AM   Additional Questions   Roomed by Jena   Accompanied by Self "         8/19/2024     7:53 AM   Patient Reported Additional Medications   Patient reports taking the following new medications NA     Patient arrived for Paragaurd IUD Removal and Mirena IUD Insert due to abnormal Vaginal Bleeding/Spotting.     Patient due for upcoming PAP.      History of Present Illness       Reason for visit:  Copper iud removal and progestin iud placement    She eats 2-3 servings of fruits and vegetables daily.She consumes 1 sweetened beverage(s) daily.She exercises with enough effort to increase her heart rate 60 or more minutes per day.  She exercises with enough effort to increase her heart rate 5 days per week.   She is taking medications regularly.       Had copper IUD placed about 2 years ago, however has had heavy bleeding and cramping since.  She would like it replaced with Mirena.           Review of Systems  Constitutional, neuro, ENT, endocrine, pulmonary, cardiac, gastrointestinal, genitourinary, musculoskeletal, integument and psychiatric systems are negative, except as otherwise noted.      Objective    /78 (BP Location: Left arm, Patient Position: Chair, Cuff Size: Adult Large)   Pulse 89   Temp 97.1  F (36.2  C) (Tympanic)   Resp 16   Wt 106.1 kg (234 lb)   SpO2 99%   BMI 38.94 kg/m    Body mass index is 38.94 kg/m .  Physical Exam   GENERAL: alert and no distress            Signed Electronically by: Nereida Doe PA-C

## 2024-08-19 NOTE — PATIENT INSTRUCTIONS
Everything went great with the insertion today.  Your cramping and pain should continue to fade from here.  Ibuprofen is generally best for the cramping pain as well as heating pads.     If you develop worsened or more severe pelvic pain, heavy vaginal bleeding or fever please seek emergent care.     I would recommend you check for your strings over the next few days to confirm you can feel them in the vagina.  Please let us know if you cannot identify them.     We should visit again in 1 month for a recheck and to make sure things are going well    There are a few options if you ever need immediate psychiatric help.      [facility] has a new EmPATH unit that is designed for management of mental health needs when people are in crisis.  Go to [facility] if you are experiencing a mental health crisis.      The shared, open layout is equipped with comfortable recliners, self-serve refreshment stations, and sensory rooms to put patients at ease while our care teams address their mental health needs. [facility] s new EmPATH combines a calming environment with expert care so that people in crisis can get help quickly.      Another option is the [facility]    [phone]    If you cannot get to the above location, there is a crisis line that you can call and they may be able to come to you:  COPE    [phone]    Other crisis lines:      Suicide Prevention Lifeline: 7-640-042-TALK (9759)    Crisis Text Line Service (available 24 hours a day, 7 days a week): Text [place] to 336243    Please reach out if you are ever in need of help with your mental health.

## 2024-08-23 LAB
BKR LAB AP GYN ADEQUACY: NORMAL
BKR LAB AP GYN INTERPRETATION: NORMAL
BKR LAB AP HPV REFLEX: NORMAL
BKR LAB AP PREVIOUS ABNORMAL: NORMAL
PATH REPORT.COMMENTS IMP SPEC: NORMAL
PATH REPORT.COMMENTS IMP SPEC: NORMAL
PATH REPORT.RELEVANT HX SPEC: NORMAL

## 2024-08-24 ENCOUNTER — HEALTH MAINTENANCE LETTER (OUTPATIENT)
Age: 29
End: 2024-08-24

## 2024-09-16 ENCOUNTER — OFFICE VISIT (OUTPATIENT)
Dept: FAMILY MEDICINE | Facility: CLINIC | Age: 29
End: 2024-09-16
Payer: COMMERCIAL

## 2024-09-16 VITALS
RESPIRATION RATE: 16 BRPM | SYSTOLIC BLOOD PRESSURE: 116 MMHG | HEART RATE: 77 BPM | DIASTOLIC BLOOD PRESSURE: 68 MMHG | TEMPERATURE: 98.3 F | OXYGEN SATURATION: 100 % | BODY MASS INDEX: 39.27 KG/M2 | WEIGHT: 236 LBS

## 2024-09-16 DIAGNOSIS — F41.1 GAD (GENERALIZED ANXIETY DISORDER): ICD-10-CM

## 2024-09-16 DIAGNOSIS — Z00.00 ROUTINE GENERAL MEDICAL EXAMINATION AT A HEALTH CARE FACILITY: Primary | ICD-10-CM

## 2024-09-16 DIAGNOSIS — Z86.39 HISTORY OF IRON DEFICIENCY: ICD-10-CM

## 2024-09-16 DIAGNOSIS — E03.4 HYPOTHYROIDISM DUE TO ACQUIRED ATROPHY OF THYROID: ICD-10-CM

## 2024-09-16 DIAGNOSIS — F33.1 MODERATE EPISODE OF RECURRENT MAJOR DEPRESSIVE DISORDER (H): ICD-10-CM

## 2024-09-16 DIAGNOSIS — Z97.5 IUD (INTRAUTERINE DEVICE) IN PLACE: ICD-10-CM

## 2024-09-16 LAB
ERYTHROCYTE [DISTWIDTH] IN BLOOD BY AUTOMATED COUNT: 12.6 % (ref 10–15)
HCT VFR BLD AUTO: 38.9 % (ref 35–47)
HGB BLD-MCNC: 13 G/DL (ref 11.7–15.7)
MCH RBC QN AUTO: 30.6 PG (ref 26.5–33)
MCHC RBC AUTO-ENTMCNC: 33.4 G/DL (ref 31.5–36.5)
MCV RBC AUTO: 92 FL (ref 78–100)
PLATELET # BLD AUTO: 277 10E3/UL (ref 150–450)
RBC # BLD AUTO: 4.25 10E6/UL (ref 3.8–5.2)
WBC # BLD AUTO: 7.6 10E3/UL (ref 4–11)

## 2024-09-16 PROCEDURE — 83540 ASSAY OF IRON: CPT | Performed by: PHYSICIAN ASSISTANT

## 2024-09-16 PROCEDURE — 99213 OFFICE O/P EST LOW 20 MIN: CPT | Mod: 25 | Performed by: PHYSICIAN ASSISTANT

## 2024-09-16 PROCEDURE — 85027 COMPLETE CBC AUTOMATED: CPT | Performed by: PHYSICIAN ASSISTANT

## 2024-09-16 PROCEDURE — 83550 IRON BINDING TEST: CPT | Performed by: PHYSICIAN ASSISTANT

## 2024-09-16 PROCEDURE — 99395 PREV VISIT EST AGE 18-39: CPT | Performed by: PHYSICIAN ASSISTANT

## 2024-09-16 PROCEDURE — 84443 ASSAY THYROID STIM HORMONE: CPT | Performed by: PHYSICIAN ASSISTANT

## 2024-09-16 PROCEDURE — 36415 COLL VENOUS BLD VENIPUNCTURE: CPT | Performed by: PHYSICIAN ASSISTANT

## 2024-09-16 ASSESSMENT — ANXIETY QUESTIONNAIRES
GAD7 TOTAL SCORE: 10
5. BEING SO RESTLESS THAT IT IS HARD TO SIT STILL: SEVERAL DAYS
GAD7 TOTAL SCORE: 10
1. FEELING NERVOUS, ANXIOUS, OR ON EDGE: SEVERAL DAYS
2. NOT BEING ABLE TO STOP OR CONTROL WORRYING: MORE THAN HALF THE DAYS
IF YOU CHECKED OFF ANY PROBLEMS ON THIS QUESTIONNAIRE, HOW DIFFICULT HAVE THESE PROBLEMS MADE IT FOR YOU TO DO YOUR WORK, TAKE CARE OF THINGS AT HOME, OR GET ALONG WITH OTHER PEOPLE: SOMEWHAT DIFFICULT
6. BECOMING EASILY ANNOYED OR IRRITABLE: MORE THAN HALF THE DAYS
3. WORRYING TOO MUCH ABOUT DIFFERENT THINGS: MORE THAN HALF THE DAYS
7. FEELING AFRAID AS IF SOMETHING AWFUL MIGHT HAPPEN: SEVERAL DAYS

## 2024-09-16 ASSESSMENT — PATIENT HEALTH QUESTIONNAIRE - PHQ9: 5. POOR APPETITE OR OVEREATING: SEVERAL DAYS

## 2024-09-16 NOTE — PATIENT INSTRUCTIONS
I suggest a seasonal flu shot as well as a covid booster.  You can consider the newer protein based covid shot (novavax), available at the pharmacies.     Patient Education   Preventive Care Advice   This is general advice given by our system to help you stay healthy. However, your care team may have specific advice just for you. Please talk to your care team about your preventive care needs.  Nutrition  Eat 5 or more servings of fruits and vegetables each day.  Try wheat bread, brown rice and whole grain pasta (instead of white bread, rice, and pasta).  Get enough calcium and vitamin D. Check the label on foods and aim for 100% of the RDA (recommended daily allowance).  Lifestyle  Exercise at least 150 minutes each week  (30 minutes a day, 5 days a week).  Do muscle strengthening activities 2 days a week. These help control your weight and prevent disease.  No smoking.  Wear sunscreen to prevent skin cancer.  Have a dental exam and cleaning every 6 months.  Yearly exams  See your health care team every year to talk about:  Any changes in your health.  Any medicines your care team has prescribed.  Preventive care, family planning, and ways to prevent chronic diseases.  Shots (vaccines)   HPV shots (up to age 26), if you've never had them before.  Hepatitis B shots (up to age 59), if you've never had them before.  COVID-19 shot: Get this shot when it's due.  Flu shot: Get a flu shot every year.  Tetanus shot: Get a tetanus shot every 10 years.  Pneumococcal, hepatitis A, and RSV shots: Ask your care team if you need these based on your risk.  Shingles shot (for age 50 and up)  General health tests  Diabetes screening:  Starting at age 35, Get screened for diabetes at least every 3 years.  If you are younger than age 35, ask your care team if you should be screened for diabetes.  Cholesterol test: At age 39, start having a cholesterol test every 5 years, or more often if advised.  Bone density scan (DEXA): At age 50,  ask your care team if you should have this scan for osteoporosis (brittle bones).  Hepatitis C: Get tested at least once in your life.  STIs (sexually transmitted infections)  Before age 24: Ask your care team if you should be screened for STIs.  After age 24: Get screened for STIs if you're at risk. You are at risk for STIs (including HIV) if:  You are sexually active with more than one person.  You don't use condoms every time.  You or a partner was diagnosed with a sexually transmitted infection.  If you are at risk for HIV, ask about PrEP medicine to prevent HIV.  Get tested for HIV at least once in your life, whether you are at risk for HIV or not.  Cancer screening tests  Cervical cancer screening: If you have a cervix, begin getting regular cervical cancer screening tests starting at age 21.  Breast cancer scan (mammogram): If you've ever had breasts, begin having regular mammograms starting at age 40. This is a scan to check for breast cancer.  Colon cancer screening: It is important to start screening for colon cancer at age 45.  Have a colonoscopy test every 10 years (or more often if you're at risk) Or, ask your provider about stool tests like a FIT test every year or Cologuard test every 3 years.  To learn more about your testing options, visit:   .  For help making a decision, visit:   https://bit.ly/dd55713.  Prostate cancer screening test: If you have a prostate, ask your care team if a prostate cancer screening test (PSA) at age 55 is right for you.  Lung cancer screening: If you are a current or former smoker ages 50 to 80, ask your care team if ongoing lung cancer screenings are right for you.  For informational purposes only. Not to replace the advice of your health care provider. Copyright   2023 [employer]. All rights reserved. Clinically reviewed by the [facility] Transitions Program. [employer] [id number] - REV 01/24.  Substance Use Disorder: Care Instructions  Overview      You can improve your life and health by stopping your use of alcohol or drugs. When you don't drink or use drugs, you may feel and sleep better. You may get along better with your family, friends, and coworkers. There are medicines and programs that can help with substance use disorder.  How can you care for yourself at home?  Here are some ways to help you stay sober and prevent relapse.  If you have been given medicine to help keep you sober or reduce your cravings, be sure to take it exactly as prescribed.  Talk to your doctor about programs that can help you stop using drugs or drinking alcohol.  Do not keep alcohol or drugs in your home.  Plan ahead. Think about what you'll say if other people ask you to drink or use drugs. Try not to spend time with people who drink or use drugs.  Use the time and money spent on drinking or drugs to do something that's important to you.  Preventing a relapse  Have a plan to deal with relapse. Learn to recognize changes in your thinking that lead you to drink or use drugs. Get help before you start to drink or use drugs again.  Try to stay away from situations, friends, or places that may lead you to drink or use drugs.  If you feel the need to drink alcohol or use drugs again, seek help right away. Call a trusted friend or family member. Some people get support from organizations such as Narcotics Anonymous or [employer] or from treatment facilities.  If you relapse, get help as soon as you can. Some people make a plan with another person that outlines what they want that person to do for them if they relapse. The plan usually includes how to handle the relapse and who to notify in case of relapse.  Don't give up. Remember that a relapse doesn't mean that you have failed. Use the experience to learn the triggers that lead you to drink or use drugs. Then quit again. Recovery is a lifelong process. Many people have several relapses before they are able to quit for  "good.  Follow-up care is a key part of your treatment and safety. Be sure to make and go to all appointments, and call your doctor if you are having problems. It's also a good idea to know your test results and keep a list of the medicines you take.  When should you call for help?   Call 911  anytime you think you may need emergency care. For example, call if you or someone else:    Has overdosed or has withdrawal signs. Be sure to tell the emergency workers that you are or someone else is using or trying to quit using drugs. Overdose or withdrawal signs may include:  Losing consciousness.  Seizure.  Seeing or hearing things that aren't there (hallucinations).     Is thinking or talking about suicide or harming others.   Where to get help 24 hours a day, 7 days a week   If you or someone you know talks about suicide, self-harm, a mental health crisis, a substance use crisis, or any other kind of emotional distress, get help right away. You can:    Call the Suicide and Crisis Lifeline at 988.     Call 1-704-040-IBCN (1-954.797.4338).     Text HOME to 466710 to access the Crisis Text Line.   Consider saving these numbers in your phone.  Go to [employer] for more information or to chat online.  Call your doctor now or seek immediate medical care if:    You are having withdrawal symptoms. These may include nausea or vomiting, sweating, shakiness, and anxiety.   Watch closely for changes in your health, and be sure to contact your doctor if:    You have a relapse.     You need more help or support to stop.   Where can you learn more?  Go to https://www.healthvChatter.net/patiented  Enter H573 in the search box to learn more about \"Substance Use Disorder: Care Instructions.\"  Current as of: November 15, 2023               Content Version: 14.0    9660-4829 [employer].   Care instructions adapted under license by your healthcare professional. If you have questions about a medical condition or this " instruction, always ask your healthcare professional. [employer] disclaims any warranty or liability for your use of this information.

## 2024-09-16 NOTE — PROGRESS NOTES
"Preventive Care Visit  [facility] DEL Doe PA-C, Family Medicine  Sep 16, 2024      Assessment & Plan     Routine general medical examination at a health care facility      HEALTH CARE MAINTENANCE              Reviewed USPTF recommendations and anticipatory guidance.              See orders.   I discussed in detail with Cathy Galloway the importance of cervical cancer screenings through pap smears, will repeat pap every 3 year(s).        Hypothyroidism due to acquired atrophy of thyroid  Due to recheck level, and will refill medication accordingly.   - TSH    THANG (generalized anxiety disorder)  Stable, will leave as is.   - FLUoxetine (PROZAC) 20 MG capsule; Take 1 capsule by mouth once daily    Moderate episode of recurrent major depressive disorder (H)  Stable, will leave as is.   - FLUoxetine (PROZAC) 20 MG capsule; Take 1 capsule by mouth once daily    History of iron deficiency  Currently on an iron supplement, however unsure if she really still needs it.  Will check labs and go from there.  No longer donating blood due to aspirin use.     - CBC with platelets  - Iron and iron binding capacity    IUD in place:  doing well.  Felt string after insertion.     Patient has been advised of split billing requirements and indicates understanding: Yes        BMI  Estimated body mass index is 39.27 kg/m  as calculated from the following:    Height as of 5/22/24: 1.651 m (5' 5\").    Weight as of this encounter: 107 kg (236 lb).   Weight management plan: Discussed healthy diet and exercise guidelines    Counseling  Appropriate preventive services were addressed with this patient via screening, questionnaire, or discussion as appropriate for fall prevention, nutrition, physical activity, Tobacco-use cessation, social engagement, weight loss and cognition.  Checklist reviewing preventive services available has been given to the patient.  Reviewed patient's diet, addressing concerns " and/or questions.           Pamella Morgan is a 29 year old, presenting for the following:  Physical        9/16/2024     1:59 PM   Additional Questions   Roomed by Jena   Accompanied by Self         9/16/2024     1:59 PM   Patient Reported Additional Medications   Patient reports taking the following new medications NA        Health Care Directive  Patient does not have a Health Care Directive or Living Will: Discussed advance care planning with patient; however, patient declined at this time.    HPI      Patient with history of Anxiety and hypothyroidism arrived for Annual Physical, not due for PAP, undressing for IUD check. GAD7 given in room.     Patient is not fasting for lab work.    Patient reports post IUD insert for the first 3 weeks there was spotting and in the last week spotting has stopped. Cramping lasted first day of insert and has resolved since.     Taking an iron supplement once per day. Was running low in the past (when she was donating blood).  No longer able to donate due to aspirin.      Hypothyroidism Follow-up    Since last visit, patient describes the following symptoms: Weight stable, no hair loss, no skin changes, no constipation, no loose stools    Anxiety   How are you doing with your anxiety since your last visit? Stable   Are you having other symptoms that might be associated with anxiety? No  Have you had a significant life event? No   Are you feeling depressed? No  Do you have any concerns with your use of alcohol or other drugs? No    Social History     Tobacco Use    Smoking status: Never     Passive exposure: Never    Smokeless tobacco: Never    Tobacco comments:     Lives in smoke free household   Vaping Use    Vaping status: Never Used   Substance Use Topics    Alcohol use: Yes     Comment: Occasionally, light use    Drug use: No         6/26/2023     2:04 PM 2/5/2024    10:10 AM 9/16/2024     2:41 PM   THANG-7 SCORE   Total Score 12 (moderate anxiety) 12 (moderate anxiety)     Total Score 12 12 10         2/5/2024    10:08 AM 5/22/2024     4:40 PM 8/18/2024     9:40 AM   PHQ   PHQ-9 Total Score 11 12 10   Q9: Thoughts of better off dead/self-harm past 2 weeks Several days Several days Several days   F/U: Thoughts of suicide or self-harm No No No   F/U: Safety concerns No No No         8/18/2024     9:40 AM   Last PHQ-9   1.  Little interest or pleasure in doing things 1   2.  Feeling down, depressed, or hopeless 1   3.  Trouble falling or staying asleep, or sleeping too much 1   4.  Feeling tired or having little energy 1   5.  Poor appetite or overeating 2   6.  Feeling bad about yourself 1   7.  Trouble concentrating 1   8.  Moving slowly or restless 1   Q9: Thoughts of better off dead/self-harm past 2 weeks 1   PHQ-9 Total Score 10   In the past two weeks have you had thoughts of suicide or self harm? No   Do you have concerns about your personal safety or the safety of others? No         9/16/2024     2:41 PM   THANG-7    1. Feeling nervous, anxious, or on edge 1   2. Not being able to stop or control worrying 2   3. Worrying too much about different things 2   4. Trouble relaxing 1   5. Being so restless that it is hard to sit still 1   6. Becoming easily annoyed or irritable 2   7. Feeling afraid, as if something awful might happen 1   THANG-7 Total Score 10   If you checked any problems, how difficult have they made it for you to do your work, take care of things at home, or get along with other people? Somewhat difficult             9/16/2024   General Health   How would you rate your overall physical health? Good   Feel stress (tense, anxious, or unable to sleep) To some extent      (!) STRESS CONCERN      9/16/2024   Nutrition   Three or more servings of calcium each day? Yes   Diet: Vegetarian/vegan   How many servings of fruit and vegetables per day? (!) 2-3   How many sweetened beverages each day? 0-1            9/16/2024   Exercise   Days per week of moderate/strenous  exercise 5 days            9/16/2024   Social Factors   Frequency of gathering with friends or relatives Once a week   Worry food won't last until get money to buy more No   Food not last or not have enough money for food? No   Do you have housing? (Housing is defined as stable permanent housing and does not include staying ouside in a car, in a tent, in an abandoned building, in an overnight shelter, or couch-surfing.) Yes   Are you worried about losing your housing? No   Lack of transportation? No   Unable to get utilities (heat,electricity)? No            9/16/2024   Dental   Dentist two times every year? Yes            9/16/2024   TB Screening   Were you born outside of the [place]? No            Today's PHQ-9 Score:       8/18/2024     9:40 AM   PHQ-9 SCORE   PHQ-9 Total Score MyChart 10 (Moderate depression)   PHQ-9 Total Score 10           9/16/2024   Substance Use   Alcohol more than 3/day or more than 7/wk No   Do you use any other substances recreationally? (!) CANNABIS PRODUCTS        Social History     Tobacco Use    Smoking status: Never     Passive exposure: Never    Smokeless tobacco: Never    Tobacco comments:     Lives in smoke free household   Vaping Use    Vaping status: Never Used   Substance Use Topics    Alcohol use: Yes     Comment: Occasionally, light use    Drug use: No          Mammogram Screening - Patient under 40 years of age: Routine Mammogram Screening not recommended.         9/16/2024   STI Screening   New sexual partner(s) since last STI/HIV test? No        History of abnormal Pap smear: No - age 21-29 PAP every 3 years recommended        8/19/2024     8:07 AM 1/19/2022     7:31 AM   PAP / HPV   PAP Negative for Intraepithelial Lesion or Malignancy (NILM)  Negative for Intraepithelial Lesion or Malignancy (NILM)            9/16/2024   Contraception/Family Planning   Questions about contraception or family planning No           Reviewed and updated as needed this visit by Provider          "           Past Medical History:   Diagnosis Date    Aortic valve disorder     Depressive disorder     H/O bacterial endocarditis     Myopia     Obesity      Past Surgical History:   Procedure Laterality Date    EYE SURGERY      HEART CATH CHILD  08/20/2012    Procedure: HEART CATH CHILD;  Right and Left Heart Cath;  Surgeon: Esvin Merlos MD;  Location: [facility] OR    RELEASE TRIGGER FINGER      REPAIR VALVE AORTIC  10/08/2012    Procedure: REPAIR VALVE AORTIC;  Release Supravalvular Aortic Stenosis, medan sternotomy, trans-esophageal echocardiaogram done by Dr. Wills;  Surgeon: Seng Lan MD;  Location: [facility] OR     Labs reviewed in EPIC      Review of Systems  Constitutional, neuro, ENT, endocrine, pulmonary, cardiac, gastrointestinal, genitourinary, musculoskeletal, integument and psychiatric systems are negative, except as otherwise noted.     Objective    Exam  /68 (BP Location: Right arm, Patient Position: Chair, Cuff Size: Adult Regular)   Pulse 77   Temp 98.3  F (36.8  C) (Tympanic)   Resp 16   Wt 107 kg (236 lb)   SpO2 100%   BMI 39.27 kg/m     Estimated body mass index is 39.27 kg/m  as calculated from the following:    Height as of 5/22/24: 1.651 m (5' 5\").    Weight as of this encounter: 107 kg (236 lb).    Physical Exam  GENERAL: alert and no distress  EYES: Eyes grossly normal to inspection, PERRL and conjunctivae and sclerae normal  HENT: ear canals and TM's normal, nose and mouth without ulcers or lesions  NECK: no adenopathy, no asymmetry, masses, or scars  RESP: lungs clear to auscultation - no rales, rhonchi or wheezes  BREAST: normal without masses, tenderness or nipple discharge and no palpable axillary masses or adenopathy  CV: regular rate and rhythm, normal S1 S2, no S3 or S4, no murmur, click or rub, no peripheral edema  ABDOMEN: soft, nontender, no hepatosplenomegaly, no masses and bowel sounds normal   (female): normal female external genitalia, normal urethral " meatus, normal vaginal mucosa, IUD string noted at cervical os.  MS: no gross musculoskeletal defects noted, no edema  SKIN: no suspicious lesions or rashes  NEURO: Normal strength and tone, mentation intact and speech normal  PSYCH: mentation appears normal, affect normal/bright        Signed Electronically by: Nereida Doe PA-C

## 2024-09-17 LAB
IRON BINDING CAPACITY (ROCHE): 222 UG/DL (ref 240–430)
IRON SATN MFR SERPL: 20 % (ref 15–46)
IRON SERPL-MCNC: 45 UG/DL (ref 37–145)
TSH SERPL DL<=0.005 MIU/L-ACNC: 1.61 UIU/ML (ref 0.3–4.2)

## 2024-09-17 RX ORDER — LEVOTHYROXINE SODIUM 25 UG/1
TABLET ORAL
Qty: 135 TABLET | Refills: 3 | Status: SHIPPED | OUTPATIENT
Start: 2024-09-17

## 2024-11-03 ENCOUNTER — MYC REFILL (OUTPATIENT)
Dept: FAMILY MEDICINE | Facility: CLINIC | Age: 29
End: 2024-11-03
Payer: COMMERCIAL

## 2024-11-03 DIAGNOSIS — E03.4 HYPOTHYROIDISM DUE TO ACQUIRED ATROPHY OF THYROID: ICD-10-CM

## 2024-11-04 RX ORDER — LEVOTHYROXINE SODIUM 25 UG/1
TABLET ORAL
Qty: 135 TABLET | Refills: 3 | OUTPATIENT
Start: 2024-11-04

## 2025-05-30 ASSESSMENT — ANXIETY QUESTIONNAIRES
1. FEELING NERVOUS, ANXIOUS, OR ON EDGE: MORE THAN HALF THE DAYS
8. IF YOU CHECKED OFF ANY PROBLEMS, HOW DIFFICULT HAVE THESE MADE IT FOR YOU TO DO YOUR WORK, TAKE CARE OF THINGS AT HOME, OR GET ALONG WITH OTHER PEOPLE?: SOMEWHAT DIFFICULT
7. FEELING AFRAID AS IF SOMETHING AWFUL MIGHT HAPPEN: MORE THAN HALF THE DAYS
3. WORRYING TOO MUCH ABOUT DIFFERENT THINGS: MORE THAN HALF THE DAYS
GAD7 TOTAL SCORE: 15
7. FEELING AFRAID AS IF SOMETHING AWFUL MIGHT HAPPEN: MORE THAN HALF THE DAYS
GAD7 TOTAL SCORE: 15
6. BECOMING EASILY ANNOYED OR IRRITABLE: NEARLY EVERY DAY
4. TROUBLE RELAXING: MORE THAN HALF THE DAYS
IF YOU CHECKED OFF ANY PROBLEMS ON THIS QUESTIONNAIRE, HOW DIFFICULT HAVE THESE PROBLEMS MADE IT FOR YOU TO DO YOUR WORK, TAKE CARE OF THINGS AT HOME, OR GET ALONG WITH OTHER PEOPLE: SOMEWHAT DIFFICULT
5. BEING SO RESTLESS THAT IT IS HARD TO SIT STILL: MORE THAN HALF THE DAYS
2. NOT BEING ABLE TO STOP OR CONTROL WORRYING: MORE THAN HALF THE DAYS
GAD7 TOTAL SCORE: 15

## 2025-06-02 ENCOUNTER — OFFICE VISIT (OUTPATIENT)
Dept: FAMILY MEDICINE | Facility: CLINIC | Age: 30
End: 2025-06-02
Payer: COMMERCIAL

## 2025-06-02 VITALS
DIASTOLIC BLOOD PRESSURE: 64 MMHG | RESPIRATION RATE: 20 BRPM | HEIGHT: 65 IN | TEMPERATURE: 99.1 F | OXYGEN SATURATION: 96 % | HEART RATE: 83 BPM | BODY MASS INDEX: 32.22 KG/M2 | WEIGHT: 193.4 LBS | SYSTOLIC BLOOD PRESSURE: 102 MMHG

## 2025-06-02 DIAGNOSIS — F33.1 MODERATE EPISODE OF RECURRENT MAJOR DEPRESSIVE DISORDER (H): ICD-10-CM

## 2025-06-02 DIAGNOSIS — F41.1 GAD (GENERALIZED ANXIETY DISORDER): Primary | ICD-10-CM

## 2025-06-02 LAB
HGB BLD-MCNC: 13.1 G/DL (ref 11.7–15.7)
MCV RBC AUTO: 92 FL (ref 78–100)

## 2025-06-02 PROCEDURE — 36415 COLL VENOUS BLD VENIPUNCTURE: CPT | Performed by: PHYSICIAN ASSISTANT

## 2025-06-02 PROCEDURE — 3078F DIAST BP <80 MM HG: CPT | Performed by: PHYSICIAN ASSISTANT

## 2025-06-02 PROCEDURE — 3074F SYST BP LT 130 MM HG: CPT | Performed by: PHYSICIAN ASSISTANT

## 2025-06-02 PROCEDURE — 84443 ASSAY THYROID STIM HORMONE: CPT | Performed by: PHYSICIAN ASSISTANT

## 2025-06-02 PROCEDURE — 99213 OFFICE O/P EST LOW 20 MIN: CPT | Performed by: PHYSICIAN ASSISTANT

## 2025-06-02 PROCEDURE — 85018 HEMOGLOBIN: CPT | Performed by: PHYSICIAN ASSISTANT

## 2025-06-02 RX ORDER — FLUOXETINE HYDROCHLORIDE 40 MG/1
40 CAPSULE ORAL DAILY
Qty: 90 CAPSULE | Refills: 0 | Status: SHIPPED | OUTPATIENT
Start: 2025-06-02

## 2025-06-02 ASSESSMENT — PATIENT HEALTH QUESTIONNAIRE - PHQ9
10. IF YOU CHECKED OFF ANY PROBLEMS, HOW DIFFICULT HAVE THESE PROBLEMS MADE IT FOR YOU TO DO YOUR WORK, TAKE CARE OF THINGS AT HOME, OR GET ALONG WITH OTHER PEOPLE: SOMEWHAT DIFFICULT
SUM OF ALL RESPONSES TO PHQ QUESTIONS 1-9: 19
SUM OF ALL RESPONSES TO PHQ QUESTIONS 1-9: 19

## 2025-06-02 NOTE — PROGRESS NOTES
"    Pamella Morgan is a 29 year old, presenting for the following health issues:  Mental Health Problem (recheck)        6/2/2025     3:08 PM   Additional Questions   Roomed by Yessenia Tolentino CMA   Accompanied by None         6/2/2025     3:08 PM   Patient Reported Additional Medications   Patient reports taking the following new medications none     Mental Health Problem    History of Present Illness       Mental Health Follow-up:  Patient presents to follow-up on Depression & Anxiety.Patient's depression since last visit has been:  Worse  The patient is not having other symptoms associated with depression.  Patient's anxiety since last visit has been:  Worse  The patient is not having other symptoms associated with anxiety.  Any significant life events: No  Patient is feeling anxious or having panic attacks.  Patient has no concerns about alcohol or drug use.    She eats 2-3 servings of fruits and vegetables daily.She consumes 1 sweetened beverage(s) daily.She exercises with enough effort to increase her heart rate 30 to 60 minutes per day.  She exercises with enough effort to increase her heart rate 5 days per week.   She is taking medications regularly.        Noting irritability. Depression is stable. Occasional anxiety attacks. No anhedonia. Sleeping fine. Not suicidal.     Review of Systems  Constitutional, HEENT, cardiovascular, pulmonary, GI, , musculoskeletal, neuro, skin, endocrine and psych systems are negative, except as otherwise noted.      Objective    /64   Pulse 83   Temp 99.1  F (37.3  C) (Oral)   Resp 20   Ht 1.643 m (5' 4.67\")   Wt 87.7 kg (193 lb 6.4 oz)   LMP  (LMP Unknown)   SpO2 96%   BMI 32.52 kg/m    Body mass index is 32.52 kg/m .  Physical Exam   Eye exam - right eye normal lid, conjunctiva, cornea, pupil and fundus, left eye normal lid, conjunctiva, cornea, pupil and fundus.  Thyroid not palpable, not enlarged, no nodules detected.  CHEST:chest clear to IPPA, no " tachypnea, retractions or cyanosis, and S1, S2 normal, no murmur, no gallop, rate regular.    Cathy was seen today for mental health problem.    Diagnoses and all orders for this visit:    THANG (generalized anxiety disorder)  -     TSH with free T4 reflex; Future  -     FLUoxetine (PROZAC) 40 MG capsule; Take 1 capsule (40 mg) by mouth daily.    Moderate episode of recurrent major depressive disorder (H)  -     TSH with free T4 reflex; Future  -     Hemoglobin; Future  -     FLUoxetine (PROZAC) 40 MG capsule; Take 1 capsule (40 mg) by mouth daily.    Other orders  -     REVIEW OF HEALTH MAINTENANCE PROTOCOL ORDERS      Inc fluoxetine to 40 mg daily.  Recheck in 6-8 wks.    Signed Electronically by: Vijay Hammonds PA-C

## 2025-06-03 ENCOUNTER — RESULTS FOLLOW-UP (OUTPATIENT)
Dept: FAMILY MEDICINE | Facility: CLINIC | Age: 30
End: 2025-06-03

## 2025-06-03 LAB — TSH SERPL DL<=0.005 MIU/L-ACNC: 1.21 UIU/ML (ref 0.3–4.2)

## 2025-07-30 ENCOUNTER — TELEPHONE (OUTPATIENT)
Dept: PEDIATRIC CARDIOLOGY | Facility: CLINIC | Age: 30
End: 2025-07-30
Payer: COMMERCIAL

## 2025-07-30 NOTE — TELEPHONE ENCOUNTER
Reviewed chart, scheduled appropriately. Can transition to adults after Dr. Merlos visit.    Yovana Cunningham RN, BSN, CPN  Care Coordinator Pediatric Cardiology and Endocrinology  [facility]  Phone: [phone]  Fax: [phone]

## 2025-07-30 NOTE — TELEPHONE ENCOUNTER
Health Call Center    Phone Message    May a detailed message be left on voicemail: yes     Reason for Call: Other: 31 yo patient calling to schedule with Dr Merlos. 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 Scheduled 09/11/25 per pt. Please review if this appropriate or needs to be transitioned to adult? Many thanks.     Action Taken: Message routed to:  Other: [facility] PED[facility] CARDIOLOG[facility] [place]    Travel Screening: Not Applicable     Date of Service:

## 2025-08-13 DIAGNOSIS — Q25.3 SUPRAVALVAR AORTIC STENOSIS: Primary | ICD-10-CM

## 2025-08-18 ENCOUNTER — PATIENT OUTREACH (OUTPATIENT)
Dept: CARE COORDINATION | Facility: CLINIC | Age: 30
End: 2025-08-18
Payer: COMMERCIAL